# Patient Record
Sex: FEMALE | Race: WHITE | Employment: FULL TIME | URBAN - METROPOLITAN AREA
[De-identification: names, ages, dates, MRNs, and addresses within clinical notes are randomized per-mention and may not be internally consistent; named-entity substitution may affect disease eponyms.]

---

## 2017-03-19 ENCOUNTER — APPOINTMENT (OUTPATIENT)
Dept: CT IMAGING | Age: 52
End: 2017-03-19
Attending: NURSE PRACTITIONER
Payer: COMMERCIAL

## 2017-03-19 ENCOUNTER — HOSPITAL ENCOUNTER (EMERGENCY)
Age: 52
Discharge: HOME | End: 2017-03-19
Attending: EMERGENCY MEDICINE
Payer: COMMERCIAL

## 2017-03-19 ENCOUNTER — APPOINTMENT (OUTPATIENT)
Dept: GENERAL RADIOLOGY | Age: 52
End: 2017-03-19
Attending: PHYSICIAN ASSISTANT
Payer: COMMERCIAL

## 2017-03-19 VITALS
BODY MASS INDEX: 29.73 KG/M2 | RESPIRATION RATE: 18 BRPM | WEIGHT: 185 LBS | HEIGHT: 66 IN | SYSTOLIC BLOOD PRESSURE: 140 MMHG | DIASTOLIC BLOOD PRESSURE: 89 MMHG | TEMPERATURE: 99 F | HEART RATE: 83 BPM | OXYGEN SATURATION: 97 %

## 2017-03-19 DIAGNOSIS — E87.6 HYPOKALEMIA: ICD-10-CM

## 2017-03-19 DIAGNOSIS — B34.9 VIRAL SYNDROME: Primary | ICD-10-CM

## 2017-03-19 LAB
ANION GAP BLD CALC-SCNC: 7 MMOL/L (ref 3–18)
BASOPHILS # BLD AUTO: 0 K/UL (ref 0–0.1)
BASOPHILS # BLD: 0 % (ref 0–2)
BUN SERPL-MCNC: 7 MG/DL (ref 7–18)
BUN/CREAT SERPL: 8 (ref 12–20)
CALCIUM SERPL-MCNC: 8.3 MG/DL (ref 8.5–10.1)
CHLORIDE SERPL-SCNC: 100 MMOL/L (ref 100–108)
CK MB CFR SERPL CALC: 0.8 % (ref 0–4)
CK MB SERPL-MCNC: 1 NG/ML (ref 5–25)
CK SERPL-CCNC: 129 U/L (ref 26–192)
CO2 SERPL-SCNC: 32 MMOL/L (ref 21–32)
CREAT SERPL-MCNC: 0.84 MG/DL (ref 0.6–1.3)
D DIMER PPP FEU-MCNC: 0.6 UG/ML(FEU)
DIFFERENTIAL METHOD BLD: ABNORMAL
EOSINOPHIL # BLD: 0 K/UL (ref 0–0.4)
EOSINOPHIL NFR BLD: 0 % (ref 0–5)
ERYTHROCYTE [DISTWIDTH] IN BLOOD BY AUTOMATED COUNT: 14.8 % (ref 11.6–14.5)
FLUAV AG NPH QL IA: NEGATIVE
FLUBV AG NOSE QL IA: NEGATIVE
GLUCOSE SERPL-MCNC: 97 MG/DL (ref 74–99)
HCT VFR BLD AUTO: 37.2 % (ref 35–45)
HGB BLD-MCNC: 11.9 G/DL (ref 12–16)
LYMPHOCYTES # BLD AUTO: 46 % (ref 21–52)
LYMPHOCYTES # BLD: 1.5 K/UL (ref 0.9–3.6)
MCH RBC QN AUTO: 28 PG (ref 24–34)
MCHC RBC AUTO-ENTMCNC: 32 G/DL (ref 31–37)
MCV RBC AUTO: 87.5 FL (ref 74–97)
MONOCYTES # BLD: 0.5 K/UL (ref 0.05–1.2)
MONOCYTES NFR BLD AUTO: 15 % (ref 3–10)
NEUTS SEG # BLD: 1.3 K/UL (ref 1.8–8)
NEUTS SEG NFR BLD AUTO: 39 % (ref 40–73)
PLATELET # BLD AUTO: 223 K/UL (ref 135–420)
PMV BLD AUTO: 11.1 FL (ref 9.2–11.8)
POTASSIUM SERPL-SCNC: 3.1 MMOL/L (ref 3.5–5.5)
RBC # BLD AUTO: 4.25 M/UL (ref 4.2–5.3)
SODIUM SERPL-SCNC: 139 MMOL/L (ref 136–145)
TROPONIN I BLD-MCNC: <0.04 NG/ML (ref 0–0.08)
TROPONIN I SERPL-MCNC: <0.02 NG/ML (ref 0–0.04)
WBC # BLD AUTO: 3.3 K/UL (ref 4.6–13.2)

## 2017-03-19 PROCEDURE — 99284 EMERGENCY DEPT VISIT MOD MDM: CPT

## 2017-03-19 PROCEDURE — 87804 INFLUENZA ASSAY W/OPTIC: CPT | Performed by: NURSE PRACTITIONER

## 2017-03-19 PROCEDURE — 87081 CULTURE SCREEN ONLY: CPT | Performed by: NURSE PRACTITIONER

## 2017-03-19 PROCEDURE — 85379 FIBRIN DEGRADATION QUANT: CPT | Performed by: PHYSICIAN ASSISTANT

## 2017-03-19 PROCEDURE — 74011250637 HC RX REV CODE- 250/637: Performed by: NURSE PRACTITIONER

## 2017-03-19 PROCEDURE — 74011636320 HC RX REV CODE- 636/320: Performed by: EMERGENCY MEDICINE

## 2017-03-19 PROCEDURE — 71275 CT ANGIOGRAPHY CHEST: CPT

## 2017-03-19 PROCEDURE — 85025 COMPLETE CBC W/AUTO DIFF WBC: CPT | Performed by: PHYSICIAN ASSISTANT

## 2017-03-19 PROCEDURE — 80048 BASIC METABOLIC PNL TOTAL CA: CPT | Performed by: PHYSICIAN ASSISTANT

## 2017-03-19 PROCEDURE — 93005 ELECTROCARDIOGRAM TRACING: CPT

## 2017-03-19 PROCEDURE — 82550 ASSAY OF CK (CPK): CPT | Performed by: PHYSICIAN ASSISTANT

## 2017-03-19 PROCEDURE — 71020 XR CHEST PA LAT: CPT

## 2017-03-19 PROCEDURE — 84484 ASSAY OF TROPONIN QUANT: CPT | Performed by: PHYSICIAN ASSISTANT

## 2017-03-19 RX ORDER — BENZONATATE 100 MG/1
100 CAPSULE ORAL
Qty: 30 CAP | Refills: 0 | Status: SHIPPED | OUTPATIENT
Start: 2017-03-19 | End: 2017-03-26

## 2017-03-19 RX ORDER — POTASSIUM CHLORIDE 20 MEQ/1
40 TABLET, EXTENDED RELEASE ORAL
Status: COMPLETED | OUTPATIENT
Start: 2017-03-19 | End: 2017-03-19

## 2017-03-19 RX ORDER — IBUPROFEN 600 MG/1
600 TABLET ORAL
Qty: 15 TAB | Refills: 0 | Status: SHIPPED | OUTPATIENT
Start: 2017-03-19 | End: 2017-07-25

## 2017-03-19 RX ORDER — HYDROCODONE BITARTRATE AND ACETAMINOPHEN 7.5; 325 MG/1; MG/1
1 TABLET ORAL
Status: COMPLETED | OUTPATIENT
Start: 2017-03-19 | End: 2017-03-19

## 2017-03-19 RX ORDER — LANOLIN ALCOHOL/MO/W.PET/CERES
400 CREAM (GRAM) TOPICAL
Status: COMPLETED | OUTPATIENT
Start: 2017-03-19 | End: 2017-03-19

## 2017-03-19 RX ADMIN — Medication 400 MG: at 22:59

## 2017-03-19 RX ADMIN — POTASSIUM CHLORIDE 40 MEQ: 1500 TABLET, EXTENDED RELEASE ORAL at 22:59

## 2017-03-19 RX ADMIN — IOPAMIDOL 50 ML: 755 INJECTION, SOLUTION INTRAVENOUS at 21:00

## 2017-03-19 RX ADMIN — HYDROCODONE BITARTRATE AND ACETAMINOPHEN 1 TABLET: 7.5; 325 TABLET ORAL at 23:33

## 2017-03-19 NOTE — ED TRIAGE NOTES
Pt, c/o cough, nasal& chest congestion, fever , headache , body aches  Started Thursday was seen at [facility] ,  Started on Augmenttin,    Not getting any bettter, pain in chest  With coughing & breathing

## 2017-03-19 NOTE — ED NOTES
I performed a brief evaluation, including history and physical, of the patient here in triage and I have determined that pt will need further treatment and evaluation from the main side ER physician. I have placed initial orders to help in expediting patients care.      March 19, 2017 at 6:47 PM - Floresita Lechuga PA-C        Visit Vitals    /88 (BP 1 Location: Left arm, BP Patient Position: Sitting)    Pulse 87    Temp 99.8 °F (37.7 °C)    Resp 18    Ht 5' 6\" (1.676 m)    Wt 83.9 kg (185 lb)    SpO2 96%    BMI 29.86 kg/m2

## 2017-03-19 NOTE — LETTER
NOTIFICATION RETURN TO WORK / SCHOOL 
 
3/19/2017 11:09 PM 
 
Ms. Bae [address] [place] [id number] To Whom It May Concern: 
 
Steven Fair is currently under the care of [facility] EMERGENCY DEPT. She will return to work/school on: 3/22/17 If there are questions or concerns please have the patient contact our office. Sincerely, Nevaeh Richardson NP

## 2017-03-20 LAB
ATRIAL RATE: 81 BPM
CALCULATED P AXIS, ECG09: 47 DEGREES
CALCULATED R AXIS, ECG10: 41 DEGREES
CALCULATED T AXIS, ECG11: 59 DEGREES
DIAGNOSIS, 93000: NORMAL
P-R INTERVAL, ECG05: 148 MS
Q-T INTERVAL, ECG07: 394 MS
QRS DURATION, ECG06: 74 MS
QTC CALCULATION (BEZET), ECG08: 457 MS
VENTRICULAR RATE, ECG03: 81 BPM

## 2017-03-20 NOTE — DISCHARGE INSTRUCTIONS
Viral Infections: Care Instructions  Your Care Instructions  You don't feel well, but it's not clear what's causing it. You may have a viral infection. Viruses cause many illnesses, such as the common cold, influenza, fever, rashes, and the diarrhea, nausea, and vomiting that are often called \"stomach flu. \" You may wonder if antibiotic medicines could make you feel better. But antibiotics only treat infections caused by bacteria. They don't work on viruses. The good news is that viral infections usually aren't serious. Most will go away in a few days without medical treatment. In the meantime, there are a few things you can do to make yourself more comfortable. Follow-up care is a key part of your treatment and safety. Be sure to make and go to all appointments, and call your doctor if you are having problems. It's also a good idea to know your test results and keep a list of the medicines you take. How can you care for yourself at home? · Get plenty of rest if you feel tired. · Take an over-the-counter pain medicine if needed, such as acetaminophen (Tylenol), ibuprofen (Advil, Motrin), or naproxen (Aleve). Read and follow all instructions on the label. · Be careful when taking over-the-counter cold or flu medicines and Tylenol at the same time. Many of these medicines have acetaminophen, which is Tylenol. Read the labels to make sure that you are not taking more than the recommended dose. Too much acetaminophen (Tylenol) can be harmful. · Drink plenty of fluids, enough so that your urine is light yellow or clear like water. If you have kidney, heart, or liver disease and have to limit fluids, talk with your doctor before you increase the amount of fluids you drink. · Stay home from work, school, and other public places while you have a fever. When should you call for help? Call 911 anytime you think you may need emergency care. For example, call if:  · You have severe trouble breathing.   · You passed out (lost consciousness). Call your doctor now or seek immediate medical care if:  · You seem to be getting much sicker. · You have a new or higher fever. · You have blood in your stools. · You have new belly pain, or your pain gets worse. · You have a new rash. Watch closely for changes in your health, and be sure to contact your doctor if:  · You start to get better and then get worse. · You do not get better as expected. Where can you learn more? Go to http://arden-emiliano.info/. Enter R317 in the search box to learn more about \"Viral Infections: Care Instructions. \"  Current as of: May 24, 2016  Content Version: 11.1  © 5702-7963 [employer]. Care instructions adapted under license by [employer] (which disclaims liability or warranty for this information). If you have questions about a medical condition or this instruction, always ask your healthcare professional. [place] [address] any warranty or liability for your use of this information. Hypokalemia: Care Instructions  Your Care Instructions  Hypokalemia (say \"fe-ae-ash-DENEEN-claritza-uh\") is a low level of potassium. The heart, muscles, kidneys, and nervous system all need potassium to work well. This problem has many different causes. Kidney problems, diet, and medicines like diuretics and laxatives can cause it. So can vomiting or diarrhea. In some cases, cancer is the cause. Your doctor may do tests to find the cause of your low potassium levels. You may need medicines to bring your potassium levels back to normal. You may also need regular blood tests to check your potassium. If you have very low potassium, you may need intravenous (IV) medicines. You also may need tests to check the electrical activity of your heart. Heart problems caused by low potassium levels can be very serious. Follow-up care is a key part of your treatment and safety.  Be sure to make and go to all appointments, and call your doctor if you are having problems. It's also a good idea to know your test results and keep a list of the medicines you take. How can you care for yourself at home? · If your doctor recommends it, eat foods that have a lot of potassium. These include fresh fruits, juices, and vegetables. They also include nuts, beans, and milk. · Be safe with medicines. If your doctor prescribes medicines or potassium supplements, take them exactly as directed. Call your doctor if you have any problems with your medicines. · Get your potassium levels tested as often as your doctor tells you. When should you call for help? Call 911 anytime you think you may need emergency care. For example, call if:  · You feel like your heart is missing beats. Heart problems caused by low potassium can cause death. · You passed out (lost consciousness). · You have a seizure. Call your doctor now or seek immediate medical care if:  · You feel weak or unusually tired. · You have severe arm or leg cramps. · You have tingling or numbness. · You feel sick to your stomach, or you vomit. · You have belly cramps. · You feel bloated or constipated. · You have to urinate a lot. · You feel very thirsty most of the time. · You are dizzy or lightheaded, or you feel like you may faint. · You feel depressed, or you lose touch with reality. Watch closely for changes in your health, and be sure to contact your doctor if:  · You do not get better as expected. Where can you learn more? Go to http://arden-emiliano.info/. Enter G358 in the search box to learn more about \"Hypokalemia: Care Instructions. \"  Current as of: July 28, 2016  Content Version: 11.1  © 0921-6335 [employer]. Care instructions adapted under license by [employer] (which disclaims liability or warranty for this information).  If you have questions about a medical condition or this instruction, always ask your healthcare professional. [address] any warranty or liability for your use of this information. MyChart Activation    Thank you for requesting access to [facility]. Please follow the instructions below to securely access and download your online medical record. [facility] allows you to send messages to your doctor, view your test results, renew your prescriptions, schedule appointments, and more. How Do I Sign Up? 1. In your internet browser, go to [email or web address]  2. Click on the First Time User? Click Here link in the Sign In box. You will be redirect to the New Member Sign Up page. 3. Enter your [facility] Access Code exactly as it appears below. You will not need to use this code after youve completed the sign-up process. If you do not sign up before the expiration date, you must request a new code. [facility] Access Code: [email or web address]  Expires: 2017  8:58 PM (This is the date your [facility]rio access code will )    4. Enter the last four digits of your Social Security Number (xxxx) and Date of Birth (mm/dd/yyyy) as indicated and click Submit. You will be taken to the next sign-up page. 5. Create a Korrio ID. This will be your [facility]rio login ID and cannot be changed, so think of one that is secure and easy to remember. 6. Create a [facility]rio password. You can change your password at any time. 7. Enter your Password Reset Question and Answer. This can be used at a later time if you forget your password. 8. Enter your e-mail address. You will receive e-mail notification when new information is available in [address]. 9. Click Sign Up. You can now view and download portions of your medical record. 10. Click the Download Summary menu link to download a portable copy of your medical information. Additional Information    If you have questions, please visit the Frequently Asked Questions section of the [facility] website at [email or web address] Remember, MyChart is NOT to be used for urgent needs. For medical emergencies, dial 911.

## 2017-03-21 LAB
B-HEM STREP THROAT QL CULT: NEGATIVE
BACTERIA SPEC CULT: NORMAL
SERVICE CMNT-IMP: NORMAL

## 2017-04-17 RX ORDER — NIFEDIPINE 60 MG/1
TABLET, EXTENDED RELEASE ORAL
Qty: 30 TAB | Refills: 2 | Status: SHIPPED | OUTPATIENT
Start: 2017-04-17 | End: 2017-07-21 | Stop reason: SDUPTHER

## 2017-05-03 DIAGNOSIS — F41.9 ANXIETY: ICD-10-CM

## 2017-05-04 RX ORDER — SERTRALINE HYDROCHLORIDE 25 MG/1
TABLET, FILM COATED ORAL
Qty: 30 TAB | Refills: 3 | Status: SHIPPED | OUTPATIENT
Start: 2017-05-04 | End: 2017-07-21 | Stop reason: SDUPTHER

## 2017-05-12 DIAGNOSIS — Z87.39 H/O ARTHRITIS: ICD-10-CM

## 2017-05-13 RX ORDER — INDOMETHACIN 50 MG/1
CAPSULE ORAL
Qty: 30 CAP | Refills: 1
Start: 2017-05-13

## 2017-07-21 DIAGNOSIS — F41.9 ANXIETY: ICD-10-CM

## 2017-07-21 RX ORDER — SERTRALINE HYDROCHLORIDE 25 MG/1
25 TABLET, FILM COATED ORAL DAILY
Qty: 90 TAB | Refills: 0 | Status: SHIPPED | OUTPATIENT
Start: 2017-07-21 | End: 2017-07-25 | Stop reason: SDUPTHER

## 2017-07-21 RX ORDER — NIFEDIPINE 60 MG/1
60 TABLET, EXTENDED RELEASE ORAL DAILY
Qty: 90 TAB | Refills: 0 | Status: SHIPPED | OUTPATIENT
Start: 2017-07-21 | End: 2017-07-25 | Stop reason: SDUPTHER

## 2017-07-21 NOTE — TELEPHONE ENCOUNTER
This patient contacted office for the following prescriptions to be filled:    Medication requested :   Requested Prescriptions     Pending Prescriptions Disp Refills    sertraline (ZOLOFT) 25 mg tablet 30 Tab 3    NIFEdipine ER (PROCARDIA XL) 60 mg ER tablet 30 Tab 2     PCP: Jude Holm or Print: [facility] stephanie or Local pharmacy [address]     Scheduled appointment if not seen by current providers in office: LOV 11/30/2016 f/u 7/25/2017

## 2017-07-25 ENCOUNTER — OFFICE VISIT (OUTPATIENT)
Dept: FAMILY MEDICINE CLINIC | Age: 52
End: 2017-07-25

## 2017-07-25 VITALS
BODY MASS INDEX: 29.8 KG/M2 | HEART RATE: 81 BPM | HEIGHT: 66 IN | SYSTOLIC BLOOD PRESSURE: 148 MMHG | OXYGEN SATURATION: 97 % | WEIGHT: 185.4 LBS | DIASTOLIC BLOOD PRESSURE: 108 MMHG | TEMPERATURE: 98.3 F | RESPIRATION RATE: 16 BRPM

## 2017-07-25 DIAGNOSIS — K21.9 GASTROESOPHAGEAL REFLUX DISEASE WITHOUT ESOPHAGITIS: ICD-10-CM

## 2017-07-25 DIAGNOSIS — Z12.11 SCREENING FOR COLON CANCER: ICD-10-CM

## 2017-07-25 DIAGNOSIS — K58.9 IRRITABLE BOWEL SYNDROME, UNSPECIFIED TYPE: ICD-10-CM

## 2017-07-25 DIAGNOSIS — G89.29 OTHER CHRONIC PAIN: ICD-10-CM

## 2017-07-25 DIAGNOSIS — E78.5 DYSLIPIDEMIA: ICD-10-CM

## 2017-07-25 DIAGNOSIS — F41.9 ANXIETY: ICD-10-CM

## 2017-07-25 DIAGNOSIS — I10 ESSENTIAL HYPERTENSION: Primary | ICD-10-CM

## 2017-07-25 DIAGNOSIS — R73.01 IMPAIRED FASTING BLOOD SUGAR: ICD-10-CM

## 2017-07-25 RX ORDER — INDOMETHACIN 50 MG/1
50 CAPSULE ORAL
Qty: 60 CAP | Refills: 0 | Status: SHIPPED | OUTPATIENT
Start: 2017-07-25 | End: 2018-02-12 | Stop reason: SDUPTHER

## 2017-07-25 RX ORDER — DICYCLOMINE HYDROCHLORIDE 20 MG/1
20 TABLET ORAL EVERY 6 HOURS
Qty: 30 TAB | Refills: 0 | Status: SHIPPED | OUTPATIENT
Start: 2017-07-25 | End: 2018-02-12 | Stop reason: SDUPTHER

## 2017-07-25 RX ORDER — PHENOL/SODIUM PHENOLATE
20 AEROSOL, SPRAY (ML) MUCOUS MEMBRANE DAILY
Qty: 90 TAB | Refills: 1 | Status: SHIPPED | OUTPATIENT
Start: 2017-07-25 | End: 2018-02-12 | Stop reason: SDUPTHER

## 2017-07-25 RX ORDER — ALPRAZOLAM 0.25 MG/1
0.25 TABLET ORAL
Qty: 10 TAB | Refills: 0 | Status: CANCELLED | OUTPATIENT
Start: 2017-07-25

## 2017-07-25 RX ORDER — NIFEDIPINE 60 MG/1
60 TABLET, EXTENDED RELEASE ORAL DAILY
Qty: 90 TAB | Refills: 0 | Status: SHIPPED | OUTPATIENT
Start: 2017-07-25 | End: 2018-01-18 | Stop reason: SDUPTHER

## 2017-07-25 RX ORDER — INDOMETHACIN 50 MG/1
50 CAPSULE ORAL AS NEEDED
Refills: 0 | COMMUNITY
Start: 2017-05-26 | End: 2017-07-25 | Stop reason: SDUPTHER

## 2017-07-25 RX ORDER — SERTRALINE HYDROCHLORIDE 25 MG/1
25 TABLET, FILM COATED ORAL DAILY
Qty: 90 TAB | Refills: 0 | Status: SHIPPED | OUTPATIENT
Start: 2017-07-25 | End: 2018-01-29 | Stop reason: SDUPTHER

## 2017-07-25 RX ORDER — LISINOPRIL 20 MG/1
20 TABLET ORAL DAILY
Qty: 30 TAB | Refills: 1 | Status: SHIPPED | OUTPATIENT
Start: 2017-07-25 | End: 2018-04-13 | Stop reason: SDUPTHER

## 2017-07-25 NOTE — PATIENT INSTRUCTIONS
DASH Diet: Care Instructions  Your Care Instructions  The DASH diet is an eating plan that can help lower your blood pressure. DASH stands for Dietary Approaches to Stop Hypertension. Hypertension is high blood pressure. The DASH diet focuses on eating foods that are high in calcium, potassium, and magnesium. These nutrients can lower blood pressure. The foods that are highest in these nutrients are fruits, vegetables, low-fat dairy products, nuts, seeds, and legumes. But taking calcium, potassium, and magnesium supplements instead of eating foods that are high in those nutrients does not have the same effect. The DASH diet also includes whole grains, fish, and poultry. The DASH diet is one of several lifestyle changes your doctor may recommend to lower your high blood pressure. Your doctor may also want you to decrease the amount of sodium in your diet. Lowering sodium while following the DASH diet can lower blood pressure even further than just the DASH diet alone. Follow-up care is a key part of your treatment and safety. Be sure to make and go to all appointments, and call your doctor if you are having problems. It's also a good idea to know your test results and keep a list of the medicines you take. How can you care for yourself at home? Following the DASH diet  · Eat 4 to 5 servings of fruit each day. A serving is 1 medium-sized piece of fruit, ½ cup chopped or canned fruit, 1/4 cup dried fruit, or 4 ounces (½ cup) of fruit juice. Choose fruit more often than fruit juice. · Eat 4 to 5 servings of vegetables each day. A serving is 1 cup of lettuce or raw leafy vegetables, ½ cup of chopped or cooked vegetables, or 4 ounces (½ cup) of vegetable juice. Choose vegetables more often than vegetable juice. · Get 2 to 3 servings of low-fat and fat-free dairy each day. A serving is 8 ounces of milk, 1 cup of yogurt, or 1 ½ ounces of cheese. · Eat 6 to 8 servings of grains each day.  A serving is 1 slice of bread, 1 ounce of dry cereal, or ½ cup of cooked rice, pasta, or cooked cereal. Try to choose whole-grain products as much as possible. · Limit lean meat, poultry, and fish to 2 servings each day. A serving is 3 ounces, about the size of a deck of cards. · Eat 4 to 5 servings of nuts, seeds, and legumes (cooked dried beans, lentils, and split peas) each week. A serving is 1/3 cup of nuts, 2 tablespoons of seeds, or ½ cup of cooked beans or peas. · Limit fats and oils to 2 to 3 servings each day. A serving is 1 teaspoon of vegetable oil or 2 tablespoons of salad dressing. · Limit sweets and added sugars to 5 servings or less a week. A serving is 1 tablespoon jelly or jam, ½ cup sorbet, or 1 cup of lemonade. · Eat less than 2,300 milligrams (mg) of sodium a day. If you limit your sodium to 1,500 mg a day, you can lower your blood pressure even more. Tips for success  · Start small. Do not try to make dramatic changes to your diet all at once. You might feel that you are missing out on your favorite foods and then be more likely to not follow the plan. Make small changes, and stick with them. Once those changes become habit, add a few more changes. · Try some of the following:  ¨ Make it a goal to eat a fruit or vegetable at every meal and at snacks. This will make it easy to get the recommended amount of fruits and vegetables each day. ¨ Try yogurt topped with fruit and nuts for a snack or healthy dessert. ¨ Add lettuce, tomato, cucumber, and onion to sandwiches. ¨ Combine a ready-made pizza crust with low-fat mozzarella cheese and lots of vegetable toppings. Try using tomatoes, squash, spinach, broccoli, carrots, cauliflower, and onions. ¨ Have a variety of cut-up vegetables with a low-fat dip as an appetizer instead of chips and dip. ¨ Sprinkle sunflower seeds or chopped almonds over salads. Or try adding chopped walnuts or almonds to cooked vegetables. ¨ Try some vegetarian meals using beans and peas. Add garbanzo or kidney beans to salads. Make burritos and tacos with mashed thorne beans or black beans. Where can you learn more? Go to http://arden-emiliano.info/. Enter P658 in the search box to learn more about \"DASH Diet: Care Instructions. \"  Current as of: April 3, 2017  Content Version: 11.3  © 3694-9883 [employer]. Care instructions adapted under license by [employer] (which disclaims liability or warranty for this information). If you have questions about a medical condition or this instruction, always ask your healthcare professional. [place] [address] any warranty or liability for your use of this information. Anxiety Disorder: Care Instructions  Your Care Instructions  Anxiety is a normal reaction to stress. Difficult situations can cause you to have symptoms such as sweaty palms and a nervous feeling. In an anxiety disorder, the symptoms are far more severe. Constant worry, muscle tension, trouble sleeping, nausea and diarrhea, and other symptoms can make normal daily activities difficult or impossible. These symptoms may occur for no reason, and they can affect your work, school, or social life. Medicines, counseling, and self-care can all help. Follow-up care is a key part of your treatment and safety. Be sure to make and go to all appointments, and call your doctor if you are having problems. It's also a good idea to know your test results and keep a list of the medicines you take. How can you care for yourself at home? · Take medicines exactly as directed. Call your doctor if you think you are having a problem with your medicine. · Go to your counseling sessions and follow-up appointments. · Recognize and accept your anxiety. Then, when you are in a situation that makes you anxious, say to yourself, \"This is not an emergency. I feel uncomfortable, but I am not in danger. I can keep going even if I feel anxious. \"  · Be kind to your body:  ¨ Relieve tension with exercise or a massage. ¨ Get enough rest.  ¨ Avoid alcohol, caffeine, nicotine, and illegal drugs. They can increase your anxiety level and cause sleep problems. ¨ Learn and do relaxation techniques. See below for more about these techniques. · Engage your mind. Get out and do something you enjoy. Go to a funny movie, or take a walk or hike. Plan your day. Having too much or too little to do can make you anxious. · Keep a record of your symptoms. Discuss your fears with a good friend or family member, or join a support group for people with similar problems. Talking to others sometimes relieves stress. · Get involved in social groups, or volunteer to help others. Being alone sometimes makes things seem worse than they are. · Get at least 30 minutes of exercise on most days of the week to relieve stress. Walking is a good choice. You also may want to do other activities, such as running, swimming, cycling, or playing tennis or team sports. Relaxation techniques  Do relaxation exercises 10 to 20 minutes a day. You can play soothing, relaxing music while you do them, if you wish. · Tell others in your house that you are going to do your relaxation exercises. Ask them not to disturb you. · Find a comfortable place, away from all distractions and noise. · Lie down on your back, or sit with your back straight. · Focus on your breathing. Make it slow and steady. · Breathe in through your nose. Breathe out through either your nose or mouth. · Breathe deeply, filling up the area between your navel and your rib cage. Breathe so that your belly goes up and down. · Do not hold your breath. · Breathe like this for 5 to 10 minutes. Notice the feeling of calmness throughout your whole body. As you continue to breathe slowly and deeply, relax by doing the following for another 5 to 10 minutes:  · Tighten and relax each muscle group in your body.  You can begin at your toes and work your way up to your head. · Imagine your muscle groups relaxing and becoming heavy. · Empty your mind of all thoughts. · Let yourself relax more and more deeply. · Become aware of the state of calmness that surrounds you. · When your relaxation time is over, you can bring yourself back to alertness by moving your fingers and toes and then your hands and feet and then stretching and moving your entire body. Sometimes people fall asleep during relaxation, but they usually wake up shortly afterward. · Always give yourself time to return to full alertness before you drive a car or do anything that might cause an accident if you are not fully alert. Never play a relaxation tape while you drive a car. When should you call for help? Call 911 anytime you think you may need emergency care. For example, call if:  · You feel you cannot stop from hurting yourself or someone else. Keep the numbers for these national suicide hotlines: 0-673-843-TALK (3-084-243-137.603.1665) and 4-436-TZYJQBG (9-334.320.9498). If you or someone you know talks about suicide or feeling hopeless, get help right away. Watch closely for changes in your health, and be sure to contact your doctor if:  · You have anxiety or fear that affects your life. · You have symptoms of anxiety that are new or different from those you had before. Where can you learn more? Go to http://arden-emiliano.info/. Enter P754 in the search box to learn more about \"Anxiety Disorder: Care Instructions. \"  Current as of: July 26, 2016  Content Version: 11.3  © 0417-5736 [employer]. Care instructions adapted under license by [employer] (which disclaims liability or warranty for this information). If you have questions about a medical condition or this instruction, always ask your healthcare professional. [address] any warranty or liability for your use of this information.        Gastroesophageal Reflux Disease (GERD): Care Instructions  Your Care Instructions    Gastroesophageal reflux disease (GERD) is the backward flow of stomach acid into the esophagus. The esophagus is the tube that leads from your throat to your stomach. A one-way valve prevents the stomach acid from moving up into this tube. When you have GERD, this valve does not close tightly enough. If you have mild GERD symptoms including heartburn, you may be able to control the problem with antacids or over-the-counter medicine. Changing your diet, losing weight, and making other lifestyle changes can also help reduce symptoms. Follow-up care is a key part of your treatment and safety. Be sure to make and go to all appointments, and call your doctor if you are having problems. Its also a good idea to know your test results and keep a list of the medicines you take. How can you care for yourself at home? · Take your medicines exactly as prescribed. Call your doctor if you think you are having a problem with your medicine. · Your doctor may recommend over-the-counter medicine. For mild or occasional indigestion, antacids, such as Tums, Gaviscon, Mylanta, or Maalox, may help. Your doctor also may recommend over-the-counter acid reducers, such as Pepcid AC, Tagamet HB, Zantac 75, or Prilosec. Read and follow all instructions on the label. If you use these medicines often, talk with your doctor. · Change your eating habits. ¨ Its best to eat several small meals instead of two or three large meals. ¨ After you eat, wait 2 to 3 hours before you lie down. ¨ Chocolate, mint, and alcohol can make GERD worse. ¨ Spicy foods, foods that have a lot of acid (like tomatoes and oranges), and coffee can make GERD symptoms worse in some people. If your symptoms are worse after you eat a certain food, you may want to stop eating that food to see if your symptoms get better. · Do not smoke or chew tobacco. Smoking can make GERD worse.  If you need help quitting, talk to your doctor about stop-smoking programs and medicines. These can increase your chances of quitting for good. · If you have GERD symptoms at night, raise the head of your bed 6 to 8 inches by putting the frame on blocks or placing a foam wedge under the head of your mattress. (Adding extra pillows does not work.)  · Do not wear tight clothing around your middle. · Lose weight if you need to. Losing just 5 to 10 pounds can help. When should you call for help? Call your doctor now or seek immediate medical care if:  · You have new or different belly pain. · Your stools are black and tarlike or have streaks of blood. Watch closely for changes in your health, and be sure to contact your doctor if:  · Your symptoms have not improved after 2 days. · Food seems to catch in your throat or chest.  Where can you learn more? Go to http://arden-meiliano.info/. Enter P241 in the search box to learn more about \"Gastroesophageal Reflux Disease (GERD): Care Instructions. \"  Current as of: August 9, 2016  Content Version: 11.3  © 5154-1324 [employer]. Care instructions adapted under license by [employer] (which disclaims liability or warranty for this information). If you have questions about a medical condition or this instruction, always ask your healthcare professional. [address] any warranty or liability for your use of this information. Diet for Irritable Bowel Syndrome: Care Instructions  Your Care Instructions  Irritable bowel syndrome, or IBS, is a problem with the intestines. IBS can cause belly pain, bloating, gas, constipation, and diarrhea. Most people can control their symptoms by changing their diet and easing stress. No specific foods cause everyone with IBS to have symptoms. Doctors don't offer a specific diet to manage symptoms. But many people find that they feel better when they stop eating certain foods.  A high-fiber diet may help if you have constipation. Follow-up care is a key part of your treatment and safety. Be sure to make and go to all appointments, and call your doctor if you are having problems. It's also a good idea to know your test results and keep a list of the medicines you take. How can you care for yourself at home? To reduce constipation  · Include fruits, vegetables, beans, and whole grains in your diet each day. These foods are high in fiber. Slowly increase the amount of fiber you eat. This helps you avoid a lot of gas. · Drink plenty of fluids, enough so that your urine is light yellow or clear like water. If you have kidney, heart, or liver disease and have to limit fluids, talk with your doctor before you increase the amount of fluids you drink. · Get some exercise every day. Build up slowly to 30 to 60 minutes a day on 5 or more days of the week. · Take a fiber supplement, such as Citrucel or Metamucil, every day if needed. Read and follow all instructions on the label. · Schedule time each day for a bowel movement. Having a daily routine may help. Take your time and do not strain when having a bowel movement. · Check with your doctor before you increase the amount of fiber in your diet. For some people who have IBS, eating more fiber may make some symptoms worse. This includes bloating. To reduce diarrhea  You may try giving up foods or drinks one at a time to see whether symptoms improve.  Limit or avoid the following:  · Alcohol  · Caffeine, which is found in coffee, tea, cola drinks, and chocolate  · Nicotine, from smoking or chewing tobacco  · Gas-producing foods, such as beans, broccoli, cabbage, and apples  · Dairy products that contain lactose (milk sugar), such as ice cream, milk, cheese, and sour cream  · Foods and drinks high in sugar, especially fruit juice, soda, candy, and other packaged sweets (such as cookies)  · Foods high in fat, including retana, sausage, butter, oils, and anything deep-fried  · Sorbitol and xylitol, artificial sweeteners found in some sugarless candies and chewing gum  Keep track of foods  · Some people with IBS use a daily food diary to keep track of what they eat and whether they have any symptoms after eating certain foods. The diary also can be a good way to record what is going on in your life. · Stress plays a role in IBS. So if you are aware that certain stresses bring on symptoms, you can try to reduce those stresses. Keep mealtimes pleasant  · Try to maintain a pleasant environment when you eat. This may reduce stress that can make symptoms likely to occur. · Give yourself plenty of time to eat, rather than eating on the go. Chew your food slowly. Try not to swallow air, which can cause bloating. Where can you learn more? Go to http://arden-emiliano.info/. Enter D883 in the search box to learn more about \"Diet for Irritable Bowel Syndrome: Care Instructions. \"  Current as of: July 26, 2016  Content Version: 11.3  © 5158-5115 [employer]. Care instructions adapted under license by [employer] (which disclaims liability or warranty for this information). If you have questions about a medical condition or this instruction, always ask your healthcare professional. [address] any warranty or liability for your use of this information.

## 2017-07-25 NOTE — PROGRESS NOTES
1. Have you been to the ER, urgent care clinic since your last visit? Hospitalized since your last visit? [facility] 3/19/17    2. Have you seen or consulted any other health care providers outside of the [address] [place] [address] since your last visit? Include any pap smears or colon screening.  Dr. Srini Manley Dentist two months ago    Last pap 7/15/14-  /Dr. Ale Garnica - 10 years ago; normal as per patient

## 2017-07-25 NOTE — PROGRESS NOTES
HISTORY OF PRESENT ILLNESS  Zafar Rodas is a 50 y.o. female. HPI; Here for routine follow up. H/o hypertension. Elevated today. Asymptomatic. compliance with taking medication. No side effects. Repeat blood pressure was also elevated. Will add medication. Denies any headache, dizziness, no chest pain or trouble breathing, no arm or leg weakness. No nausea or vomiting, no weight or appetite changes. No urine or bowel complains, no palpitation, no diaphoresis. Has h/o anxiety. On medication. Does not want any counseling or specialist appt. Need to take xanax on and off for panic attacks but lately stable. Ran out of all tabs and now wants refill. Also on SSRI. No side effects. Shows compliance with medication. No specific concern. Review HM. Due for pap and mammogram. Said she will make an appt with specialist.   Ordered colonoscopy referral.   Review medication list, vitals, problem list,allergies. Review prior labs. Lab Results   Component Value Date/Time    WBC 3.3 03/19/2017 08:10 PM    HGB 11.9 03/19/2017 08:10 PM    HCT 37.2 03/19/2017 08:10 PM    PLATELET 950 37/44/3880 08:10 PM    MCV 87.5 03/19/2017 08:10 PM     Lab Results   Component Value Date/Time    Sodium 139 03/19/2017 08:10 PM    Potassium 3.1 03/19/2017 08:10 PM    Chloride 100 03/19/2017 08:10 PM    CO2 32 03/19/2017 08:10 PM    Anion gap 7 03/19/2017 08:10 PM    Glucose 97 03/19/2017 08:10 PM    BUN 7 03/19/2017 08:10 PM    Creatinine 0.84 03/19/2017 08:10 PM    BUN/Creatinine ratio 8 03/19/2017 08:10 PM    GFR est AA >60 03/19/2017 08:10 PM    GFR est non-AA >60 03/19/2017 08:10 PM    Calcium 8.3 03/19/2017 08:10 PM    Bilirubin, total 0.2 06/27/2015 12:00 AM    AST (SGOT) 12 06/27/2015 12:00 AM    Alk.  phosphatase 76 06/27/2015 12:00 AM    Protein, total 7.3 06/27/2015 12:00 AM    Albumin 4.4 06/27/2015 12:00 AM    Globulin 3.3 02/21/2015 01:05 AM    A-G Ratio 1.5 06/27/2015 12:00 AM    ALT (SGPT) 9 06/27/2015 12:00 AM Lab Results   Component Value Date/Time    TSH 1.07 06/13/2016 11:32 AM     Lab Results   Component Value Date/Time    Cholesterol, total 251 06/13/2016 11:32 AM    HDL Cholesterol 51 06/13/2016 11:32 AM    LDL, calculated 150.2 06/13/2016 11:32 AM    VLDL, calculated 49.8 06/13/2016 11:32 AM    Triglyceride 249 06/13/2016 11:32 AM    CHOL/HDL Ratio 4.9 06/13/2016 11:32 AM     Lab Results   Component Value Date/Time    Hemoglobin A1c 6.2 06/13/2016 11:32 AM     Prediabetes. Not much complaint with diet modification or exercise. Busy with work hours. H/o IBS. More stomach cramps with certain food. Currently no diarrhea or constipation. Taking bentyl as needed. Has prior h/o back surgery and has chronic back pain. Lately need to take medication. Indocin helps. Wants refill on medication. Discussed side effects of medication and advised to take it with food. Will observe. No lower ext tingling ,numbness or weakness. ROS: see HPI     Physical Exam   Constitutional: She is oriented to person, place, and time. No distress. Neck: No thyromegaly present. Cardiovascular: Normal rate, regular rhythm and normal heart sounds. Pulmonary/Chest:   CTA   Abdominal: Soft. Bowel sounds are normal. There is no tenderness. Musculoskeletal: She exhibits no edema. Neurological: She is oriented to person, place, and time. Psychiatric: Her behavior is normal. Thought content normal.       ASSESSMENT and PLAN    ICD-10-CM ICD-9-CM    1. Essential hypertension: elevated. Adding lisinopril to current medication. Low salt diet and f/u next visit. I10 401.9 NIFEdipine ER (PROCARDIA XL) 60 mg ER tablet      METABOLIC PANEL, COMPREHENSIVE      lisinopril (PRINIVIL, ZESTRIL) 20 mg tablet   2. Anxiety; stable. Xanax refill next visit as  not able to checked before pt left. F41.9 300.00 sertraline (ZOLOFT) 25 mg tablet      TSH 3RD GENERATION   3. Gastroesophageal reflux disease without esophagitis: stable.  Given medication refill. K21.9 530.81 dicyclomine (BENTYL) 20 mg tablet      Omeprazole delayed release (PRILOSEC D/R) 20 mg tablet   4. Impaired fasting blood sugar: checking labs. Encourage for diet modification as she is not following at this time. R73.01 790.21 HEMOGLOBIN A1C WITH EAG   5. Dyslipidemia: diet modification. Recheck labs. E78.5 272.4 LIPID PANEL   6. Other chronic pain/ lower back on and off : discussed indocin side effects. See HPI. Take as needed only. Heating pad. Prior h/o back surgery. G89.29 338.29 indomethacin (INDOCIN) 50 mg capsule   7. Irritable bowel syndrome, unspecified type: probiotic and bentyl as needed. K58.9 564.1 TSH 3RD GENERATION      REFERRAL TO GASTROENTEROLOGY   8. Screening for colon cancer Z12.11 V76.51 REFERRAL TO GASTROENTEROLOGY   Pt understood and agrees with above plan. Review HM   Follow-up Disposition:  Return in about 1 month (around 8/25/2017).

## 2017-07-25 NOTE — MR AVS SNAPSHOT
Visit Information Date & Time Provider Department Dept. Phone Encounter #  
 7/25/2017  2:30 PM Benito Alaniz, [address] [phone] [id number] Follow-up Instructions Return in about 1 month (around 8/25/2017). Upcoming Health Maintenance Date Due COLONOSCOPY 4/11/1985 BREAST CANCER SCRN MAMMOGRAM 4/11/2017 PAP AKA CERVICAL CYTOLOGY 7/15/2017 DTaP/Tdap/Td series (2 - Td) 6/15/2026 Allergies as of 7/25/2017  Review Complete On: 7/25/2017 By: Benito Alaniz MD  
  
 Severity Noted Reaction Type Reactions Codeine  02/18/2015    Other (comments) Doxycycline  02/18/2015    Other (comments) Morphine  09/12/2014    Other (comments) Chest burning Current Immunizations  Never Reviewed No immunizations on file. Not reviewed this visit You Were Diagnosed With   
  
 Codes Comments Essential hypertension    -  Primary ICD-10-CM: I10 
ICD-9-CM: 401.9 Anxiety     ICD-10-CM: F41.9 ICD-9-CM: 300.00 Gastroesophageal reflux disease without esophagitis     ICD-10-CM: K21.9 ICD-9-CM: 530.81 Impaired fasting blood sugar     ICD-10-CM: R73.01 
ICD-9-CM: 790.21 Dyslipidemia     ICD-10-CM: E78.5 ICD-9-CM: 272.4 Other chronic pain     ICD-10-CM: G89.29 ICD-9-CM: 338.29 Irritable bowel syndrome, unspecified type     ICD-10-CM: K58.9 ICD-9-CM: 290.1 Screening for colon cancer     ICD-10-CM: Z12.11 ICD-9-CM: V76.51 Vitals BP Pulse Temp Resp Height(growth percentile) Weight(growth percentile) (!) 148/108 (BP 1 Location: Left arm, BP Patient Position: Sitting) 81 98.3 °F (36.8 °C) (Oral) 16 5' 6\" (1.676 m) 185 lb 6.4 oz (84.1 kg) LMP SpO2 BMI OB Status Smoking Status 01/28/2015 97% 29.92 kg/m2 Hysterectomy Never Smoker Vitals History BMI and BSA Data Body Mass Index Body Surface Area  
 29.92 kg/m 2 1.98 m 2 Preferred Pharmacy Pharmacy Name Phone 800 [facility], [address] [place] [address] [phone] Your Updated Medication List  
  
   
This list is accurate as of: 7/25/17  3:26 PM.  Always use your most recent med list.  
  
  
  
  
 ALPRAZolam 0.25 mg tablet Commonly known as:  Laura Demian Take 1 Tab by mouth nightly as needed for Anxiety. Max Daily Amount: 0.25 mg.  
  
 dicyclomine 20 mg tablet Commonly known as:  BENTYL Take 1 Tab by mouth every six (6) hours. indomethacin 50 mg capsule Commonly known as:  INDOCIN Take 1 Cap by mouth two (2) times daily as needed. lisinopril 20 mg tablet Commonly known as:  Tildon Darnell Take 1 Tab by mouth daily. NIFEdipine ER 60 mg ER tablet Commonly known as:  PROCARDIA XL Take 1 Tab by mouth daily. Omeprazole delayed release 20 mg tablet Commonly known as:  PRILOSEC D/R Take 1 Tab by mouth daily. sertraline 25 mg tablet Commonly known as:  ZOLOFT Take 1 Tab by mouth daily. ZyrTEC 10 mg Cap Generic drug:  Cetirizine Take 10 mg by mouth daily. Prescriptions Sent to Pharmacy Refills  
 sertraline (ZOLOFT) 25 mg tablet 0 Sig: Take 1 Tab by mouth daily. Class: Normal  
 Pharmacy: [address] Ph #: [phone] Route: Oral  
 NIFEdipine ER (PROCARDIA XL) 60 mg ER tablet 0 Sig: Take 1 Tab by mouth daily. Class: Normal  
 Pharmacy: [address] Ph #: [phone] Route: Oral  
 dicyclomine (BENTYL) 20 mg tablet 0 Sig: Take 1 Tab by mouth every six (6) hours. Class: Normal  
 Pharmacy: [address] Ph #: [phone] Route: Oral  
 Omeprazole delayed release (PRILOSEC D/R) 20 mg tablet 1 Sig: Take 1 Tab by mouth daily.   
 Class: Normal  
 Pharmacy: [address] [place] [address], [address] [place] [address] ROAD Ph #: [phone] Route: Oral  
 indomethacin (INDOCIN) 50 mg capsule 0 Sig: Take 1 Cap by mouth two (2) times daily as needed. Class: Normal  
 Pharmacy: [facility] [address] [place] [address] Ph #: [phone] Route: Oral  
 lisinopril (PRINIVIL, ZESTRIL) 20 mg tablet 1 Sig: Take 1 Tab by mouth daily. Class: Normal  
 Pharmacy: [facility] [address] [place] [address] Ph #: [phone] Route: Oral  
  
We Performed the Following REFERRAL TO GASTROENTEROLOGY [TLG81 Custom] Comments:  
 Please evaluate patient for screening colonoscopy Follow-up Instructions Return in about 1 month (around 8/25/2017). To-Do List   
 07/25/2017 Lab:  HEMOGLOBIN A1C WITH EAG   
  
 07/25/2017 Lab:  LIPID PANEL   
  
 07/25/2017 Lab:  METABOLIC PANEL, COMPREHENSIVE   
  
 07/25/2017 Lab:  TSH 3RD GENERATION Referral Information Referral ID Referred By Referred To  
  
 [id number] , [address] [place] [address] [address] [place], [address] Phone: [phone] Fax: [phone] Visits Status Start Date End Date 1 New Request 7/25/17 7/25/18 If your referral has a status of pending review or denied, additional information will be sent to support the outcome of this decision. Patient Instructions DASH Diet: Care Instructions Your Care Instructions The DASH diet is an eating plan that can help lower your blood pressure. DASH stands for Dietary Approaches to Stop Hypertension. Hypertension is high blood pressure. The DASH diet focuses on eating foods that are high in calcium, potassium, and magnesium. These nutrients can lower blood pressure. The foods that are highest in these nutrients are fruits, vegetables, low-fat dairy products, nuts, seeds, and legumes.  But taking calcium, potassium, and magnesium supplements instead of eating foods that are high in those nutrients does not have the same effect. The DASH diet also includes whole grains, fish, and poultry. The DASH diet is one of several lifestyle changes your doctor may recommend to lower your high blood pressure. Your doctor may also want you to decrease the amount of sodium in your diet. Lowering sodium while following the DASH diet can lower blood pressure even further than just the DASH diet alone. Follow-up care is a key part of your treatment and safety. Be sure to make and go to all appointments, and call your doctor if you are having problems. It's also a good idea to know your test results and keep a list of the medicines you take. How can you care for yourself at home? Following the DASH diet · Eat 4 to 5 servings of fruit each day. A serving is 1 medium-sized piece of fruit, ½ cup chopped or canned fruit, 1/4 cup dried fruit, or 4 ounces (½ cup) of fruit juice. Choose fruit more often than fruit juice. · Eat 4 to 5 servings of vegetables each day. A serving is 1 cup of lettuce or raw leafy vegetables, ½ cup of chopped or cooked vegetables, or 4 ounces (½ cup) of vegetable juice. Choose vegetables more often than vegetable juice. · Get 2 to 3 servings of low-fat and fat-free dairy each day. A serving is 8 ounces of milk, 1 cup of yogurt, or 1 ½ ounces of cheese. · Eat 6 to 8 servings of grains each day. A serving is 1 slice of bread, 1 ounce of dry cereal, or ½ cup of cooked rice, pasta, or cooked cereal. Try to choose whole-grain products as much as possible. · Limit lean meat, poultry, and fish to 2 servings each day. A serving is 3 ounces, about the size of a deck of cards. · Eat 4 to 5 servings of nuts, seeds, and legumes (cooked dried beans, lentils, and split peas) each week. A serving is 1/3 cup of nuts, 2 tablespoons of seeds, or ½ cup of cooked beans or peas. · Limit fats and oils to 2 to 3 servings each day. A serving is 1 teaspoon of vegetable oil or 2 tablespoons of salad dressing. · Limit sweets and added sugars to 5 servings or less a week. A serving is 1 tablespoon jelly or jam, ½ cup sorbet, or 1 cup of lemonade. · Eat less than 2,300 milligrams (mg) of sodium a day. If you limit your sodium to 1,500 mg a day, you can lower your blood pressure even more. Tips for success · Start small. Do not try to make dramatic changes to your diet all at once. You might feel that you are missing out on your favorite foods and then be more likely to not follow the plan. Make small changes, and stick with them. Once those changes become habit, add a few more changes. · Try some of the following: ¨ Make it a goal to eat a fruit or vegetable at every meal and at snacks. This will make it easy to get the recommended amount of fruits and vegetables each day. ¨ Try yogurt topped with fruit and nuts for a snack or healthy dessert. ¨ Add lettuce, tomato, cucumber, and onion to sandwiches. ¨ Combine a ready-made pizza crust with low-fat mozzarella cheese and lots of vegetable toppings. Try using tomatoes, squash, spinach, broccoli, carrots, cauliflower, and onions. ¨ Have a variety of cut-up vegetables with a low-fat dip as an appetizer instead of chips and dip. ¨ Sprinkle sunflower seeds or chopped almonds over salads. Or try adding chopped walnuts or almonds to cooked vegetables. ¨ Try some vegetarian meals using beans and peas. Add garbanzo or kidney beans to salads. Make burritos and tacos with mashed thorne beans or black beans. Where can you learn more? Go to http://arden-emiliano.info/. Enter H247 in the search box to learn more about \"DASH Diet: Care Instructions. \" Current as of: April 3, 2017 Content Version: 11.3 © 4524-0459 [employer], [employer].  Care instructions adapted under license by [address] (which disclaims liability or warranty for this information). If you have questions about a medical condition or this instruction, always ask your healthcare professional. [address] any warranty or liability for your use of this information. Anxiety Disorder: Care Instructions Your Care Instructions Anxiety is a normal reaction to stress. Difficult situations can cause you to have symptoms such as sweaty palms and a nervous feeling. In an anxiety disorder, the symptoms are far more severe. Constant worry, muscle tension, trouble sleeping, nausea and diarrhea, and other symptoms can make normal daily activities difficult or impossible. These symptoms may occur for no reason, and they can affect your work, school, or social life. Medicines, counseling, and self-care can all help. Follow-up care is a key part of your treatment and safety. Be sure to make and go to all appointments, and call your doctor if you are having problems. It's also a good idea to know your test results and keep a list of the medicines you take. How can you care for yourself at home? · Take medicines exactly as directed. Call your doctor if you think you are having a problem with your medicine. · Go to your counseling sessions and follow-up appointments. · Recognize and accept your anxiety. Then, when you are in a situation that makes you anxious, say to yourself, \"This is not an emergency. I feel uncomfortable, but I am not in danger. I can keep going even if I feel anxious. \" · Be kind to your body: ¨ Relieve tension with exercise or a massage. ¨ Get enough rest. 
¨ Avoid alcohol, caffeine, nicotine, and illegal drugs. They can increase your anxiety level and cause sleep problems. ¨ Learn and do relaxation techniques. See below for more about these techniques. · Engage your mind. Get out and do something you enjoy.  Go to a funny movie, or take a walk or hike. Plan your day. Having too much or too little to do can make you anxious. · Keep a record of your symptoms. Discuss your fears with a good friend or family member, or join a support group for people with similar problems. Talking to others sometimes relieves stress. · Get involved in social groups, or volunteer to help others. Being alone sometimes makes things seem worse than they are. · Get at least 30 minutes of exercise on most days of the week to relieve stress. Walking is a good choice. You also may want to do other activities, such as running, swimming, cycling, or playing tennis or team sports. Relaxation techniques Do relaxation exercises 10 to 20 minutes a day. You can play soothing, relaxing music while you do them, if you wish. · Tell others in your house that you are going to do your relaxation exercises. Ask them not to disturb you. · Find a comfortable place, away from all distractions and noise. · Lie down on your back, or sit with your back straight. · Focus on your breathing. Make it slow and steady. · Breathe in through your nose. Breathe out through either your nose or mouth. · Breathe deeply, filling up the area between your navel and your rib cage. Breathe so that your belly goes up and down. · Do not hold your breath. · Breathe like this for 5 to 10 minutes. Notice the feeling of calmness throughout your whole body. As you continue to breathe slowly and deeply, relax by doing the following for another 5 to 10 minutes: · Tighten and relax each muscle group in your body. You can begin at your toes and work your way up to your head. · Imagine your muscle groups relaxing and becoming heavy. · Empty your mind of all thoughts. · Let yourself relax more and more deeply. · Become aware of the state of calmness that surrounds you.  
· When your relaxation time is over, you can bring yourself back to alertness by moving your fingers and toes and then your hands and feet and then stretching and moving your entire body. Sometimes people fall asleep during relaxation, but they usually wake up shortly afterward. · Always give yourself time to return to full alertness before you drive a car or do anything that might cause an accident if you are not fully alert. Never play a relaxation tape while you drive a car. When should you call for help? Call 911 anytime you think you may need emergency care. For example, call if: 
· You feel you cannot stop from hurting yourself or someone else. Keep the numbers for these national suicide hotlines: 9-535-460-TALK (9-601-264-138-840-0645) and 8-184-UZSVEMQ (9-595.301.7153). If you or someone you know talks about suicide or feeling hopeless, get help right away. Watch closely for changes in your health, and be sure to contact your doctor if: 
· You have anxiety or fear that affects your life. · You have symptoms of anxiety that are new or different from those you had before. Where can you learn more? Go to http://ardenChromatikemiliano.info/. Enter P754 in the search box to learn more about \"Anxiety Disorder: Care Instructions. \" Current as of: July 26, 2016 Content Version: 11.3 © 3931-7850 Transerv. Care instructions adapted under license by [employer] (which disclaims liability or warranty for this information). If you have questions about a medical condition or this instruction, always ask your healthcare professional. [place] [address] any warranty or liability for your use of this information. Gastroesophageal Reflux Disease (GERD): Care Instructions Your Care Instructions Gastroesophageal reflux disease (GERD) is the backward flow of stomach acid into the esophagus. The esophagus is the tube that leads from your throat to your stomach.  A one-way valve prevents the stomach acid from moving up into this tube. When you have GERD, this valve does not close tightly enough. If you have mild GERD symptoms including heartburn, you may be able to control the problem with antacids or over-the-counter medicine. Changing your diet, losing weight, and making other lifestyle changes can also help reduce symptoms. Follow-up care is a key part of your treatment and safety. Be sure to make and go to all appointments, and call your doctor if you are having problems. Its also a good idea to know your test results and keep a list of the medicines you take. How can you care for yourself at home? · Take your medicines exactly as prescribed. Call your doctor if you think you are having a problem with your medicine. · Your doctor may recommend over-the-counter medicine. For mild or occasional indigestion, antacids, such as Tums, Gaviscon, Mylanta, or Maalox, may help. Your doctor also may recommend over-the-counter acid reducers, such as Pepcid AC, Tagamet HB, Zantac 75, or Prilosec. Read and follow all instructions on the label. If you use these medicines often, talk with your doctor. · Change your eating habits. ¨ Its best to eat several small meals instead of two or three large meals. ¨ After you eat, wait 2 to 3 hours before you lie down. ¨ Chocolate, mint, and alcohol can make GERD worse. ¨ Spicy foods, foods that have a lot of acid (like tomatoes and oranges), and coffee can make GERD symptoms worse in some people. If your symptoms are worse after you eat a certain food, you may want to stop eating that food to see if your symptoms get better. · Do not smoke or chew tobacco. Smoking can make GERD worse. If you need help quitting, talk to your doctor about stop-smoking programs and medicines. These can increase your chances of quitting for good.  
· If you have GERD symptoms at night, raise the head of your bed 6 to 8 inches by putting the frame on blocks or placing a foam wedge under the head of your mattress. (Adding extra pillows does not work.) · Do not wear tight clothing around your middle. · Lose weight if you need to. Losing just 5 to 10 pounds can help. When should you call for help? Call your doctor now or seek immediate medical care if: 
· You have new or different belly pain. · Your stools are black and tarlike or have streaks of blood. Watch closely for changes in your health, and be sure to contact your doctor if: 
· Your symptoms have not improved after 2 days. · Food seems to catch in your throat or chest. 
Where can you learn more? Go to http://arden-emiliano.info/. Enter F735 in the search box to learn more about \"Gastroesophageal Reflux Disease (GERD): Care Instructions. \" Current as of: August 9, 2016 Content Version: 11.3 © 9941-9734 Mainstream Renewable Power. Care instructions adapted under license by [employer] (which disclaims liability or warranty for this information). If you have questions about a medical condition or this instruction, always ask your healthcare professional. [place] [address] any warranty or liability for your use of this information. Diet for Irritable Bowel Syndrome: Care Instructions Your Care Instructions Irritable bowel syndrome, or IBS, is a problem with the intestines. IBS can cause belly pain, bloating, gas, constipation, and diarrhea. Most people can control their symptoms by changing their diet and easing stress. No specific foods cause everyone with IBS to have symptoms. Doctors don't offer a specific diet to manage symptoms. But many people find that they feel better when they stop eating certain foods. A high-fiber diet may help if you have constipation. Follow-up care is a key part of your treatment and safety. Be sure to make and go to all appointments, and call your doctor if you are having problems.  It's also a good idea to know your test results and keep a list of the medicines you take. How can you care for yourself at home? To reduce constipation · Include fruits, vegetables, beans, and whole grains in your diet each day. These foods are high in fiber. Slowly increase the amount of fiber you eat. This helps you avoid a lot of gas. · Drink plenty of fluids, enough so that your urine is light yellow or clear like water. If you have kidney, heart, or liver disease and have to limit fluids, talk with your doctor before you increase the amount of fluids you drink. · Get some exercise every day. Build up slowly to 30 to 60 minutes a day on 5 or more days of the week. · Take a fiber supplement, such as Citrucel or Metamucil, every day if needed. Read and follow all instructions on the label. · Schedule time each day for a bowel movement. Having a daily routine may help. Take your time and do not strain when having a bowel movement. · Check with your doctor before you increase the amount of fiber in your diet. For some people who have IBS, eating more fiber may make some symptoms worse. This includes bloating. To reduce diarrhea You may try giving up foods or drinks one at a time to see whether symptoms improve. Limit or avoid the following: · Alcohol · Caffeine, which is found in coffee, tea, cola drinks, and chocolate · Nicotine, from smoking or chewing tobacco 
· Gas-producing foods, such as beans, broccoli, cabbage, and apples · Dairy products that contain lactose (milk sugar), such as ice cream, milk, cheese, and sour cream 
· Foods and drinks high in sugar, especially fruit juice, soda, candy, and other packaged sweets (such as cookies) · Foods high in fat, including retana, sausage, butter, oils, and anything deep-fried · Sorbitol and xylitol, artificial sweeteners found in some sugarless candies and chewing gum Keep track of foods · Some people with IBS use a daily food diary to keep track of what they eat and whether they have any symptoms after eating certain foods. The diary also can be a good way to record what is going on in your life. · Stress plays a role in IBS. So if you are aware that certain stresses bring on symptoms, you can try to reduce those stresses. Keep mealtimes pleasant · Try to maintain a pleasant environment when you eat. This may reduce stress that can make symptoms likely to occur. · Give yourself plenty of time to eat, rather than eating on the go. Chew your food slowly. Try not to swallow air, which can cause bloating. Where can you learn more? Go to http://arden-emiliano.info/. Enter H205 in the search box to learn more about \"Diet for Irritable Bowel Syndrome: Care Instructions. \" Current as of: July 26, 2016 Content Version: 11.3 © 8352-5883 Bootstrap Software, [employer]. Care instructions adapted under license by [employer] (which disclaims liability or warranty for this information). If you have questions about a medical condition or this instruction, always ask your healthcare professional. [place] [address] any warranty or liability for your use of this information. Introducing [facility]! [employer] introduces E-Line Media patient portal. Now you can access parts of your medical record, email your doctor's office, and request medication refills online. 1. In your internet browser, go to [email or web address] 2. Click on the First Time User? Click Here link in the Sign In box. You will see the New Member Sign Up page. 3. Enter your E-Line Media Access Code exactly as it appears below. You will not need to use this code after youve completed the sign-up process. If you do not sign up before the expiration date, you must request a new code. · E-Line [employer] Access Code: [email or web address] Expires: 10/23/2017  3:06 PM 
 
4.  Enter the last four digits of your Social Security Number (xxxx) and Date of Birth (mm/dd/yyyy) as indicated and click Submit. You will be taken to the next sign-up page. 5. Create a GeoVS ID. This will be your GeoVS login ID and cannot be changed, so think of one that is secure and easy to remember. 6. Create a GeoVS password. You can change your password at any time. 7. Enter your Password Reset Question and Answer. This can be used at a later time if you forget your password. 8. Enter your e-mail address. You will receive e-mail notification when new information is available in [address] 9. Click Sign Up. You can now view and download portions of your medical record. 10. Click the Download Summary menu link to download a portable copy of your medical information. If you have questions, please visit the Frequently Asked Questions section of the GeoVS website. Remember, GeoVS is NOT to be used for urgent needs. For medical emergencies, dial 911. Now available from your iPhone and Android! Please provide this summary of care documentation to your next provider. Your primary care clinician is listed as Mello Degroot. If you have any questions after today's visit, please call [phone].

## 2017-12-05 ENCOUNTER — HOSPITAL ENCOUNTER (EMERGENCY)
Age: 52
Discharge: HOME | End: 2017-12-05
Attending: EMERGENCY MEDICINE
Payer: COMMERCIAL

## 2017-12-05 VITALS
OXYGEN SATURATION: 96 % | WEIGHT: 190 LBS | HEIGHT: 65 IN | BODY MASS INDEX: 31.65 KG/M2 | TEMPERATURE: 98.7 F | HEART RATE: 87 BPM | DIASTOLIC BLOOD PRESSURE: 93 MMHG | RESPIRATION RATE: 16 BRPM | SYSTOLIC BLOOD PRESSURE: 148 MMHG

## 2017-12-05 DIAGNOSIS — M79.604 MUSCULOSKELETAL LEG PAIN, RIGHT: Primary | ICD-10-CM

## 2017-12-05 LAB — D DIMER PPP FEU-MCNC: 0.3 UG/ML(FEU)

## 2017-12-05 PROCEDURE — 74011250637 HC RX REV CODE- 250/637: Performed by: EMERGENCY MEDICINE

## 2017-12-05 PROCEDURE — 99283 EMERGENCY DEPT VISIT LOW MDM: CPT

## 2017-12-05 PROCEDURE — 85379 FIBRIN DEGRADATION QUANT: CPT | Performed by: EMERGENCY MEDICINE

## 2017-12-05 RX ORDER — TRAMADOL HYDROCHLORIDE 50 MG/1
100 TABLET ORAL
Status: COMPLETED | OUTPATIENT
Start: 2017-12-05 | End: 2017-12-05

## 2017-12-05 RX ORDER — METHOCARBAMOL 750 MG/1
750 TABLET, FILM COATED ORAL
Qty: 12 TAB | Refills: 0 | Status: SHIPPED | OUTPATIENT
Start: 2017-12-05 | End: 2018-02-12

## 2017-12-05 RX ORDER — TRAMADOL HYDROCHLORIDE 50 MG/1
50 TABLET ORAL
Qty: 12 TAB | Refills: 0 | Status: SHIPPED | OUTPATIENT
Start: 2017-12-05 | End: 2018-02-12

## 2017-12-05 RX ADMIN — TRAMADOL HYDROCHLORIDE 100 MG: 50 TABLET, FILM COATED ORAL at 20:39

## 2017-12-06 NOTE — ED PROVIDER NOTES
EMERGENCY DEPARTMENT HISTORY AND PHYSICAL EXAM    7:46 PM      Date: 12/5/2017  Patient Name: Madina Deluca    History of Presenting Illness     No chief complaint on file. History Provided By: Patient    Chief Complaint: Leg pain  Duration:  Days  Timing:  Worsening  Location: Right  Quality: Pulling  Severity: 7 out of 10  Modifying Factors: Pain is exacerbated with movement. Associated Symptoms: none      Additional History (Context): Madina Deluca is a 50 y.o. female with hypertension and GERD who presents with worsening pulling right leg pain with a pain severity of a 7/10 onset 3 days ago. Denies recent travel, no hormonal therapy, CP, dizziness, numbness or SOB. States the pain is exacerbated with movement. She is not certain of any family hx of blood clots. Pt has no shx of tobacco or alcohol use. Pt states motrin is not helping. No further complaints at this time. PCP: Yohan Arroyo MD        Past History     Past Medical History:  Past Medical History:   Diagnosis Date    GERD (gastroesophageal reflux disease)     Glaucoma     Hypertension     Nausea & vomiting     nausea       Past Surgical History:  Past Surgical History:   Procedure Laterality Date    HX CHOLECYSTECTOMY      HX DILATION AND CURETTAGE  8/14    hysterectomy    HX HEENT      glaucoma    HX ORTHOPAEDIC      back       Family History:  No family history on file. Social History:  Social History   Substance Use Topics    Smoking status: Never Smoker    Smokeless tobacco: Never Used    Alcohol use No       Allergies: Allergies   Allergen Reactions    Codeine Other (comments)    Doxycycline Other (comments)    Morphine Other (comments)     Chest burning         Review of Systems       Review of Systems   Constitutional: Negative for fever. Respiratory: Negative for shortness of breath. Cardiovascular: Negative for chest pain. Gastrointestinal: Negative for nausea.    Musculoskeletal: Positive for arthralgias and myalgias. Negative for neck pain. Positive for leg pain   Skin: Negative for wound. Neurological: Negative for dizziness. All other systems reviewed and are negative. Physical Exam     Visit Vitals    BP (!) 148/93 (BP 1 Location: Left arm, BP Patient Position: At rest;Sitting)    Pulse 87    Temp 98.7 °F (37.1 °C)    Resp 16    Ht 5' 5\" (1.651 m)    Wt 86.2 kg (190 lb)    LMP 01/28/2015    SpO2 96%    BMI 31.62 kg/m2         Physical Exam   Constitutional: She is oriented to person, place, and time. She appears well-developed and well-nourished. No distress. HENT:   Head: Normocephalic and atraumatic. Mouth/Throat: Oropharynx is clear and moist.   Eyes: Conjunctivae are normal. Pupils are equal, round, and reactive to light. No scleral icterus. Neck: Normal range of motion. Neck supple. Cardiovascular: Intact distal pulses. Capillary refill < 3 seconds   Pulmonary/Chest: Effort normal and breath sounds normal. No respiratory distress. She has no wheezes. Musculoskeletal:   Popliteal TTP. No swelling. No calf tenderness. Does have medial distal thigh tenderness on right. No knee tenderness. Neurological: She is alert and oriented to person, place, and time. No cranial nerve deficit. She exhibits normal muscle tone. Coordination normal.   Sensation intact  Strength 5/5  Good dorsiflexion and plantarflexion right foot. Skin: Skin is warm and dry. She is not diaphoretic. Psychiatric: Thought content normal.   Nursing note and vitals reviewed. Diagnostic Study Results     Labs -  Recent Results (from the past 12 hour(s))   D DIMER    Collection Time: 12/05/17  7:48 PM   Result Value Ref Range    D DIMER 0.30 <0.46 ug/ml(FEU)       Radiologic Studies -   No orders to display         Medical Decision Making   I am the first provider for this patient.     I reviewed the vital signs, available nursing notes, past medical history, past surgical history, family history and social history. Vital Signs-Reviewed the patient's vital signs. Provider Notes (Medical Decision Making):  MDM  Number of Diagnoses or Management Options  Musculoskeletal leg pain, right:   Diagnosis management comments: DDX: Dvt, Bakers cyst, Muscle strain Muscle cramp, sciatica  She has no knee tenderness. No trauma    Pt with hx of chronic back pain  Will get d dimer. Gave pain medication. Pt uncertain of fhx of dvt and has no other risk factors for thrombosis. If d-dimer neg discharge and follow up with pcp. If postitive, give her a dose of lovanox and have her return for dvt study. Pt states she is not allergic to tramadol, has taken it in the past.        Amount and/or Complexity of Data Reviewed  Clinical lab tests: ordered and reviewed  Tests in the medicine section of CPT®: ordered and reviewed    Patient Progress  Patient progress: stable    I have reassessed the patient. I have discussed the workup, results and plan with the patient and patient is in agreement. Patient has no new complaint. Patient will be prescribed robaxin, tramadol. Patient was discharge in stable condition. Patient was given outpatient follow up. Patient is to return to emergency department if any new or worsening condition. Medications   traMADol (ULTRAM) tablet 100 mg (100 mg Oral Given 12/5/17 2039)       Diagnosis     Clinical Impression:   1. Musculoskeletal leg pain, right        Disposition: discharged    Follow-up Information     Follow up With Details Comments Contact Info    Claire Guerra MD Go in 2 days For Follow up [facility] [place].      [facility] EMERGENCY DEPT Go to As needed, If symptoms worsen [address] [place] [id number]  [phone]           Discharge Medication List as of 12/5/2017  8:38 PM      START taking these medications    Details   methocarbamol (ROBAXIN-750) 750 mg tablet Take 1 Tab by mouth three (3) times daily as needed. , Print, Disp-12 Tab, R-0      traMADol (ULTRAM) 50 mg tablet Take 1 Tab by mouth every six (6) hours as needed for Pain. Max Daily Amount: 200 mg. Indications: Pain, Print, Disp-12 Tab, R-0         CONTINUE these medications which have NOT CHANGED    Details   sertraline (ZOLOFT) 25 mg tablet Take 1 Tab by mouth daily. , Normal, Disp-90 Tab, R-0      NIFEdipine ER (PROCARDIA XL) 60 mg ER tablet Take 1 Tab by mouth daily. , Normal, Disp-90 Tab, R-0      dicyclomine (BENTYL) 20 mg tablet Take 1 Tab by mouth every six (6) hours. , Normal, Disp-30 Tab, R-0      Omeprazole delayed release (PRILOSEC D/R) 20 mg tablet Take 1 Tab by mouth daily. , Normal, Disp-90 Tab, R-1      indomethacin (INDOCIN) 50 mg capsule Take 1 Cap by mouth two (2) times daily as needed., Normal, Disp-60 Cap, R-0      lisinopril (PRINIVIL, ZESTRIL) 20 mg tablet Take 1 Tab by mouth daily. , Normal, Disp-30 Tab, R-1      ALPRAZolam (XANAX) 0.25 mg tablet Take 1 Tab by mouth nightly as needed for Anxiety. Max Daily Amount: 0.25 mg., Print, Disp-10 Tab, R-0      Cetirizine (ZYRTEC) 10 mg cap Take 10 mg by mouth daily. , Historical Med           _______________________________    Attestations:  [employer] [address]  [address] acting as a scribe for and in the presence of Lillian Mejia DO      December 05, 2017 at 7:46 PM       Provider Attestation:      I personally performed the services described in the documentation, reviewed the documentation, as recorded by the scribe in my presence, and it accurately and completely records my words and actions.  December 05, 2017 at 7:46 PM - Lillian Mejia DO    _______________________________

## 2018-01-29 DIAGNOSIS — F41.9 ANXIETY: ICD-10-CM

## 2018-01-29 RX ORDER — SERTRALINE HYDROCHLORIDE 25 MG/1
TABLET, FILM COATED ORAL
Qty: 90 TAB | Refills: 0 | Status: SHIPPED | OUTPATIENT
Start: 2018-01-29 | End: 2018-01-29 | Stop reason: SDUPTHER

## 2018-01-29 RX ORDER — SERTRALINE HYDROCHLORIDE 25 MG/1
25 TABLET, FILM COATED ORAL DAILY
Qty: 90 TAB | Refills: 0 | Status: SHIPPED | OUTPATIENT
Start: 2018-01-29 | End: 2018-02-12 | Stop reason: SDUPTHER

## 2018-01-29 NOTE — TELEPHONE ENCOUNTER
This pharmacy faxed over request for the following prescriptions to be filled:    Medication requested :   Requested Prescriptions     Pending Prescriptions Disp Refills    sertraline (ZOLOFT) 25 mg tablet [Pharmacy Med Name: SERTRALINE HCL 25 MG TABLET] 90 Tab 0     Sig: take 1 tablet by mouth once daily     PCP: Jude Holm or Print:  [employer] order or [facility]     Scheduled appointment if not seen by current providers in office: LOV 7/25/2017 f/u 2/5/2018

## 2018-02-09 ENCOUNTER — HOSPITAL ENCOUNTER (OUTPATIENT)
Dept: LAB | Age: 53
Discharge: HOME | End: 2018-02-09
Payer: COMMERCIAL

## 2018-02-09 DIAGNOSIS — I10 ESSENTIAL HYPERTENSION: ICD-10-CM

## 2018-02-09 DIAGNOSIS — E78.5 DYSLIPIDEMIA: ICD-10-CM

## 2018-02-09 DIAGNOSIS — K58.9 IRRITABLE BOWEL SYNDROME, UNSPECIFIED TYPE: ICD-10-CM

## 2018-02-09 DIAGNOSIS — R73.01 IMPAIRED FASTING BLOOD SUGAR: ICD-10-CM

## 2018-02-09 DIAGNOSIS — F41.9 ANXIETY: ICD-10-CM

## 2018-02-09 LAB
ALBUMIN SERPL-MCNC: 4 G/DL (ref 3.4–5)
ALBUMIN/GLOB SERPL: 0.9 {RATIO} (ref 0.8–1.7)
ALP SERPL-CCNC: 123 U/L (ref 45–117)
ALT SERPL-CCNC: 18 U/L (ref 13–56)
ANION GAP SERPL CALC-SCNC: 5 MMOL/L (ref 3–18)
AST SERPL-CCNC: 11 U/L (ref 15–37)
BILIRUB SERPL-MCNC: 0.2 MG/DL (ref 0.2–1)
BUN SERPL-MCNC: 11 MG/DL (ref 7–18)
BUN/CREAT SERPL: 16 (ref 12–20)
CALCIUM SERPL-MCNC: 9.5 MG/DL (ref 8.5–10.1)
CHLORIDE SERPL-SCNC: 104 MMOL/L (ref 100–108)
CHOLEST SERPL-MCNC: 297 MG/DL
CO2 SERPL-SCNC: 33 MMOL/L (ref 21–32)
CREAT SERPL-MCNC: 0.69 MG/DL (ref 0.6–1.3)
EST. AVERAGE GLUCOSE BLD GHB EST-MCNC: 128 MG/DL
GLOBULIN SER CALC-MCNC: 4.4 G/DL (ref 2–4)
GLUCOSE SERPL-MCNC: 115 MG/DL (ref 74–99)
HBA1C MFR BLD: 6.1 % (ref 4.2–5.6)
HDLC SERPL-MCNC: 53 MG/DL (ref 40–60)
HDLC SERPL: 5.6 {RATIO} (ref 0–5)
LDLC SERPL CALC-MCNC: 190 MG/DL (ref 0–100)
LIPID PROFILE,FLP: ABNORMAL
POTASSIUM SERPL-SCNC: 4.8 MMOL/L (ref 3.5–5.5)
PROT SERPL-MCNC: 8.4 G/DL (ref 6.4–8.2)
SODIUM SERPL-SCNC: 142 MMOL/L (ref 136–145)
TRIGL SERPL-MCNC: 270 MG/DL (ref ?–150)
TSH SERPL DL<=0.05 MIU/L-ACNC: 1.42 UIU/ML (ref 0.36–3.74)
VLDLC SERPL CALC-MCNC: 54 MG/DL

## 2018-02-09 PROCEDURE — 83036 HEMOGLOBIN GLYCOSYLATED A1C: CPT | Performed by: FAMILY MEDICINE

## 2018-02-09 PROCEDURE — 84443 ASSAY THYROID STIM HORMONE: CPT | Performed by: FAMILY MEDICINE

## 2018-02-09 PROCEDURE — 80053 COMPREHEN METABOLIC PANEL: CPT | Performed by: FAMILY MEDICINE

## 2018-02-09 PROCEDURE — 36415 COLL VENOUS BLD VENIPUNCTURE: CPT | Performed by: FAMILY MEDICINE

## 2018-02-09 PROCEDURE — 80061 LIPID PANEL: CPT | Performed by: FAMILY MEDICINE

## 2018-02-09 NOTE — PROGRESS NOTES
Let pt know that has elevated lipid panel. Elevated protein. . Need to come in to discuss it further. Thanks.

## 2018-02-12 ENCOUNTER — OFFICE VISIT (OUTPATIENT)
Dept: FAMILY MEDICINE CLINIC | Age: 53
End: 2018-02-12

## 2018-02-12 VITALS
TEMPERATURE: 97.9 F | OXYGEN SATURATION: 98 % | BODY MASS INDEX: 31.99 KG/M2 | HEART RATE: 77 BPM | WEIGHT: 192 LBS | SYSTOLIC BLOOD PRESSURE: 152 MMHG | HEIGHT: 65 IN | RESPIRATION RATE: 16 BRPM | DIASTOLIC BLOOD PRESSURE: 100 MMHG

## 2018-02-12 DIAGNOSIS — R73.01 IMPAIRED FASTING BLOOD SUGAR: ICD-10-CM

## 2018-02-12 DIAGNOSIS — F41.9 ANXIETY: Primary | ICD-10-CM

## 2018-02-12 DIAGNOSIS — I10 ESSENTIAL HYPERTENSION: ICD-10-CM

## 2018-02-12 DIAGNOSIS — Z12.11 SCREENING FOR COLON CANCER: ICD-10-CM

## 2018-02-12 DIAGNOSIS — K21.9 GASTROESOPHAGEAL REFLUX DISEASE WITHOUT ESOPHAGITIS: ICD-10-CM

## 2018-02-12 DIAGNOSIS — G89.29 OTHER CHRONIC PAIN: ICD-10-CM

## 2018-02-12 DIAGNOSIS — M51.37 DEGENERATIVE DISC DISEASE AT L5-S1 LEVEL: ICD-10-CM

## 2018-02-12 DIAGNOSIS — E78.49 OTHER HYPERLIPIDEMIA: ICD-10-CM

## 2018-02-12 RX ORDER — DICYCLOMINE HYDROCHLORIDE 20 MG/1
20 TABLET ORAL EVERY 6 HOURS
Qty: 30 TAB | Refills: 0 | Status: SHIPPED | OUTPATIENT
Start: 2018-02-12 | End: 2018-04-13 | Stop reason: SDUPTHER

## 2018-02-12 RX ORDER — NIFEDIPINE 60 MG/1
60 TABLET, EXTENDED RELEASE ORAL DAILY
Qty: 90 TAB | Refills: 0 | Status: SHIPPED | OUTPATIENT
Start: 2018-02-12 | End: 2018-05-14 | Stop reason: SDUPTHER

## 2018-02-12 RX ORDER — SERTRALINE HYDROCHLORIDE 25 MG/1
25 TABLET, FILM COATED ORAL DAILY
Qty: 90 TAB | Refills: 0 | Status: SHIPPED | OUTPATIENT
Start: 2018-02-12 | End: 2018-10-15 | Stop reason: SDUPTHER

## 2018-02-12 RX ORDER — INDOMETHACIN 50 MG/1
50 CAPSULE ORAL
Qty: 60 CAP | Refills: 0 | Status: SHIPPED | OUTPATIENT
Start: 2018-02-12 | End: 2018-10-15 | Stop reason: SDUPTHER

## 2018-02-12 RX ORDER — PHENOL/SODIUM PHENOLATE
20 AEROSOL, SPRAY (ML) MUCOUS MEMBRANE DAILY
Qty: 90 TAB | Refills: 1 | Status: SHIPPED | OUTPATIENT
Start: 2018-02-12 | End: 2018-08-05 | Stop reason: SDUPTHER

## 2018-02-12 RX ORDER — ALPRAZOLAM 0.25 MG/1
0.25 TABLET ORAL
Qty: 10 TAB | Refills: 0 | Status: CANCELLED | OUTPATIENT
Start: 2018-02-12

## 2018-02-12 RX ORDER — ATORVASTATIN CALCIUM 10 MG/1
10 TABLET, FILM COATED ORAL DAILY
Qty: 30 TAB | Refills: 1 | Status: SHIPPED | OUTPATIENT
Start: 2018-02-12 | End: 2018-11-29 | Stop reason: SDUPTHER

## 2018-02-12 RX ORDER — ALPRAZOLAM 0.25 MG/1
0.25 TABLET ORAL
Qty: 10 TAB | Refills: 0 | Status: SHIPPED | OUTPATIENT
Start: 2018-02-12 | End: 2018-06-11 | Stop reason: SDUPTHER

## 2018-02-12 NOTE — PROGRESS NOTES
HISTORY OF PRESENT ILLNESS  Mitch Burris is a 50 y.o. female. HPI: Here for routine follow up and lab discussion. Has h/o hypertension. Today elevated blood pressure. Has not started taking lisinopril. Discussed importance of compliance with medication. She is taking procardia. No side effects of medication. Denies any headache, dizziness, no chest pain or trouble breathing, no arm or leg weakness. No nausea or vomiting, no weight or appetite changes. No urine or bowel complains, no palpitation, no diaphoresis. Repeat blood pressure even elevated than before. Visit Vitals    BP (!) 152/100 (BP 1 Location: Left arm, BP Patient Position: Sitting)    Pulse 77    Temp 97.9 °F (36.6 °C) (Oral)    Resp 16    Ht 5' 5\" (1.651 m)    Wt 192 lb (87.1 kg)    SpO2 98%    BMI 31.95 kg/m2     Also h/o prediabetes. Recent HBA1C stable compare to prior labs. Not on any medication. Also discussed high BMI. discussed high BMI. Discussed diet modification, calorie count and exercise. Discussed importance of weight loss. agree to do exercise and life style modification. Diet and exercise hand out given in AVS.    Also elevated lipid panel. Not on any medication. Elevated LDL. Discussed importance of starting statin. Agree to start it and discussed medication side effects. Review labs.    Lab Results   Component Value Date/Time    TSH 1.42 02/09/2018 10:33 AM     Lab Results   Component Value Date/Time    Hemoglobin A1c 6.1 (H) 02/09/2018 10:33 AM     Lab Results   Component Value Date/Time    WBC 3.3 (L) 03/19/2017 08:10 PM    HGB 11.9 (L) 03/19/2017 08:10 PM    HCT 37.2 03/19/2017 08:10 PM    PLATELET 038 40/55/3390 08:10 PM    MCV 87.5 03/19/2017 08:10 PM     Lab Results   Component Value Date/Time    Sodium 142 02/09/2018 10:33 AM    Potassium 4.8 02/09/2018 10:33 AM    Chloride 104 02/09/2018 10:33 AM    CO2 33 (H) 02/09/2018 10:33 AM    Anion gap 5 02/09/2018 10:33 AM    Glucose 115 (H) 02/09/2018 10:33 AM BUN 11 02/09/2018 10:33 AM    Creatinine 0.69 02/09/2018 10:33 AM    BUN/Creatinine ratio 16 02/09/2018 10:33 AM    GFR est AA >60 02/09/2018 10:33 AM    GFR est non-AA >60 02/09/2018 10:33 AM    Calcium 9.5 02/09/2018 10:33 AM    Bilirubin, total 0.2 02/09/2018 10:33 AM    AST (SGOT) 11 (L) 02/09/2018 10:33 AM    Alk. phosphatase 123 (H) 02/09/2018 10:33 AM    Protein, total 8.4 (H) 02/09/2018 10:33 AM    Albumin 4.0 02/09/2018 10:33 AM    Globulin 4.4 (H) 02/09/2018 10:33 AM    A-G Ratio 0.9 02/09/2018 10:33 AM    ALT (SGPT) 18 02/09/2018 10:33 AM     Lab Results   Component Value Date/Time    Cholesterol, total 297 (H) 02/09/2018 10:33 AM    HDL Cholesterol 53 02/09/2018 10:33 AM    LDL, calculated 190 (H) 02/09/2018 10:33 AM    VLDL, calculated 54 02/09/2018 10:33 AM    Triglyceride 270 (H) 02/09/2018 10:33 AM    CHOL/HDL Ratio 5.6 (H) 02/09/2018 10:33 AM      also h/o anxiety. Said she has been like that through out her life. On SSRi. Tolerating it well. Sleep is fair. No side effects. Using xanax as needed. Last prescription of 10 tabs are in 2016. Still has couple of tabs left and they are old so wanted to get a refill of prescription. Checked . No red flag. Has on and off lower back pain and radiation of pain in rt lower ext. Noted on prior lumbar spine x-ray degenerative changes. On symptomatic treatment. Discussed option of spine specialist for probably trigger point injection and she will think about it. No loss of urine or bowel control. No trouble ambulation. ROS: see HPI     Physical Exam   Constitutional: She is oriented to person, place, and time. No distress. Neck: No thyromegaly present. Cardiovascular: Normal rate, regular rhythm and normal heart sounds. Pulmonary/Chest:   CTA   Abdominal: Soft. Bowel sounds are normal. There is no tenderness. Musculoskeletal: She exhibits no edema. Back: RoM wnl. Lumbar spine    Lymphadenopathy:     She has no cervical adenopathy. Neurological: She is oriented to person, place, and time. Psychiatric: Her behavior is normal.       ASSESSMENT and PLAN    ICD-10-CM ICD-9-CM    1. Anxiety: on SSRI. Xanax as needed,. See HPI. For now stable. F41.9 300.00 sertraline (ZOLOFT) 25 mg tablet      ALPRAZolam (XANAX) 0.25 mg tablet   2. Essential hypertension: elevated. Repeat also elevated. Advised to start lisinopril. F/u next visit. Low salt diet. I10 401.9 NIFEdipine ER (PROCARDIA XL) 60 mg ER tablet   3. Gastroesophageal reflux disease without esophagitis; stable on symptomatic treatment. She has not done colonoscopy yet. She agree to get FIT test.  K21.9 530.81 dicyclomine (BENTYL) 20 mg tablet      Omeprazole delayed release (PRILOSEC D/R) 20 mg tablet   4. Other chronic pain/ lower back on and off : for now symptomatic treatment. Heating pad. Home exercise and NSAId.  G89.29 338.29 indomethacin (INDOCIN) 50 mg capsule   5. Degenerative disc disease at L5-S1 level M51.36 722.52    6. Other hyperlipidemia: starting lipitor. Discussed medication side effects. Also dicussed diet modification, exercise and importance of weight loss. E78.4 272.4 atorvastatin (LIPITOR) 10 mg tablet      LIPID PANEL      HEPATIC FUNCTION PANEL   7. Impaired fasting blood sugar: for now will do life style modification, weight loss. Will observe and f/u next visit. R73.01 790.21    8. BMI 31.0-31.9,adult: discussed high BMI. Discussed diet modification, calorie count and exercise. Discussed importance of weight loss. agree to do exercise and life style modification. Diet and exercise hand out given in AVS.    Z68.31 V85.31    9. Screening for colon cancer Z12.11 V76.51 OCCULT BLOOD, IMMUNOASSAY (FIT)   Pt understood and agree with the plan   Review HM     She will get her pap smear and mammogram by her ob/gyn   Follow-up Disposition:  Return in about 1 month (around 3/12/2018).

## 2018-02-12 NOTE — PATIENT INSTRUCTIONS
Learning About Low-Carbohydrate Diets for Weight Loss  What is a low-carbohydrate diet? Low-carb diets avoid foods that are high in carbohydrate. These high-carb foods include pasta, bread, rice, cereal, fruits, and starchy vegetables. Instead, these diets usually have you eat foods that are high in fat and protein. Many people lose weight quickly on a low-carb diet. But the early weight loss is water. People on this diet often gain the weight back after they start eating carbs again. Not all diet plans are safe or work well. A lot of the evidence shows that low-carb diets aren't healthy. That's because these diets often don't include healthy foods like fruits and vegetables. Losing weight safely means balancing protein, fat, and carbs with every meal and snack. And low-carb diets don't always provide the vitamins, minerals, and fiber you need. If you have a serious medical condition, talk to your doctor before you try any diet. These conditions include kidney disease, heart disease, type 2 diabetes, high cholesterol, and high blood pressure. If you are pregnant, it may not be safe for your baby if you are on a low-carb diet. How can you lose weight safely? You might have heard that a diet plan helped another person lose weight. But that doesn't mean that it will work for you. It is very hard to stay on a diet that includes lots of big changes in your eating habits. If you want to get to a healthy weight and stay there, making healthy lifestyle changes will often work better than dieting. These steps can help. · Make a plan for change. Work with your doctor to create a plan that is right for you. · See a dietitian. He or she can show you how to make healthy changes in your eating habits. · Manage stress. If you have a lot of stress in your life, it can be hard to focus on making healthy changes to your daily habits. · Track your food and activity.  You are likely to do better at losing weight if you keep track of what you eat and what you do. Follow-up care is a key part of your treatment and safety. Be sure to make and go to all appointments, and call your doctor if you are having problems. It's also a good idea to know your test results and keep a list of the medicines you take. Where can you learn more? Go to http://arden-emiliano.info/. Enter A121 in the search box to learn more about \"Learning About Low-Carbohydrate Diets for Weight Loss. \"  Current as of: May 12, 2017  Content Version: 11.4  © 9641-5826 [employer]. Care instructions adapted under license by [employer] (which disclaims liability or warranty for this information). If you have questions about a medical condition or this instruction, always ask your healthcare professional. [address] any warranty or liability for your use of this information. Prediabetes: Care Instructions  Your Care Instructions    Prediabetes is a warning sign that you are at risk for getting type 2 diabetes. It means that your blood sugar is higher than it should be. The food you eat turns into sugar, which your body uses for energy. Normally, an organ called the pancreas makes insulin, which allows the sugar in your blood to get into your body's cells. But when your body can't use insulin the right way, the sugar doesn't move into cells. It stays in your blood instead. This is called insulin resistance. The buildup of sugar in the blood causes prediabetes. The good news is that lifestyle changes may help you get your blood sugar back to normal and help you avoid or delay diabetes. Follow-up care is a key part of your treatment and safety. Be sure to make and go to all appointments, and call your doctor if you are having problems. It's also a good idea to know your test results and keep a list of the medicines you take. How can you care for yourself at home? · Watch your weight.  A healthy weight helps your body use insulin properly. · Limit the amount of calories, sweets, and unhealthy fat you eat. Ask your doctor if you should see a dietitian. A registered dietitian can help you create meal plans that fit your lifestyle. · Get at least 30 minutes of exercise on most days of the week. Exercise helps control your blood sugar. It also helps you maintain a healthy weight. Walking is a good choice. You also may want to do other activities, such as running, swimming, cycling, or playing tennis or team sports. · Do not smoke. Smoking can make prediabetes worse. If you need help quitting, talk to your doctor about stop-smoking programs and medicines. These can increase your chances of quitting for good. · If your doctor prescribed medicines, take them exactly as prescribed. Call your doctor if you think you are having a problem with your medicine. You will get more details on the specific medicines your doctor prescribes. When should you call for help? Watch closely for changes in your health, and be sure to contact your doctor if:  ? · You have any symptoms of diabetes. These may include:  ¨ Being thirsty more often. ¨ Urinating more. ¨ Being hungrier. ¨ Losing weight. ¨ Being very tired. ¨ Having blurry vision. ? · You have a wound that will not heal.   ? · You have an infection that will not go away. ? · You have problems with your blood pressure. ? · You want more information about diabetes and how you can keep from getting it. Where can you learn more? Go to http://arden-emiliano.info/. Enter I222 in the search box to learn more about \"Prediabetes: Care Instructions. \"  Current as of: March 13, 2017  Content Version: 11.4  © 9244-6942 [employer]. Care instructions adapted under license by [employer] (which disclaims liability or warranty for this information).  If you have questions about a medical condition or this instruction, always ask your healthcare professional. [address] any warranty or liability for your use of this information. Low Back Arthritis: Exercises  Your Care Instructions  Here are some examples of typical rehabilitation exercises for your condition. Start each exercise slowly. Ease off the exercise if you start to have pain. Your doctor or physical therapist will tell you when you can start these exercises and which ones will work best for you. When you are not being active, find a comfortable position for rest. Some people are comfortable on the floor or a medium-firm bed with a small pillow under their head and another under their knees. Some people prefer to lie on their side with a pillow between their knees. Don't stay in one position for too long. Take short walks (10 to 20 minutes) every 2 to 3 hours. Avoid slopes, hills, and stairs until you feel better. Walk only distances you can manage without pain, especially leg pain. How to do the exercises  Pelvic tilt    1. Lie on your back with your knees bent. 2. \"Brace\" your stomach-tighten your muscles by pulling in and imagining your belly button moving toward your spine. 3. Press your lower back into the floor. You should feel your hips and pelvis rock back. 4. Hold for 6 seconds while breathing smoothly. 5. Relax and allow your pelvis and hips to rock forward. 6. Repeat 8 to 12 times. Back stretches    1. Get down on your hands and knees on the floor. 2. Relax your head and allow it to droop. Round your back up toward the ceiling until you feel a nice stretch in your upper, middle, and lower back. Hold this stretch for as long as it feels comfortable, or about 15 to 30 seconds. 3. Return to the starting position with a flat back while you are on your hands and knees. 4. Let your back sway by pressing your stomach toward the floor. Lift your buttocks toward the ceiling. 5. Hold this position for 15 to 30 seconds.   6. Repeat 2 to 4 times.  Follow-up care is a key part of your treatment and safety. Be sure to make and go to all appointments, and call your doctor if you are having problems. It's also a good idea to know your test results and keep a list of the medicines you take. Where can you learn more? Go to http://arden-emiliano.info/. Enter G791 in the search box to learn more about \"Low Back Arthritis: Exercises. \"  Current as of: March 21, 2017  Content Version: 11.4  © 2875-7095 [employer]. Care instructions adapted under license by [employer] (which disclaims liability or warranty for this information). If you have questions about a medical condition or this instruction, always ask your healthcare professional. [address] any warranty or liability for your use of this information.

## 2018-02-12 NOTE — MR AVS SNAPSHOT
[facility] [address] 
[phone] Patient: Paul Finn MRN: [id number] :1965 Visit Information Date & Time Provider Department Dept. Phone Encounter #  
 2018  2:15 PM Archana Chilel, [facility] [phone] [id number] Follow-up Instructions Return in about 1 month (around 3/12/2018). Upcoming Health Maintenance Date Due COLONOSCOPY 1985 BREAST CANCER SCRN MAMMOGRAM 2017 PAP AKA CERVICAL CYTOLOGY 7/15/2017 DTaP/Tdap/Td series (2 - Td) 6/15/2026 Allergies as of 2018  Review Complete On: 2018 By: Archana Chilel MD  
  
 Severity Noted Reaction Type Reactions Codeine  2015    Other (comments) Doxycycline  2015    Other (comments) Morphine  2014    Other (comments) Chest burning Current Immunizations  Never Reviewed No immunizations on file. Not reviewed this visit You Were Diagnosed With   
  
 Codes Comments Anxiety    -  Primary ICD-10-CM: F41.9 ICD-9-CM: 300.00 Essential hypertension     ICD-10-CM: I10 
ICD-9-CM: 401.9 Gastroesophageal reflux disease without esophagitis     ICD-10-CM: K21.9 ICD-9-CM: 530.81 Other chronic pain     ICD-10-CM: G89.29 ICD-9-CM: 338.29 Degenerative disc disease at L5-S1 level     ICD-10-CM: M51.36 
ICD-9-CM: 722.52 Other hyperlipidemia     ICD-10-CM: E78.4 ICD-9-CM: 272.4 Impaired fasting blood sugar     ICD-10-CM: R73.01 
ICD-9-CM: 790.21 BMI 31.0-31.9,adult     ICD-10-CM: Z68.31 
ICD-9-CM: V85.31 Screening for colon cancer     ICD-10-CM: Z12.11 ICD-9-CM: V76.51 Vitals BP Pulse Temp Resp Height(growth percentile) Weight(growth percentile) (!) 152/100 (BP 1 Location: Left arm, BP Patient Position: Sitting) 77 97.9 °F (36.6 °C) (Oral) 16 5' 5\" (1.651 m) 192 lb (87.1 kg) LMP SpO2 BMI OB Status Smoking Status 01/28/2015 98% 31.95 kg/m2 Hysterectomy Never Smoker Vitals History BMI and BSA Data Body Mass Index Body Surface Area 31.95 kg/m 2 2 m 2 Preferred Pharmacy Pharmacy Name Phone [facility], [address] [place] [address] [phone] Your Updated Medication List  
  
   
This list is accurate as of: 2/12/18  3:08 PM.  Always use your most recent med list.  
  
  
  
  
 ALPRAZolam 0.25 mg tablet Commonly known as:  [place] [employer] [place] Take 1 Tab by mouth nightly as needed for Anxiety. Max Daily Amount: 0.25 mg.  
  
 atorvastatin 10 mg tablet Commonly known as:  LIPITOR Take 1 Tab by mouth daily. dicyclomine 20 mg tablet Commonly known as:  BENTYL Take 1 Tab by mouth every six (6) hours. indomethacin 50 mg capsule Commonly known as:  INDOCIN Take 1 Cap by mouth two (2) times daily as needed. lisinopril 20 mg tablet Commonly known as:  [place] Take 1 Tab by mouth daily. NIFEdipine ER 60 mg ER tablet Commonly known as:  PROCARDIA XL Take 1 Tab by mouth daily. Omeprazole delayed release 20 mg tablet Commonly known as:  PRILOSEC D/R Take 1 Tab by mouth daily. sertraline 25 mg tablet Commonly known as:  ZOLOFT Take 1 Tab by mouth daily. ZyrTEC 10 mg Cap Generic drug:  Cetirizine Take 10 mg by mouth daily. Prescriptions Printed Refills ALPRAZolam (XANAX) 0.25 mg tablet 0 Sig: Take 1 Tab by mouth nightly as needed for Anxiety. Max Daily Amount: 0.25 mg.  
 Class: Print Route: Oral  
  
Prescriptions Sent to Pharmacy Refills  
 sertraline (ZOLOFT) 25 mg tablet 0 Sig: Take 1 Tab by mouth daily. Class: Normal  
 Pharmacy: [facility], [address] [place] [address] Ph #: [phone]  Route: Oral  
 NIFEdipine ER (PROCARDIA XL) 60 mg ER tablet 0  
 Sig: Take 1 Tab by mouth daily. Class: Normal  
 Pharmacy: [facility], [facility] Ph #: [phone] Route: Oral  
 dicyclomine (BENTYL) 20 mg tablet 0 Sig: Take 1 Tab by mouth every six (6) hours. Class: Normal  
 Pharmacy: [facility] [address] [place] [address] Ph #: [phone] Route: Oral  
 Omeprazole delayed release (PRILOSEC D/R) 20 mg tablet 1 Sig: Take 1 Tab by mouth daily. Class: Normal  
 Pharmacy: [facility], [address] [place] [place] [address] Ph #: [phone] Route: Oral  
 indomethacin (INDOCIN) 50 mg capsule 0 Sig: Take 1 Cap by mouth two (2) times daily as needed. Class: Normal  
 Pharmacy: [facility], [address] [place] [address] Ph #: [phone] Route: Oral  
 atorvastatin (LIPITOR) 10 mg tablet 1 Sig: Take 1 Tab by mouth daily. Class: Normal  
 Pharmacy: [facility] [address] [place] [address] Ph #: [phone] Route: Oral  
  
Follow-up Instructions Return in about 1 month (around 3/12/2018). To-Do List   
 03/14/2018 Lab:  OCCULT BLOOD, IMMUNOASSAY (FIT)   
  
 04/12/2018 Lab:  HEPATIC FUNCTION PANEL   
  
 04/12/2018 Lab:  LIPID PANEL Patient Instructions Learning About Low-Carbohydrate Diets for Weight Loss What is a low-carbohydrate diet? Low-carb diets avoid foods that are high in carbohydrate. These high-carb foods include pasta, bread, rice, cereal, fruits, and starchy vegetables. Instead, these diets usually have you eat foods that are high in fat and protein. Many people lose weight quickly on a low-carb diet. But the early weight loss is water. People on this diet often gain the weight back after they start eating carbs again. Not all diet plans are safe or work well.  A lot of the evidence shows that low-carb diets aren't healthy. That's because these diets often don't include healthy foods like fruits and vegetables. Losing weight safely means balancing protein, fat, and carbs with every meal and snack. And low-carb diets don't always provide the vitamins, minerals, and fiber you need. If you have a serious medical condition, talk to your doctor before you try any diet. These conditions include kidney disease, heart disease, type 2 diabetes, high cholesterol, and high blood pressure. If you are pregnant, it may not be safe for your baby if you are on a low-carb diet. How can you lose weight safely? You might have heard that a diet plan helped another person lose weight. But that doesn't mean that it will work for you. It is very hard to stay on a diet that includes lots of big changes in your eating habits. If you want to get to a healthy weight and stay there, making healthy lifestyle changes will often work better than dieting. These steps can help. · Make a plan for change. Work with your doctor to create a plan that is right for you. · See a dietitian. He or she can show you how to make healthy changes in your eating habits. · Manage stress. If you have a lot of stress in your life, it can be hard to focus on making healthy changes to your daily habits. · Track your food and activity. You are likely to do better at losing weight if you keep track of what you eat and what you do. Follow-up care is a key part of your treatment and safety. Be sure to make and go to all appointments, and call your doctor if you are having problems. It's also a good idea to know your test results and keep a list of the medicines you take. Where can you learn more? Go to http://arden-emiliano.info/. Enter A121 in the search box to learn more about \"Learning About Low-Carbohydrate Diets for Weight Loss. \" Current as of: May 12, 2017 Content Version: 11.4 © 2465-5621 Healthwise, "[employer]. Care instructions adapted under license by [employer] (which disclaims liability or warranty for this information). If you have questions about a medical condition or this instruction, always ask your healthcare professional. [address] any warranty or liability for your use of this information. Prediabetes: Care Instructions Your Care Instructions Prediabetes is a warning sign that you are at risk for getting type 2 diabetes. It means that your blood sugar is higher than it should be. The food you eat turns into sugar, which your body uses for energy. Normally, an organ called the pancreas makes insulin, which allows the sugar in your blood to get into your body's cells. But when your body can't use insulin the right way, the sugar doesn't move into cells. It stays in your blood instead. This is called insulin resistance. The buildup of sugar in the blood causes prediabetes. The good news is that lifestyle changes may help you get your blood sugar back to normal and help you avoid or delay diabetes. Follow-up care is a key part of your treatment and safety. Be sure to make and go to all appointments, and call your doctor if you are having problems. It's also a good idea to know your test results and keep a list of the medicines you take. How can you care for yourself at home? · Watch your weight. A healthy weight helps your body use insulin properly. · Limit the amount of calories, sweets, and unhealthy fat you eat. Ask your doctor if you should see a dietitian. A registered dietitian can help you create meal plans that fit your lifestyle. · Get at least 30 minutes of exercise on most days of the week. Exercise helps control your blood sugar. It also helps you maintain a healthy weight. Walking is a good choice. You also may want to do other activities, such as running, swimming, cycling, or playing tennis or team sports. · Do not smoke. Smoking can make prediabetes worse. If you need help quitting, talk to your doctor about stop-smoking programs and medicines. These can increase your chances of quitting for good. · If your doctor prescribed medicines, take them exactly as prescribed. Call your doctor if you think you are having a problem with your medicine. You will get more details on the specific medicines your doctor prescribes. When should you call for help? Watch closely for changes in your health, and be sure to contact your doctor if: 
? · You have any symptoms of diabetes. These may include: ¨ Being thirsty more often. ¨ Urinating more. ¨ Being hungrier. ¨ Losing weight. ¨ Being very tired. ¨ Having blurry vision. ? · You have a wound that will not heal.  
? · You have an infection that will not go away. ? · You have problems with your blood pressure. ? · You want more information about diabetes and how you can keep from getting it. Where can you learn more? Go to http://arden-emiliano.info/. Enter I222 in the search box to learn more about \"Prediabetes: Care Instructions. \" Current as of: March 13, 2017 Content Version: 11.4 © 5836-8372 [employer]. Care instructions adapted under license by [employer] (which disclaims liability or warranty for this information). If you have questions about a medical condition or this instruction, always ask your healthcare professional. [address] any warranty or liability for your use of this information. Low Back Arthritis: Exercises Your Care Instructions Here are some examples of typical rehabilitation exercises for your condition. Start each exercise slowly. Ease off the exercise if you start to have pain. Your doctor or physical therapist will tell you when you can start these exercises and which ones will work best for you. When you are not being active, find a comfortable position for rest. Some people are comfortable on the floor or a medium-firm bed with a small pillow under their head and another under their knees. Some people prefer to lie on their side with a pillow between their knees. Don't stay in one position for too long. Take short walks (10 to 20 minutes) every 2 to 3 hours. Avoid slopes, hills, and stairs until you feel better. Walk only distances you can manage without pain, especially leg pain. How to do the exercises Pelvic tilt 1. Lie on your back with your knees bent. 2. \"Brace\" your stomach-tighten your muscles by pulling in and imagining your belly button moving toward your spine. 3. Press your lower back into the floor. You should feel your hips and pelvis rock back. 4. Hold for 6 seconds while breathing smoothly. 5. Relax and allow your pelvis and hips to rock forward. 6. Repeat 8 to 12 times. Back stretches 1. Get down on your hands and knees on the floor. 2. Relax your head and allow it to droop. Round your back up toward the ceiling until you feel a nice stretch in your upper, middle, and lower back. Hold this stretch for as long as it feels comfortable, or about 15 to 30 seconds. 3. Return to the starting position with a flat back while you are on your hands and knees. 4. Let your back sway by pressing your stomach toward the floor. Lift your buttocks toward the ceiling. 5. Hold this position for 15 to 30 seconds. 6. Repeat 2 to 4 times. Follow-up care is a key part of your treatment and safety. Be sure to make and go to all appointments, and call your doctor if you are having problems. It's also a good idea to know your test results and keep a list of the medicines you take. Where can you learn more? Go to http://arden-emiliano.info/. Enter H816 in the search box to learn more about \"Low Back Arthritis: Exercises. \" Current as of: March 21, 2017 Content Version: 11.4 © 0146-7977 Healthwise, Incorporated. Care instructions adapted under license by [employer] (which disclaims liability or warranty for this information). If you have questions about a medical condition or this instruction, always ask your healthcare professional. Denys Prim any warranty or liability for your use of this information. Introducing [facility]! [employer] introduces Wis.dm patient portal. Now you can access parts of your medical record, email your doctor's office, and request medication refills online. 1. In your internet browser, go to [email or web address] 2. Click on the First Time User? Click Here link in the Sign In box. You will see the New Member Sign Up page. 3. Enter your Wis.dm Access Code exactly as it appears below. You will not need to use this code after youve completed the sign-up process. If you do not sign up before the expiration date, you must request a new code. · Wis.dm Access Code: [email or web address] Expires: 3/5/2018  8:23 PM 
 
4. Enter the last four digits of your Social Security Number (xxxx) and Date of Birth (mm/dd/yyyy) as indicated and click Submit. You will be taken to the next sign-up page. 5. Create a Wis.dm ID. This will be your Wis.dm login ID and cannot be changed, so think of one that is secure and easy to remember. 6. Create a Wis.dm password. You can change your password at any time. 7. Enter your Password Reset Question and Answer. This can be used at a later time if you forget your password. 8. Enter your e-mail address. You will receive e-mail notification when new information is available in [address] 9. Click Sign Up. You can now view and download portions of your medical record. 10. Click the Download Summary menu link to download a portable copy of your medical information. If you have questions, please visit the Frequently Asked Questions section of the [employer] website. Remember, [employer] is NOT to be used for urgent needs. For medical emergencies, dial 911. Now available from your iPhone and Android! Please provide this summary of care documentation to your next provider. Your primary care clinician is listed as Félix Hernandez. If you have any questions after today's visit, please call [phone].

## 2018-02-12 NOTE — PROGRESS NOTES
1. Have you been to the ER, urgent care clinic since your last visit? Hospitalized since your last visit? [facility] 12/05/17    2. Have you seen or consulted any other health care providers outside of the [address] [place] [address] since your last visit? Include any pap smears or colon screening. No    Last pap 7/15/14 - Dr. Ana Casillas   Patient does not recall last mammogram; she will discuss with her OB/GYN Dr. Ana Casillas at next appointment.

## 2018-05-14 DIAGNOSIS — I10 ESSENTIAL HYPERTENSION: ICD-10-CM

## 2018-05-14 RX ORDER — NIFEDIPINE 60 MG/1
TABLET, EXTENDED RELEASE ORAL
Qty: 90 TAB | Refills: 0 | Status: SHIPPED | OUTPATIENT
Start: 2018-05-14 | End: 2018-08-15 | Stop reason: SDUPTHER

## 2018-06-06 DIAGNOSIS — F41.9 ANXIETY: ICD-10-CM

## 2018-06-06 NOTE — TELEPHONE ENCOUNTER
Pt called and stated she think she misplaced her script. The one that was kuldeepn to her in Feb. Pt is requesting     This patient contacted office for the following prescriptions to be filled:    Medication requested :   Requested Prescriptions     Pending Prescriptions Disp Refills    ALPRAZolam (XANAX) 0.25 mg tablet 10 Tab 0     Sig: Take 1 Tab by mouth nightly as needed for Anxiety. Max Daily Amount: 0.25 mg.      PCP: [id number] or Print: print  Mail order or Local pharmacy print    Scheduled appointment if not seen by current providers in office: 2/12/18 [facility] 7/3/18

## 2018-06-08 RX ORDER — ALPRAZOLAM 0.25 MG/1
0.25 TABLET ORAL
Qty: 10 TAB | Refills: 0 | OUTPATIENT
Start: 2018-06-08

## 2018-06-08 NOTE — TELEPHONE ENCOUNTER
Let pt know that checked . Last prescription was given in feb and I do not see them filled up. ? Please obtain more info.

## 2018-06-11 DIAGNOSIS — F41.9 ANXIETY: ICD-10-CM

## 2018-06-11 RX ORDER — ALPRAZOLAM 0.25 MG/1
0.25 TABLET ORAL
Qty: 10 TAB | Refills: 0 | Status: SHIPPED | OUTPATIENT
Start: 2018-06-11 | End: 2018-11-29 | Stop reason: ALTCHOICE

## 2018-07-14 DIAGNOSIS — I10 ESSENTIAL HYPERTENSION: ICD-10-CM

## 2018-07-17 RX ORDER — LISINOPRIL 20 MG/1
TABLET ORAL
Qty: 90 TAB | Refills: 0 | Status: SHIPPED | OUTPATIENT
Start: 2018-07-17 | End: 2018-10-15 | Stop reason: SDUPTHER

## 2018-08-05 DIAGNOSIS — K21.9 GASTROESOPHAGEAL REFLUX DISEASE WITHOUT ESOPHAGITIS: ICD-10-CM

## 2018-08-06 RX ORDER — OMEPRAZOLE 20 MG/1
CAPSULE, DELAYED RELEASE ORAL
Qty: 90 CAP | Refills: 1 | Status: SHIPPED | OUTPATIENT
Start: 2018-08-06 | End: 2018-10-29 | Stop reason: SDUPTHER

## 2018-08-15 DIAGNOSIS — I10 ESSENTIAL HYPERTENSION: ICD-10-CM

## 2018-08-17 RX ORDER — NIFEDIPINE 60 MG/1
TABLET, EXTENDED RELEASE ORAL
Qty: 90 TAB | Refills: 0 | Status: SHIPPED | OUTPATIENT
Start: 2018-08-17 | End: 2018-10-29 | Stop reason: SDUPTHER

## 2018-09-08 DIAGNOSIS — K21.9 GASTROESOPHAGEAL REFLUX DISEASE WITHOUT ESOPHAGITIS: ICD-10-CM

## 2018-09-08 NOTE — LETTER
9/10/2018 10:01 AM 
 
Ms. [address] [place] [address] Dear Ms. Alverda GibbonsHeddy Rubinstein missed you! Please call our office at [phone] and schedule a follow up appointment for your continued care and future prescription refills. Sincerely, Susan Geiger MD

## 2018-09-10 RX ORDER — DICYCLOMINE HYDROCHLORIDE 20 MG/1
TABLET ORAL
Qty: 30 TAB | Refills: 0 | Status: SHIPPED | OUTPATIENT
Start: 2018-09-10 | End: 2018-11-29 | Stop reason: SDUPTHER

## 2018-09-10 NOTE — TELEPHONE ENCOUNTER
Tried to reach patient but no answer and voicemail was full-unable to leave a message.  Sending get in touch letter

## 2018-10-15 DIAGNOSIS — I10 ESSENTIAL HYPERTENSION: ICD-10-CM

## 2018-10-15 DIAGNOSIS — G89.29 OTHER CHRONIC PAIN: ICD-10-CM

## 2018-10-15 DIAGNOSIS — F41.9 ANXIETY: ICD-10-CM

## 2018-10-15 RX ORDER — INDOMETHACIN 50 MG/1
CAPSULE ORAL
Qty: 60 CAP | Refills: 0 | Status: SHIPPED | OUTPATIENT
Start: 2018-10-15 | End: 2018-11-29 | Stop reason: ALTCHOICE

## 2018-10-15 RX ORDER — LISINOPRIL 20 MG/1
TABLET ORAL
Qty: 90 TAB | Refills: 0 | OUTPATIENT
Start: 2018-10-15

## 2018-10-15 RX ORDER — SERTRALINE HYDROCHLORIDE 25 MG/1
TABLET, FILM COATED ORAL
Qty: 30 TAB | Refills: 0 | Status: SHIPPED | OUTPATIENT
Start: 2018-10-15 | End: 2018-10-29 | Stop reason: SDUPTHER

## 2018-10-15 NOTE — TELEPHONE ENCOUNTER
Need to make a follow up appt. For now giving 30 days supply. Pt was told multiple times in the past to make a follow up appt.

## 2018-10-16 DIAGNOSIS — I10 ESSENTIAL HYPERTENSION: ICD-10-CM

## 2018-10-16 RX ORDER — LISINOPRIL 20 MG/1
TABLET ORAL
Qty: 15 TAB | Refills: 0 | Status: SHIPPED | OUTPATIENT
Start: 2018-10-16 | End: 2018-10-16 | Stop reason: SDUPTHER

## 2018-10-16 RX ORDER — LISINOPRIL 20 MG/1
20 TABLET ORAL DAILY
Qty: 15 TAB | Refills: 0 | Status: SHIPPED | OUTPATIENT
Start: 2018-10-16 | End: 2018-10-27 | Stop reason: SDUPTHER

## 2018-10-16 NOTE — TELEPHONE ENCOUNTER
Called patient and left message to please call office to schedule follow-up as only 30 days supply was sent and an appt is required for further refills.

## 2018-10-17 NOTE — TELEPHONE ENCOUNTER
Me          1:29 PM   Note      Called patient and left message to please call office to schedule follow-up as only 15 days supply was sent and an appt is required for further refills. Documentation from Matthew Chase 10/16/18.

## 2018-11-29 ENCOUNTER — OFFICE VISIT (OUTPATIENT)
Dept: FAMILY MEDICINE CLINIC | Age: 53
End: 2018-11-29

## 2018-11-29 VITALS
SYSTOLIC BLOOD PRESSURE: 126 MMHG | OXYGEN SATURATION: 97 % | HEART RATE: 94 BPM | HEIGHT: 65 IN | WEIGHT: 195 LBS | RESPIRATION RATE: 16 BRPM | DIASTOLIC BLOOD PRESSURE: 86 MMHG | TEMPERATURE: 98.3 F | BODY MASS INDEX: 32.49 KG/M2

## 2018-11-29 DIAGNOSIS — F41.9 ANXIETY: Primary | ICD-10-CM

## 2018-11-29 DIAGNOSIS — E78.49 OTHER HYPERLIPIDEMIA: ICD-10-CM

## 2018-11-29 DIAGNOSIS — M79.641 PAIN OF RIGHT HAND: ICD-10-CM

## 2018-11-29 DIAGNOSIS — K21.9 GASTROESOPHAGEAL REFLUX DISEASE WITHOUT ESOPHAGITIS: ICD-10-CM

## 2018-11-29 DIAGNOSIS — R73.01 IMPAIRED FASTING BLOOD SUGAR: ICD-10-CM

## 2018-11-29 DIAGNOSIS — I10 ESSENTIAL HYPERTENSION: ICD-10-CM

## 2018-11-29 DIAGNOSIS — Z12.11 SCREENING FOR COLON CANCER: ICD-10-CM

## 2018-11-29 DIAGNOSIS — L30.9 ECZEMA, UNSPECIFIED TYPE: ICD-10-CM

## 2018-11-29 DIAGNOSIS — G89.29 OTHER CHRONIC PAIN: ICD-10-CM

## 2018-11-29 RX ORDER — LISINOPRIL 20 MG/1
20 TABLET ORAL DAILY
Qty: 90 TAB | Refills: 0 | Status: SHIPPED | OUTPATIENT
Start: 2018-11-29 | End: 2019-02-09 | Stop reason: SDUPTHER

## 2018-11-29 RX ORDER — NAPROXEN 500 MG/1
500 TABLET ORAL 2 TIMES DAILY WITH MEALS
Qty: 60 TAB | Refills: 1 | Status: SHIPPED | OUTPATIENT
Start: 2018-11-29 | End: 2019-04-09 | Stop reason: ALTCHOICE

## 2018-11-29 RX ORDER — NIFEDIPINE 60 MG/1
60 TABLET, EXTENDED RELEASE ORAL DAILY
Qty: 30 TAB | Refills: 0 | Status: SHIPPED | OUTPATIENT
Start: 2018-11-29 | End: 2019-01-14 | Stop reason: SDUPTHER

## 2018-11-29 RX ORDER — ATORVASTATIN CALCIUM 10 MG/1
10 TABLET, FILM COATED ORAL DAILY
Qty: 30 TAB | Refills: 1 | Status: SHIPPED | OUTPATIENT
Start: 2018-11-29 | End: 2019-05-20 | Stop reason: SDDI

## 2018-11-29 RX ORDER — SERTRALINE HYDROCHLORIDE 25 MG/1
25 TABLET, FILM COATED ORAL DAILY
Qty: 30 TAB | Refills: 0 | Status: SHIPPED | OUTPATIENT
Start: 2018-11-29 | End: 2019-02-09 | Stop reason: SDUPTHER

## 2018-11-29 RX ORDER — DICYCLOMINE HYDROCHLORIDE 20 MG/1
20 TABLET ORAL EVERY 6 HOURS
Qty: 90 TAB | Refills: 0 | Status: SHIPPED | OUTPATIENT
Start: 2018-11-29 | End: 2019-05-20 | Stop reason: SDUPTHER

## 2018-11-29 RX ORDER — ALPRAZOLAM 0.25 MG/1
0.25 TABLET ORAL
Qty: 10 TAB | Refills: 0 | Status: CANCELLED | OUTPATIENT
Start: 2018-11-29

## 2018-11-29 RX ORDER — INDOMETHACIN 50 MG/1
CAPSULE ORAL
Qty: 60 CAP | Refills: 0 | Status: CANCELLED | OUTPATIENT
Start: 2018-11-29

## 2018-11-29 RX ORDER — OMEPRAZOLE 20 MG/1
20 CAPSULE, DELAYED RELEASE ORAL DAILY
Qty: 30 CAP | Refills: 0 | Status: SHIPPED | OUTPATIENT
Start: 2018-11-29 | End: 2019-01-05 | Stop reason: SDUPTHER

## 2018-11-29 NOTE — PATIENT INSTRUCTIONS
Please contact office if you do not hear back with your result for labs in 1-2 wks, or had any radiological study appointment or result in 1-2 wks, any specialist referral appointments. Carpal Tunnel Syndrome: Exercises Your Care Instructions Here are some examples of typical rehabilitation exercises for your condition. Start each exercise slowly. Ease off the exercise if you start to have pain. Your doctor or your physical or occupational therapist will tell you when you can start these exercises and which ones will work best for you. Warm-up stretches When you no longer have pain or numbness, you can do exercises to help prevent carpal tunnel syndrome from coming back. Do not do any stretch or movement that is uncomfortable or painful. 1. Rotate your wrist up, down, and from side to side. Repeat 4 times. 2. Stretch your fingers far apart. Relax them, and then stretch them again. Repeat 4 times. 3. Stretch your thumb by pulling it back gently, holding it, and then releasing it. Repeat 4 times. How to do the exercises Prayer stretch 1. Start with your palms together in front of your chest just below your chin. 2. Slowly lower your hands toward your waistline, keeping your hands close to your stomach and your palms together until you feel a mild to moderate stretch under your forearms. 3. Hold for at least 15 to 30 seconds. Repeat 2 to 4 times. Wrist flexor stretch 1. Extend your arm in front of you with your palm up. 2. Bend your wrist, pointing your hand toward the floor. 3. With your other hand, gently bend your wrist farther until you feel a mild to moderate stretch in your forearm. 4. Hold for at least 15 to 30 seconds. Repeat 2 to 4 times. Wrist extensor stretch 1. Repeat steps 1 through 4 of the stretch above, but begin with your extended hand palm down. Follow-up care is a key part of your treatment and safety.  Be sure to make and go to all appointments, and call your doctor if you are having problems. It's also a good idea to know your test results and keep a list of the medicines you take. Where can you learn more? Go to http://arden-emiliano.info/. Enter J699 in the search box to learn more about \"Carpal Tunnel Syndrome: Exercises. \" Current as of: November 29, 2017 Content Version: 11.8 © 7435-4725 [employer]. Care instructions adapted under license by [employer] (which disclaims liability or warranty for this information). If you have questions about a medical condition or this instruction, always ask your healthcare professional. [place] [address] any warranty or liability for your use of this information. Carpal Tunnel Syndrome: Care Instructions Your Care Instructions Carpal tunnel syndrome is a nerve problem. It can cause tingling, numbness, weakness, or pain in the fingers, thumb, and hand. The median nerve and several tough tissues called tendons run through a space in the wrist called the carpal tunnel. The repeated hand motions used in work and some hobbies and sports can put pressure on the nerve. Pregnancy and several conditions, including diabetes, arthritis, and an underactive thyroid, also can cause carpal tunnel syndrome. You may be able to limit an activity or do it differently to reduce your symptoms. You also can take other steps to feel better. If your symptoms are mild, 1 to 2 weeks of home treatment are likely to ease your pain. Surgery is needed only if other treatments do not work. Follow-up care is a key part of your treatment and safety. Be sure to make and go to all appointments, and call your doctor if you are having problems. It's also a good idea to know your test results and keep a list of the medicines you take. How can you care for yourself at home? · If possible, stop or reduce the activity that causes your symptoms.  If you cannot stop the activity, take frequent breaks to rest and stretch or change hand positions to do a task. Try switching hands, such as when using a computer mouse. · Try to avoid bending or twisting your wrists. · Ask your doctor if you can take an over-the-counter pain medicine, such as acetaminophen (Tylenol), ibuprofen (Advil, Motrin), or naproxen (Aleve). Be safe with medicines. Read and follow all instructions on the label. · If your doctor prescribes corticosteroid medicine to help reduce pain and swelling, take it exactly as prescribed. Call your doctor if you think you are having a problem with your medicine. · Put ice or a cold pack on your wrist for 10 to 20 minutes at a time to ease pain. Put a thin cloth between the ice and your skin. · If your doctor or your physical or occupational therapist tells you to wear a wrist splint, wear it as directed to keep your wrist in a neutral position. This also eases pressure on your median nerve. · Ask your doctor whether you should have physical or occupational therapy to learn how to do tasks differently. · Try a yoga class to stretch your muscles and build strength in your hands and wrists. Yoga has been shown to ease carpal tunnel symptoms. To prevent carpal tunnel · When working at a computer, keep your hands and wrists in line with your forearms. Hold your elbows close to your sides. Take a break every 10 to 15 minutes. · Try these exercises: 
? Warm up: Rotate your wrist up, down, and from side to side. Repeat this 4 times. Stretch your fingers far apart, relax them, then stretch them again. Repeat 4 times. Stretch your thumb by pulling it back gently, holding it, and then releasing it. Repeat 4 times. ? Prayer stretch: Start with your palms together in front of your chest just below your chin.  Slowly lower your hands toward your waistline while keeping your hands close to your stomach and your palms together until you feel a mild to moderate stretch under your forearms. Hold for 10 to 20 seconds. Repeat 4 times. ? Wrist flexor stretch: Hold your arm in front of you with your palm up. Bend your wrist, pointing your hand toward the floor. With your other hand, gently bend your wrist further until you feel a mild to moderate stretch in your forearm. Hold for 10 to 20 seconds. Repeat 4 times. ? Wrist extensor stretch: Repeat the steps for the wrist flexor stretch, but begin with your extended hand palm down. · Squeeze a rubber exercise ball several times a day to keep your hands and fingers strong. · Avoid holding objects (such as a book) in one position for a long time. When possible, use your whole hand to grasp an object. Using just the thumb and index finger can put stress on the wrist. 
· Do not smoke. It can make this condition worse by reducing blood flow to the median nerve. If you need help quitting, talk to your doctor about stop-smoking programs and medicines. These can increase your chances of quitting for good. When should you call for help? Watch closely for changes in your health, and be sure to contact your doctor if: 
  · Your pain or other problems do not get better with home care.  
  · You want more information about physical or occupational therapy.  
  · You have side effects of your corticosteroid medicine, such as: 
? Weight gain. ? Mood changes. ? Trouble sleeping. ? Bruising easily.  
  · You have any other problems with your medicine. Where can you learn more? Go to http://arden-emiliano.info/. Enter R432 in the search box to learn more about \"Carpal Tunnel Syndrome: Care Instructions. \" Current as of: November 29, 2017 Content Version: 11.8 © 8788-8518 [employer]. Care instructions adapted under license by [employer] (which disclaims liability or warranty for this information).  If you have questions about a medical condition or this instruction, always ask your healthcare professional. [address] any warranty or liability for your use of this information. Prediabetes: Care Instructions Your Care Instructions Prediabetes is a warning sign that you are at risk for getting type 2 diabetes. It means that your blood sugar is higher than it should be. The food you eat turns into sugar, which your body uses for energy. Normally, an organ called the pancreas makes insulin, which allows the sugar in your blood to get into your body's cells. But when your body can't use insulin the right way, the sugar doesn't move into cells. It stays in your blood instead. This is called insulin resistance. The buildup of sugar in the blood causes prediabetes. The good news is that lifestyle changes may help you get your blood sugar back to normal and help you avoid or delay diabetes. Follow-up care is a key part of your treatment and safety. Be sure to make and go to all appointments, and call your doctor if you are having problems. It's also a good idea to know your test results and keep a list of the medicines you take. How can you care for yourself at home? · Watch your weight. A healthy weight helps your body use insulin properly. · Limit the amount of calories, sweets, and unhealthy fat you eat. Ask your doctor if you should see a dietitian. A registered dietitian can help you create meal plans that fit your lifestyle. · Get at least 30 minutes of exercise on most days of the week. Exercise helps control your blood sugar. It also helps you maintain a healthy weight. Walking is a good choice. You also may want to do other activities, such as running, swimming, cycling, or playing tennis or team sports. · Do not smoke. Smoking can make prediabetes worse. If you need help quitting, talk to your doctor about stop-smoking programs and medicines. These can increase your chances of quitting for good. · If your doctor prescribed medicines, take them exactly as prescribed. Call your doctor if you think you are having a problem with your medicine. You will get more details on the specific medicines your doctor prescribes. When should you call for help? Watch closely for changes in your health, and be sure to contact your doctor if: 
  · You have any symptoms of diabetes. These may include: 
? Being thirsty more often. ? Urinating more. ? Being hungrier. ? Losing weight. ? Being very tired. ? Having blurry vision.  
  · You have a wound that will not heal.  
  · You have an infection that will not go away.  
  · You have problems with your blood pressure.  
  · You want more information about diabetes and how you can keep from getting it. Where can you learn more? Go to http://arden-emiliano.info/. Enter I222 in the search box to learn more about \"Prediabetes: Care Instructions. \" Current as of: December 7, 2017 Content Version: 11.8 © 8593-0490 [employer]. Care instructions adapted under license by [employer] (which disclaims liability or warranty for this information). If you have questions about a medical condition or this instruction, always ask your healthcare professional. [address] any warranty or liability for your use of this information. Low Sodium Diet (2,000 Milligram): Care Instructions Your Care Instructions Too much sodium causes your body to hold on to extra water. This can raise your blood pressure and force your heart and kidneys to work harder. In very serious cases, this could cause you to be put in the hospital. It might even be life-threatening. By limiting sodium, you will feel better and lower your risk of serious problems. The most common source of sodium is salt. People get most of the salt in their diet from canned, prepared, and packaged foods.  Fast food and restaurant meals also are very high in sodium. Your doctor will probably limit your sodium to less than 2,000 milligrams (mg) a day. This limit counts all the sodium in prepared and packaged foods and any salt you add to your food. Follow-up care is a key part of your treatment and safety. Be sure to make and go to all appointments, and call your doctor if you are having problems. It's also a good idea to know your test results and keep a list of the medicines you take. How can you care for yourself at home? Read food labels · Read labels on cans and food packages. The labels tell you how much sodium is in each serving. Make sure that you look at the serving size. If you eat more than the serving size, you have eaten more sodium. · Food labels also tell you the Percent Daily Value for sodium. Choose products with low Percent Daily Values for sodium. · Be aware that sodium can come in forms other than salt, including monosodium glutamate (MSG), sodium citrate, and sodium bicarbonate (baking soda). MSG is often added to Asian food. When you eat out, you can sometimes ask for food without MSG or added salt. Buy low-sodium foods · Buy foods that are labeled \"unsalted\" (no salt added), \"sodium-free\" (less than 5 mg of sodium per serving), or \"low-sodium\" (less than 140 mg of sodium per serving). Foods labeled \"reduced-sodium\" and \"light sodium\" may still have too much sodium. Be sure to read the label to see how much sodium you are getting. · Buy fresh vegetables, or frozen vegetables without added sauces. Buy low-sodium versions of canned vegetables, soups, and other canned goods. Prepare low-sodium meals · Cut back on the amount of salt you use in cooking. This will help you adjust to the taste. Do not add salt after cooking. One teaspoon of salt has about 2,300 mg of sodium. · Take the salt shaker off the table.  
· Flavor your food with garlic, lemon juice, onion, vinegar, herbs, and spices. Do not use soy sauce, lite soy sauce, steak sauce, onion salt, garlic salt, celery salt, mustard, or ketchup on your food. · Use low-sodium salad dressings, sauces, and ketchup. Or make your own salad dressings and sauces without adding salt. · Use less salt (or none) when recipes call for it. You can often use half the salt a recipe calls for without losing flavor. Other foods such as rice, pasta, and grains do not need added salt. · Rinse canned vegetables, and cook them in fresh water. This removes somebut not allof the salt. · Avoid water that is naturally high in sodium or that has been treated with water softeners, which add sodium. Call your local water company to find out the sodium content of your water supply. If you buy bottled water, read the label and choose a sodium-free brand. Avoid high-sodium foods · Avoid eating: 
? Smoked, cured, salted, and canned meat, fish, and poultry. ? Ham, retana, hot dogs, and luncheon meats. ? Regular, hard, and processed cheese and regular peanut butter. ? Crackers with salted tops, and other salted snack foods such as pretzels, chips, and salted popcorn. ? Frozen prepared meals, unless labeled low-sodium. ? Canned and dried soups, broths, and bouillon, unless labeled sodium-free or low-sodium. ? Canned vegetables, unless labeled sodium-free or low-sodium. ? [place] fries, pizza, tacos, and other fast foods. ? Pickles, olives, ketchup, and other condiments, especially soy sauce, unless labeled sodium-free or low-sodium. Where can you learn more? Go to http://arden-emiliano.info/. Enter T023 in the search box to learn more about \"Low Sodium Diet (2,000 Milligram): Care Instructions. \" Current as of: March 29, 2018 Content Version: 11.8 © 4779-4614 [employer].  Care instructions adapted under license by "[employer]" (which disclaims liability or warranty for this information). If you have questions about a medical condition or this instruction, always ask your healthcare professional. [address] any warranty or liability for your use of this information. Learning About Low-Fat Eating What is low-fat eating? Most food has some fat in it. Your body needs some fat to be healthy. But some kinds of fats are healthier than others. In a low-fat eating plan, you try to choose healthier fats and eat fewer unhealthy fats. Healthy fats include olive and canola oil. Try to avoid eating too much saturated fat (such as in cheese and meats) and trans fat (a type of fat found in many packaged snack foods and other baked goods). You do not need to cut all fat from your diet. But you can make healthier choices about the types and amount of fat you eat. Even though it is a good idea to choose healthier fats, it is still important to be careful of how much fat you eat, because all fats are high in calories. What are the different types of fats? Unhealthy fats · Saturated fat. Saturated fats are mostly in animal foods, such as meat and dairy foods. Tropical oils, such as coconut oil, palm oil, and cocoa butter, are also saturated fats. · Trans fat. Trans fats include shortening, partially hydrogenated vegetable oils, and hydrogenated vegetable oils. Trans fats are made when a liquid fat is turned into a solid fat (for example, when corn oil is made into stick margarine). They are in many processed foods, such as cookies, crackers, and snack foods. Healthy fats · Monounsaturated fat. Monounsaturated fats are liquid at room temperature but get solid when refrigerated. Eating foods that are high in this fat may help lower your \"bad\" (LDL) cholesterol, keep your \"good\" (HDL) cholesterol level up, and lower your chances of getting coronary artery disease.  This fat is found in canola oil, olive oil, peanut oil, olives, avocados, nuts, and nut butters. · Polyunsaturated fat. Polyunsaturated fats are liquid at room temperature. They are in safflower, sunflower, and corn oils. They are also the main fat in seafood. Omega-3 fatty acids are types of polyunsaturated fat. Eating fish may lower your chances of getting coronary artery disease. Fatty fish such as salmon and mackerel contain these healthy fatty acids. So do ground flaxseeds and flaxseed oil, soybeans, walnuts, and seeds. Why cut down on unhealthy fats? Eating foods that contain saturated fats can raise the LDL (\"bad\") cholesterol in your blood. Having a high level of LDL cholesterol increases your chance of hardening of the arteries (atherosclerosis), which can lead to heart disease, heart attack, and stroke. Trans fat raises the level of \"bad\" LDL cholesterol in your blood and may lower the \"good\" HDL cholesterol in your blood. Trans fat can raise your risk of heart disease, heart attack, and stroke. In general: · No more than 10% of your daily calories should come from saturated fat. This is about 20 grams in a 2,000-calorie diet. · No more than 10% of your daily calories should come from polyunsaturated fat. This is about 20 grams in a 2,000-calorie diet. · Monounsaturated fats can be up to 15% of your daily calories. This is about 25 to 30 grams in a 2,000-calorie diet. If you're not sure how much fat you should be eating or how many calories you need each day to stay at a healthy weight, talk to a registered dietitian. He or she can help you create a plan that's right for you. What can you do to cut down on fat? Foods like cheese, butter, sausage, and desserts can have a lot of unhealthy fats. Try these tips for healthier meals at home and when you eat out. At home · Fill up on fruits, vegetables, and whole grains.  
· Think of meat as a side dish instead of as the main part of your meal. 
 · When you do eat meat, make it extra-lean ground beef (97% lean), ground turkey breast (without skin added), meats with fat trimmed off before cooking, or skinless chicken. · Try main dishes that use whole wheat pasta, brown rice, dried beans, or vegetables. · Use cooking methods that use little or no fat, such as broiling, steaming, or grilling. Use cooking spray instead of oil. If you use oil, use a monounsaturated oil, such as canola or olive oil. · Read food labels on canned, bottled, or packaged foods. Choose those with little saturated fat and no trans fat. When eating out at a restaurant · Order foods that are broiled or poached instead of fried or breaded. · Cut back on the amount of butter or margarine that you use on bread. Use small amounts of olive oil instead. · Order sauces, gravies, and salad dressings on the side, and use only a little. · When you order pasta, choose tomato sauce instead of cream sauce. · Ask for salsa with your baked potato instead of sour cream, butter, cheese, or retana. Where can you learn more? Go to http://arden-emiliano.info/. Enter A947 in the search box to learn more about \"Learning About Low-Fat Eating. \" Current as of: March 29, 2018 Content Version: 11.8 © 8018-0663 [employer]. Care instructions adapted under license by [employer] (which disclaims liability or warranty for this information). If you have questions about a medical condition or this instruction, always ask your healthcare professional. [place] [address] any warranty or liability for your use of this information. Anxiety Disorder: Care Instructions Your Care Instructions Anxiety is a normal reaction to stress. Difficult situations can cause you to have symptoms such as sweaty palms and a nervous feeling. In an anxiety disorder, the symptoms are far more severe.  Constant worry, muscle tension, trouble sleeping, nausea and diarrhea, and other symptoms can make normal daily activities difficult or impossible. These symptoms may occur for no reason, and they can affect your work, school, or social life. Medicines, counseling, and self-care can all help. Follow-up care is a key part of your treatment and safety. Be sure to make and go to all appointments, and call your doctor if you are having problems. It's also a good idea to know your test results and keep a list of the medicines you take. How can you care for yourself at home? · Take medicines exactly as directed. Call your doctor if you think you are having a problem with your medicine. · Go to your counseling sessions and follow-up appointments. · Recognize and accept your anxiety. Then, when you are in a situation that makes you anxious, say to yourself, \"This is not an emergency. I feel uncomfortable, but I am not in danger. I can keep going even if I feel anxious. \" · Be kind to your body: 
? Relieve tension with exercise or a massage. ? Get enough rest. 
? Avoid alcohol, caffeine, nicotine, and illegal drugs. They can increase your anxiety level and cause sleep problems. ? Learn and do relaxation techniques. See below for more about these techniques. · Engage your mind. Get out and do something you enjoy. Go to a funny movie, or take a walk or hike. Plan your day. Having too much or too little to do can make you anxious. · Keep a record of your symptoms. Discuss your fears with a good friend or family member, or join a support group for people with similar problems. Talking to others sometimes relieves stress. · Get involved in social groups, or volunteer to help others. Being alone sometimes makes things seem worse than they are. · Get at least 30 minutes of exercise on most days of the week to relieve stress. Walking is a good choice.  You also may want to do other activities, such as running, swimming, cycling, or playing tennis or team sports. Relaxation techniques Do relaxation exercises 10 to 20 minutes a day. You can play soothing, relaxing music while you do them, if you wish. · Tell others in your house that you are going to do your relaxation exercises. Ask them not to disturb you. · Find a comfortable place, away from all distractions and noise. · Lie down on your back, or sit with your back straight. · Focus on your breathing. Make it slow and steady. · Breathe in through your nose. Breathe out through either your nose or mouth. · Breathe deeply, filling up the area between your navel and your rib cage. Breathe so that your belly goes up and down. · Do not hold your breath. · Breathe like this for 5 to 10 minutes. Notice the feeling of calmness throughout your whole body. As you continue to breathe slowly and deeply, relax by doing the following for another 5 to 10 minutes: · Tighten and relax each muscle group in your body. You can begin at your toes and work your way up to your head. · Imagine your muscle groups relaxing and becoming heavy. · Empty your mind of all thoughts. · Let yourself relax more and more deeply. · Become aware of the state of calmness that surrounds you. · When your relaxation time is over, you can bring yourself back to alertness by moving your fingers and toes and then your hands and feet and then stretching and moving your entire body. Sometimes people fall asleep during relaxation, but they usually wake up shortly afterward. · Always give yourself time to return to full alertness before you drive a car or do anything that might cause an accident if you are not fully alert. Never play a relaxation tape while you drive a car. When should you call for help? Call 911 anytime you think you may need emergency care. For example, call if: 
  · You feel you cannot stop from hurting yourself or someone else.  Keep the numbers for these national suicide hotlines: 5-356-637-TALK (8-997.751.8803) and 4-863-BZNAYIK (4-652.346.2349). If you or someone you know talks about suicide or feeling hopeless, get help right away. 
 Watch closely for changes in your health, and be sure to contact your doctor if: 
  · You have anxiety or fear that affects your life.  
  · You have symptoms of anxiety that are new or different from those you had before. Where can you learn more? Go to http://arden-emiliano.info/. Enter P754 in the search box to learn more about \"Anxiety Disorder: Care Instructions. \" Current as of: December 7, 2017 Content Version: 11.8 © 7081-7573 Healthwise, Incorporated. Care instructions adapted under license by [employer] (which disclaims liability or warranty for this information). If you have questions about a medical condition or this instruction, always ask your healthcare professional. [place] [address] any warranty or liability for your use of this information.

## 2018-11-29 NOTE — PROGRESS NOTES
1. Have you been to the ER, urgent care clinic since your last visit? Hospitalized since your last visit? No 
2. Have you seen or consulted any other health care providers outside of the [address] [place] [address] since your last visit? Include any pap smears or colon screening. No 
 
Last pap smear 7/15/14 - Dr. Mychal White Last mammogram - patient does not recall

## 2018-11-29 NOTE — PROGRESS NOTES
HISTORY OF PRESENT ILLNESS Chadwick Alvarenga is a 48 y.o. female. HPI: here for routine follow up. Has been non complaint with follow ups. Discussed importance of being  complaint with medication, instructions and follow ups. Has h/o anxiety. Jody Tracy it is present since long time, most of her life. On medication. Has on and off worsening of symptoms depends on social trigger. No panic attacks. Sleep has been some disturbed. No appetite or weight changes. No thoughts of hurting herself or others. Offered her going up on the dose of zoloft but refused it at this time. Taking xanax as needed. Also decliend seeing counselor in the past but agreed this time. Done referral.  
Visit Vitals /86 (BP 1 Location: Left arm, BP Patient Position: Sitting) Pulse 94 Temp 98.3 °F (36.8 °C) (Oral) Resp 16 Ht 5' 5\" (1.651 m) Wt 195 lb (88.5 kg) SpO2 97% BMI 32.45 kg/m² Review medication list, vitals, problem list,allergies. Also h/o hypertension. Stable during visit. Taking medication with compliance . No side effects. H/o GERD. Stable on current medication. Denies any abdominal pain. No nausea or vomiting. No appetite changes. C/o rt hand pain more during night time. Her work involves lots of typing. Said numbness associated with it and occasional tingling. No radiation of pain. No fall or injury. No swelling or redness. Going on since sometime and not taken any medication. Review prior labs. Prediabetes. She has not done any life style modification. discussed high BMI. Discussed diet modification, calorie count and exercise. Discussed importance of weight loss. agree to do exercise and life style modification. Diet and exercise hand out given in AV[facility]. H/o chronic back pain. 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 On and off flair. Taking indomethacin and now switching to naproxen. No loss of urine or bowel complains. No lower ext weakness, tingling or numbness Visit Vitals /86 (BP 1 Location: Left arm, BP Patient Position: Sitting) Pulse 94 Temp 98.3 °F (36.8 °C) (Oral) Resp 16 Ht 5' 5\" (1.651 m) Wt 195 lb (88.5 kg) SpO2 97% BMI 32.45 kg/m² Review medication list, vitals, problem list,allergies. Denies any headache, dizziness, no chest pain or trouble breathing, no arm or leg weakness. No nausea or vomiting, no weight or appetite changes. No urine or bowel complains, no palpitation, no diaphoresis. Also h/o GERD. Stable. Has on and off loose bowel movement. Stable lately. Discussed colonoscopy as screening. Will send her to GI>  
H/o hyperlipidemia. She has not started any life style modification. Discussed importance of it. She will work on it. Review prior labs. Lab Results Component Value Date/Time WBC 3.3 (L) 03/19/2017 08:10 PM  
 HGB 11.9 (L) 03/19/2017 08:10 PM  
 HCT 37.2 03/19/2017 08:10 PM  
 PLATELET 275 77/64/0727 08:10 PM  
 MCV 87.5 03/19/2017 08:10 PM  
 
Lab Results Component Value Date/Time Sodium 142 02/09/2018 10:33 AM  
 Potassium 4.8 02/09/2018 10:33 AM  
 Chloride 104 02/09/2018 10:33 AM  
 CO2 33 (H) 02/09/2018 10:33 AM  
 Anion gap 5 02/09/2018 10:33 AM  
 Glucose 115 (H) 02/09/2018 10:33 AM  
 BUN 11 02/09/2018 10:33 AM  
 Creatinine 0.69 02/09/2018 10:33 AM  
 BUN/Creatinine ratio 16 02/09/2018 10:33 AM  
 GFR est AA >60 02/09/2018 10:33 AM  
 GFR est non-AA >60 02/09/2018 10:33 AM  
 Calcium 9.5 02/09/2018 10:33 AM  
 Bilirubin, total 0.2 02/09/2018 10:33 AM  
 AST (SGOT) 11 (L) 02/09/2018 10:33 AM  
 Alk. phosphatase 123 (H) 02/09/2018 10:33 AM  
 Protein, total 8.4 (H) 02/09/2018 10:33 AM  
 Albumin 4.0 02/09/2018 10:33 AM  
 Globulin 4.4 (H) 02/09/2018 10:33 AM  
 A-G Ratio 0.9 02/09/2018 10:33 AM  
 ALT (SGPT) 18 02/09/2018 10:33 AM  
 
Lab Results Component Value Date/Time  Cholesterol, total 297 (H) 02/09/2018 10:33 AM  
 HDL Cholesterol 53 02/09/2018 10:33 AM  
 LDL, calculated 190 (H) 02/09/2018 10:33 AM  
 VLDL, calculated 54 02/09/2018 10:33 AM  
 Triglyceride 270 (H) 02/09/2018 10:33 AM  
 CHOL/HDL Ratio 5.6 (H) 02/09/2018 10:33 AM  
 
Lab Results Component Value Date/Time TSH 1.42 02/09/2018 10:33 AM  
 
Lab Results Component Value Date/Time Hemoglobin A1c 6.1 (H) 02/09/2018 10:33 AM  
 
 
ROS: see HPI Physical Exam  
Constitutional: She is oriented to person, place, and time. No distress. Neck: No thyromegaly present. Cardiovascular: Normal heart sounds. Pulmonary/Chest: No respiratory distress. She has no wheezes. Abdominal: Soft. There is no tenderness. Musculoskeletal: She exhibits no edema. No point tenderness over lumbar spine. Lymphadenopathy:  
  She has no cervical adenopathy. Neurological: She is oriented to person, place, and time. Psychiatric: Her behavior is normal.  
 
 
ASSESSMENT and PLAN 
  ICD-10-CM ICD-9-CM 1. Anxiety: on and off. Through out her life. Refused to go up on the dose of zoloft. Agree to see counselor. Will f/u next visit. Taken her off of xanax as she is taking as needed. F41.9 300.00 sertraline (ZOLOFT) 25 mg tablet REFERRAL TO PSYCHOLOGY METABOLIC PANEL, COMPREHENSIVE  
   TSH 3RD GENERATION 2. Other hyperlipidemia: diet modification discussed. Repeat labs. E78.49 272.4 atorvastatin (LIPITOR) 10 mg tablet LIPID PANEL  
   METABOLIC PANEL, COMPREHENSIVE 3. Gastroesophageal reflux disease without esophagitis: stable on current medication. Has sensitive stomach. For now encouraged her to complete her colon cancer screening. She agree to do a referral.  K21.9 530.81 dicyclomine (BENTYL) 20 mg tablet  
   omeprazole (PRILOSEC) 20 mg capsule 4. Other chronic pain/ lower back on and off : as needed naproxen, heating pad. Advised to take medication with food. Home exercise. G89.29 338.29 naproxen (NAPROSYN) 500 mg tablet 5. Essential hypertension: stable at this time. Low salt diet. Exercise as tolerated. Will continue current plan. I10 401.9 lisinopril (PRINIVIL, ZESTRIL) 20 mg tablet NIFEdipine ER (PROCARDIA XL) 60 mg ER tablet METABOLIC PANEL, COMPREHENSIVE 6. Impaired fasting blood sugar: discussed importance of diet modification and exercise. R73.01 790.21 HEMOGLOBIN A1C WITH EAG 7. Eczema, unspecified type: more over hand, fingers. Advised to put vaseline, otc hydrocortisone as needed. Avoid water exposure while washing dishes or cleaning . Advised to wear gloves . L30.9 692.9 8. Pain of right hand: possible carpal tunnel syndrome. Sending for EMG. Mean time home exercise, ice and wrist support . NSAID with food as needed. M79.641 729.5 EMG ONE EXTREMITY UPPER RT  
9. Screening for colon cancer Z12.11 V76.51 REFERRAL TO GASTROENTEROLOGY Pt understood and agree with the plan Review hM Follow-up Disposition: 
Return in about 2 months (around 1/29/2019).

## 2019-01-05 DIAGNOSIS — K21.9 GASTROESOPHAGEAL REFLUX DISEASE WITHOUT ESOPHAGITIS: ICD-10-CM

## 2019-01-07 RX ORDER — OMEPRAZOLE 20 MG/1
CAPSULE, DELAYED RELEASE ORAL
Qty: 30 CAP | Refills: 0 | Status: SHIPPED | OUTPATIENT
Start: 2019-01-07 | End: 2019-02-09 | Stop reason: SDUPTHER

## 2019-01-14 DIAGNOSIS — I10 ESSENTIAL HYPERTENSION: ICD-10-CM

## 2019-01-14 RX ORDER — NIFEDIPINE 60 MG/1
TABLET, EXTENDED RELEASE ORAL
Qty: 30 TAB | Refills: 0 | Status: SHIPPED | OUTPATIENT
Start: 2019-01-14 | End: 2019-02-09 | Stop reason: SDUPTHER

## 2019-02-09 DIAGNOSIS — K21.9 GASTROESOPHAGEAL REFLUX DISEASE WITHOUT ESOPHAGITIS: ICD-10-CM

## 2019-02-09 DIAGNOSIS — I10 ESSENTIAL HYPERTENSION: ICD-10-CM

## 2019-02-09 DIAGNOSIS — F41.9 ANXIETY: ICD-10-CM

## 2019-02-09 NOTE — LETTER
2/27/2019 2:31 PM 
 
Ms. Beth Soliz [facility] [id number] Dear Ms. Enoch Nelsy Moran missed you! Upon review of your medical record it does appear that you are overdue for a follow-up. This is to ensure continuing prescriptions. Please call our office at [phone] and schedule a follow up appointment for your continued care. Sincerely, Gabriela Macias MD

## 2019-02-11 RX ORDER — SERTRALINE HYDROCHLORIDE 25 MG/1
TABLET, FILM COATED ORAL
Qty: 90 TAB | Refills: 0 | Status: SHIPPED | OUTPATIENT
Start: 2019-02-11 | End: 2019-05-13 | Stop reason: SDUPTHER

## 2019-02-11 RX ORDER — LISINOPRIL 20 MG/1
TABLET ORAL
Qty: 90 TAB | Refills: 0 | Status: SHIPPED | OUTPATIENT
Start: 2019-02-11 | End: 2019-05-13 | Stop reason: SDUPTHER

## 2019-02-11 RX ORDER — NIFEDIPINE 60 MG/1
TABLET, EXTENDED RELEASE ORAL
Qty: 90 TAB | Refills: 0 | Status: SHIPPED | OUTPATIENT
Start: 2019-02-11 | End: 2019-05-13 | Stop reason: SDUPTHER

## 2019-02-11 RX ORDER — OMEPRAZOLE 20 MG/1
CAPSULE, DELAYED RELEASE ORAL
Qty: 30 CAP | Refills: 0 | Status: SHIPPED | OUTPATIENT
Start: 2019-02-11 | End: 2019-03-15 | Stop reason: SDUPTHER

## 2019-03-15 DIAGNOSIS — K21.9 GASTROESOPHAGEAL REFLUX DISEASE WITHOUT ESOPHAGITIS: ICD-10-CM

## 2019-03-18 RX ORDER — OMEPRAZOLE 20 MG/1
CAPSULE, DELAYED RELEASE ORAL
Qty: 30 CAP | Refills: 0 | Status: SHIPPED | OUTPATIENT
Start: 2019-03-18 | End: 2019-04-17 | Stop reason: SDUPTHER

## 2019-04-08 DIAGNOSIS — G89.29 OTHER CHRONIC PAIN: ICD-10-CM

## 2019-04-09 RX ORDER — INDOMETHACIN 50 MG/1
CAPSULE ORAL
Qty: 30 CAP | Refills: 0 | Status: SHIPPED | OUTPATIENT
Start: 2019-04-09 | End: 2019-05-13 | Stop reason: SDUPTHER

## 2019-04-09 NOTE — TELEPHONE ENCOUNTER
Pt called and would like to f/u on the medication request. Please call pt at your earliest convenience.

## 2019-04-09 NOTE — TELEPHONE ENCOUNTER
Giving 30 tabs . This she suppose to take only as needed. Also need to schedule a follow up. Please let pt know .

## 2019-04-17 DIAGNOSIS — K21.9 GASTROESOPHAGEAL REFLUX DISEASE WITHOUT ESOPHAGITIS: ICD-10-CM

## 2019-04-18 RX ORDER — OMEPRAZOLE 20 MG/1
CAPSULE, DELAYED RELEASE ORAL
Qty: 30 CAP | Refills: 0 | Status: SHIPPED | OUTPATIENT
Start: 2019-04-18 | End: 2019-05-13 | Stop reason: SDUPTHER

## 2019-05-13 DIAGNOSIS — F41.9 ANXIETY: ICD-10-CM

## 2019-05-13 DIAGNOSIS — K21.9 GASTROESOPHAGEAL REFLUX DISEASE WITHOUT ESOPHAGITIS: ICD-10-CM

## 2019-05-13 DIAGNOSIS — I10 ESSENTIAL HYPERTENSION: ICD-10-CM

## 2019-05-13 DIAGNOSIS — G89.29 OTHER CHRONIC PAIN: ICD-10-CM

## 2019-05-13 NOTE — TELEPHONE ENCOUNTER
This patient contacted office for the following prescriptions to be filled:    Medication requested :   Requested Prescriptions     Pending Prescriptions Disp Refills    sertraline (ZOLOFT) 25 mg tablet 90 Tab 0    lisinopril (PRINIVIL, ZESTRIL) 20 mg tablet 90 Tab 0    omeprazole (PRILOSEC) 20 mg capsule 30 Cap 0    NIFEdipine ER (PROCARDIA XL) 60 mg ER tablet 90 Tab 0     PCP: Jude Holm or Print: [facility]ie or Local pharmacy [facility]     Scheduled appointment if not seen by current providers in office:  LOV 11/25/2018 f/u 5/20/2019

## 2019-05-14 RX ORDER — SERTRALINE HYDROCHLORIDE 25 MG/1
25 TABLET, FILM COATED ORAL DAILY
Qty: 90 TAB | Refills: 0 | Status: SHIPPED | OUTPATIENT
Start: 2019-05-14 | End: 2019-05-14 | Stop reason: SDUPTHER

## 2019-05-14 RX ORDER — LISINOPRIL 20 MG/1
20 TABLET ORAL DAILY
Qty: 90 TAB | Refills: 0 | Status: SHIPPED | OUTPATIENT
Start: 2019-05-14 | End: 2019-05-14 | Stop reason: SDUPTHER

## 2019-05-14 RX ORDER — SERTRALINE HYDROCHLORIDE 25 MG/1
TABLET, FILM COATED ORAL
Qty: 90 TAB | Refills: 0 | Status: SHIPPED | OUTPATIENT
Start: 2019-05-14 | End: 2019-08-15 | Stop reason: SDUPTHER

## 2019-05-14 RX ORDER — LISINOPRIL 20 MG/1
TABLET ORAL
Qty: 90 TAB | Refills: 0 | Status: SHIPPED | OUTPATIENT
Start: 2019-05-14 | End: 2019-08-15 | Stop reason: SDUPTHER

## 2019-05-14 RX ORDER — NIFEDIPINE 60 MG/1
60 TABLET, EXTENDED RELEASE ORAL DAILY
Qty: 90 TAB | Refills: 0 | Status: SHIPPED | OUTPATIENT
Start: 2019-05-14 | End: 2019-05-14 | Stop reason: SDUPTHER

## 2019-05-14 RX ORDER — NIFEDIPINE 60 MG/1
TABLET, EXTENDED RELEASE ORAL
Qty: 90 TAB | Refills: 0 | Status: SHIPPED | OUTPATIENT
Start: 2019-05-14 | End: 2019-08-15 | Stop reason: SDUPTHER

## 2019-05-14 RX ORDER — OMEPRAZOLE 20 MG/1
20 CAPSULE, DELAYED RELEASE ORAL DAILY
Qty: 90 CAP | Refills: 0 | Status: SHIPPED | OUTPATIENT
Start: 2019-05-14 | End: 2019-08-15 | Stop reason: SDUPTHER

## 2019-05-14 RX ORDER — INDOMETHACIN 50 MG/1
CAPSULE ORAL
Qty: 30 CAP | Refills: 0 | Status: SHIPPED | OUTPATIENT
Start: 2019-05-14 | End: 2019-07-28 | Stop reason: SDUPTHER

## 2019-05-16 ENCOUNTER — HOSPITAL ENCOUNTER (OUTPATIENT)
Dept: LAB | Age: 54
Discharge: HOME | End: 2019-05-16
Payer: COMMERCIAL

## 2019-05-16 DIAGNOSIS — F41.9 ANXIETY: ICD-10-CM

## 2019-05-16 DIAGNOSIS — R73.01 IMPAIRED FASTING BLOOD SUGAR: ICD-10-CM

## 2019-05-16 DIAGNOSIS — I10 ESSENTIAL HYPERTENSION: ICD-10-CM

## 2019-05-16 DIAGNOSIS — E78.49 OTHER HYPERLIPIDEMIA: ICD-10-CM

## 2019-05-16 LAB
ALBUMIN SERPL-MCNC: 3.6 G/DL (ref 3.4–5)
ALBUMIN/GLOB SERPL: 1 {RATIO} (ref 0.8–1.7)
ALP SERPL-CCNC: 105 U/L (ref 45–117)
ALT SERPL-CCNC: 24 U/L (ref 13–56)
ANION GAP SERPL CALC-SCNC: 7 MMOL/L (ref 3–18)
AST SERPL-CCNC: 12 U/L (ref 15–37)
BILIRUB SERPL-MCNC: 0.2 MG/DL (ref 0.2–1)
BUN SERPL-MCNC: 14 MG/DL (ref 7–18)
BUN/CREAT SERPL: 17 (ref 12–20)
CALCIUM SERPL-MCNC: 9.2 MG/DL (ref 8.5–10.1)
CHLORIDE SERPL-SCNC: 106 MMOL/L (ref 100–108)
CHOLEST SERPL-MCNC: 232 MG/DL
CO2 SERPL-SCNC: 27 MMOL/L (ref 21–32)
CREAT SERPL-MCNC: 0.81 MG/DL (ref 0.6–1.3)
EST. AVERAGE GLUCOSE BLD GHB EST-MCNC: 131 MG/DL
GLOBULIN SER CALC-MCNC: 3.6 G/DL (ref 2–4)
GLUCOSE SERPL-MCNC: 114 MG/DL (ref 74–99)
HBA1C MFR BLD: 6.2 % (ref 4.2–5.6)
HDLC SERPL-MCNC: 45 MG/DL (ref 40–60)
HDLC SERPL: 5.2 {RATIO} (ref 0–5)
LDLC SERPL CALC-MCNC: 138.2 MG/DL (ref 0–100)
LIPID PROFILE,FLP: ABNORMAL
POTASSIUM SERPL-SCNC: 4.2 MMOL/L (ref 3.5–5.5)
PROT SERPL-MCNC: 7.2 G/DL (ref 6.4–8.2)
SODIUM SERPL-SCNC: 140 MMOL/L (ref 136–145)
TRIGL SERPL-MCNC: 244 MG/DL (ref ?–150)
TSH SERPL DL<=0.05 MIU/L-ACNC: 1.55 UIU/ML (ref 0.36–3.74)
VLDLC SERPL CALC-MCNC: 48.8 MG/DL

## 2019-05-16 PROCEDURE — 84443 ASSAY THYROID STIM HORMONE: CPT

## 2019-05-16 PROCEDURE — 83036 HEMOGLOBIN GLYCOSYLATED A1C: CPT

## 2019-05-16 PROCEDURE — 80053 COMPREHEN METABOLIC PANEL: CPT

## 2019-05-16 PROCEDURE — 36415 COLL VENOUS BLD VENIPUNCTURE: CPT

## 2019-05-16 PROCEDURE — 80061 LIPID PANEL: CPT

## 2019-05-17 NOTE — PROGRESS NOTES
HBA1C went up compare to prior labs. Also elevated lipid panel. Advised pt to keep an appt to discuss lab result further.

## 2019-05-20 ENCOUNTER — OFFICE VISIT (OUTPATIENT)
Dept: FAMILY MEDICINE CLINIC | Age: 54
End: 2019-05-20

## 2019-05-20 VITALS
OXYGEN SATURATION: 96 % | SYSTOLIC BLOOD PRESSURE: 126 MMHG | HEART RATE: 77 BPM | TEMPERATURE: 98.2 F | WEIGHT: 203.8 LBS | RESPIRATION RATE: 16 BRPM | BODY MASS INDEX: 33.95 KG/M2 | DIASTOLIC BLOOD PRESSURE: 86 MMHG | HEIGHT: 65 IN

## 2019-05-20 DIAGNOSIS — K58.8 OTHER IRRITABLE BOWEL SYNDROME: ICD-10-CM

## 2019-05-20 DIAGNOSIS — M54.9 DISCOMFORT OF BACK: ICD-10-CM

## 2019-05-20 DIAGNOSIS — R73.01 IMPAIRED FASTING BLOOD SUGAR: ICD-10-CM

## 2019-05-20 DIAGNOSIS — I10 ESSENTIAL HYPERTENSION: Primary | ICD-10-CM

## 2019-05-20 DIAGNOSIS — K21.9 GASTROESOPHAGEAL REFLUX DISEASE WITHOUT ESOPHAGITIS: ICD-10-CM

## 2019-05-20 DIAGNOSIS — E78.5 DYSLIPIDEMIA: ICD-10-CM

## 2019-05-20 DIAGNOSIS — R53.83 OTHER FATIGUE: ICD-10-CM

## 2019-05-20 DIAGNOSIS — E07.9 THYROID CONDITION: ICD-10-CM

## 2019-05-20 RX ORDER — DICYCLOMINE HYDROCHLORIDE 20 MG/1
20 TABLET ORAL
Qty: 90 TAB | Refills: 1 | Status: SHIPPED | OUTPATIENT
Start: 2019-05-20 | End: 2020-05-13 | Stop reason: SDUPTHER

## 2019-05-20 RX ORDER — ATORVASTATIN CALCIUM 10 MG/1
10 TABLET, FILM COATED ORAL
Qty: 30 TAB | Refills: 1 | Status: SHIPPED | OUTPATIENT
Start: 2019-05-20 | End: 2019-10-16 | Stop reason: SDUPTHER

## 2019-05-20 NOTE — PATIENT INSTRUCTIONS
Please make an appt with ob/gyn Learning About Low-Fat Eating What is low-fat eating? Most food has some fat in it. Your body needs some fat to be healthy. But some kinds of fats are healthier than others. In a low-fat eating plan, you try to choose healthier fats and eat fewer unhealthy fats. Healthy fats include olive and canola oil. Try to avoid eating too much saturated fat (such as in cheese and meats) and trans fat (a type of fat found in many packaged snack foods and other baked goods). You do not need to cut all fat from your diet. But you can make healthier choices about the types and amount of fat you eat. Even though it is a good idea to choose healthier fats, it is still important to be careful of how much fat you eat, because all fats are high in calories. What are the different types of fats? Unhealthy fats · Saturated fat. Saturated fats are mostly in animal foods, such as meat and dairy foods. Tropical oils, such as coconut oil, palm oil, and cocoa butter, are also saturated fats. · Trans fat. Trans fats include shortening, partially hydrogenated vegetable oils, and hydrogenated vegetable oils. Trans fats are made when a liquid fat is turned into a solid fat (for example, when corn oil is made into stick margarine). They are in many processed foods, such as cookies, crackers, and snack foods. Healthy fats · Monounsaturated fat. Monounsaturated fats are liquid at room temperature but get solid when refrigerated. Eating foods that are high in this fat may help lower your \"bad\" (LDL) cholesterol, keep your \"good\" (HDL) cholesterol level up, and lower your chances of getting coronary artery disease. This fat is found in canola oil, olive oil, peanut oil, olives, avocados, nuts, and nut butters. · Polyunsaturated fat. Polyunsaturated fats are liquid at room temperature. They are in safflower, sunflower, and corn oils. They are also the main fat in seafood.  Omega-3 fatty acids are types of polyunsaturated fat. Eating fish may lower your chances of getting coronary artery disease. Fatty fish such as salmon and mackerel contain these healthy fatty acids. So do ground flaxseeds and flaxseed oil, soybeans, walnuts, and seeds. Why cut down on unhealthy fats? Eating foods that contain saturated fats can raise the LDL (\"bad\") cholesterol in your blood. Having a high level of LDL cholesterol increases your chance of hardening of the arteries (atherosclerosis), which can lead to heart disease, heart attack, and stroke. Trans fat raises the level of \"bad\" LDL cholesterol in your blood and may lower the \"good\" HDL cholesterol in your blood. Trans fat can raise your risk of heart disease, heart attack, and stroke. In general: · No more than 10% of your daily calories should come from saturated fat. This is about 20 grams in a 2,000-calorie diet. · No more than 10% of your daily calories should come from polyunsaturated fat. This is about 20 grams in a 2,000-calorie diet. · Monounsaturated fats can be up to 15% of your daily calories. This is about 25 to 30 grams in a 2,000-calorie diet. If you're not sure how much fat you should be eating or how many calories you need each day to stay at a healthy weight, talk to a registered dietitian. He or she can help you create a plan that's right for you. What can you do to cut down on fat? Foods like cheese, butter, sausage, and desserts can have a lot of unhealthy fats. Try these tips for healthier meals at home and when you eat out. At home · Fill up on fruits, vegetables, and whole grains. · Think of meat as a side dish instead of as the main part of your meal. 
· When you do eat meat, make it extra-lean ground beef (97% lean), ground turkey breast (without skin added), meats with fat trimmed off before cooking, or skinless chicken. · Try main dishes that use whole wheat pasta, brown rice, dried beans, or vegetables. · Use cooking methods that use little or no fat, such as broiling, steaming, or grilling. Use cooking spray instead of oil. If you use oil, use a monounsaturated oil, such as canola or olive oil. · Read food labels on canned, bottled, or packaged foods. Choose those with little saturated fat and no trans fat. When eating out at a restaurant · Order foods that are broiled or poached instead of fried or breaded. · Cut back on the amount of butter or margarine that you use on bread. Use small amounts of olive oil instead. · Order sauces, gravies, and salad dressings on the side, and use only a little. · When you order pasta, choose tomato sauce instead of cream sauce. · Ask for salsa with your baked potato instead of sour cream, butter, cheese, or retana. Where can you learn more? Go to http://ardenRouxbeemiliano.info/. Enter W576 in the search box to learn more about \"Learning About Low-Fat Eating. \" Current as of: March 28, 2018 Content Version: 11.9 © 9752-7638 [employer]. Care instructions adapted under license by [employer] (which disclaims liability or warranty for this information). If you have questions about a medical condition or this instruction, always ask your healthcare professional. [address] any warranty or liability for your use of this information. Learning About Low-Carbohydrate Diets for Weight Loss What is a low-carbohydrate diet? Low-carb diets avoid foods that are high in carbohydrate. These high-carb foods include pasta, bread, rice, cereal, fruits, and starchy vegetables. Instead, these diets usually have you eat foods that are high in fat and protein. Many people lose weight quickly on a low-carb diet. But the early weight loss is water. People on this diet often gain the weight back after they start eating carbs again. Not all diet plans are safe or work well.  A lot of the evidence shows that low-carb diets aren't healthy. That's because these diets often don't include healthy foods like fruits and vegetables. Losing weight safely means balancing protein, fat, and carbs with every meal and snack. And low-carb diets don't always provide the vitamins, minerals, and fiber you need. If you have a serious medical condition, talk to your doctor before you try any diet. These conditions include kidney disease, heart disease, type 2 diabetes, high cholesterol, and high blood pressure. If you are pregnant, it may not be safe for your baby if you are on a low-carb diet. How can you lose weight safely? You might have heard that a diet plan helped another person lose weight. But that doesn't mean that it will work for you. It is very hard to stay on a diet that includes lots of big changes in your eating habits. If you want to get to a healthy weight and stay there, making healthy lifestyle changes will often work better than dieting. These steps can help. · Make a plan for change. Work with your doctor to create a plan that is right for you. · See a dietitian. He or she can show you how to make healthy changes in your eating habits. · Manage stress. If you have a lot of stress in your life, it can be hard to focus on making healthy changes to your daily habits. · Track your food and activity. You are likely to do better at losing weight if you keep track of what you eat and what you do. Follow-up care is a key part of your treatment and safety. Be sure to make and go to all appointments, and call your doctor if you are having problems. It's also a good idea to know your test results and keep a list of the medicines you take. Where can you learn more? Go to http://arden-emiliano.info/. Enter A121 in the search box to learn more about \"Learning About Low-Carbohydrate Diets for Weight Loss. \" Current as of: March 28, 2018 Content Version: 11.9 © 9408-8158 [employer]. Care instructions adapted under license by [employer] (which disclaims liability or warranty for this information). If you have questions about a medical condition or this instruction, always ask your healthcare professional. [address] any warranty or liability for your use of this information. Back Pain: Care Instructions Your Care Instructions Back pain has many possible causes. It is often related to problems with muscles and ligaments of the back. It may also be related to problems with the nerves, discs, or bones of the back. Moving, lifting, standing, sitting, or sleeping in an awkward way can strain the back. Sometimes you don't notice the injury until later. Arthritis is another common cause of back pain. Although it may hurt a lot, back pain usually improves on its own within several weeks. Most people recover in 12 weeks or less. Using good home treatment and being careful not to stress your back can help you feel better sooner. Follow-up care is a key part of your treatment and safety. Be sure to make and go to all appointments, and call your doctor if you are having problems. It's also a good idea to know your test results and keep a list of the medicines you take. How can you care for yourself at home? · Sit or lie in positions that are most comfortable and reduce your pain. Try one of these positions when you lie down: ? Lie on your back with your knees bent and supported by large pillows. ? Lie on the floor with your legs on the seat of a sofa or chair. ? Lie on your side with your knees and hips bent and a pillow between your legs. ? Lie on your stomach if it does not make pain worse. · Do not sit up in bed, and avoid soft couches and twisted positions. Bed rest can help relieve pain at first, but it delays healing. Avoid bed rest after the first day of back pain. · Change positions every 30 minutes. If you must sit for long periods of time, take breaks from sitting. Get up and walk around, or lie in a comfortable position. · Try using a heating pad on a low or medium setting for 15 to 20 minutes every 2 or 3 hours. Try a warm shower in place of one session with the heating pad. · You can also try an ice pack for 10 to 15 minutes every 2 to 3 hours. Put a thin cloth between the ice pack and your skin. · Take pain medicines exactly as directed. ? If the doctor gave you a prescription medicine for pain, take it as prescribed. ? If you are not taking a prescription pain medicine, ask your doctor if you can take an over-the-counter medicine. · Take short walks several times a day. You can start with 5 to 10 minutes, 3 or 4 times a day, and work up to longer walks. Walk on level surfaces and avoid hills and stairs until your back is better. · Return to work and other activities as soon as you can. Continued rest without activity is usually not good for your back. · To prevent future back pain, do exercises to stretch and strengthen your back and stomach. Learn how to use good posture, safe lifting techniques, and proper body mechanics. When should you call for help? Call your doctor now or seek immediate medical care if: 
  · You have new or worsening numbness in your legs.  
  · You have new or worsening weakness in your legs. (This could make it hard to stand up.)  
  · You lose control of your bladder or bowels.  
 Watch closely for changes in your health, and be sure to contact your doctor if: 
  · You have a fever, lose weight, or don't feel well.  
  · You do not get better as expected. Where can you learn more? Go to http://arden-emiliano.info/. Enter A128 in the search box to learn more about \"Back Pain: Care Instructions. \" Current as of: September 20, 2018 Content Version: 11.9 © 9279-6128 Healthwise, Incorporated. Care instructions adapted under license by [employer] (which disclaims liability or warranty for this information). If you have questions about a medical condition or this instruction, always ask your healthcare professional. [address] any warranty or liability for your use of this information.

## 2019-05-20 NOTE — PROGRESS NOTES
1. Have you been to the ER, urgent care clinic since your last visit? Hospitalized since your last visit? No 
 
2. Have you seen or consulted any other health care providers outside of the [address] [place] [address] since your last visit? Include any pap smears or colon screening. No 
 
Chief Complaint Patient presents with  Anxiety  Cholesterol Problem  GERD  Blood sugar problem  LOW BACK PAIN  
 Dry Skin

## 2019-05-21 NOTE — PROGRESS NOTES
HISTORY OF PRESENT ILLNESS  Toribio Sharp is a 48 y.o. female. HPI: here for follow up. H/o hypertension. Stable vitals. Asypmtomatic. Denies any headache, dizziness, no chest pain or trouble breathing, no arm or leg weakness. No nausea or vomiting, no weight or appetite changes, no mood changes . No urine or bowel complains, no palpitation, no diaphoresis. No abdominal pain. No cold or cough. No leg swelling. No fever. No sleep trouble. Visit Vitals  /86 (BP 1 Location: Left arm, BP Patient Position: Sitting)   Pulse 77   Temp 98.2 °F (36.8 °C) (Oral)   Resp 16   Ht 5' 5\" (1.651 m)   Wt 203 lb 12.8 oz (92.4 kg)   SpO2 96%   BMI 33.91 kg/m²     Review medication list, vitals, problem list,allergies. H/o GERD. Asymptomatic. No nausea or vomiting. No abdominal pain. No weight or appetite changes. Review labs. Prediabetes. Discussed importance of diet modification. Exercise and weight loss importance also discussed. she is currently not being complaint with diet or exercise. Again also discussed high BMI. H/o IBS. On and off symptomatic medication for abdominal cramps. Currently no bowel movement changes. Chronic lower back pain. Radiation of pain in lower ext on and off. No weakness in lower ext. No loss of urine or bowel control. Pain is mild to moderate . On and off. Prior h/o back surgery. Post laminectomy syndrome. Said pain is mild to moderate in intensity but now it is gradually getting worse so asking for specialist referral.     Dyslipidemia. Not taking any statin as worried about side effects. Discussed benefit of statin to prevent CAD and she agrees to take it. Discussed side effects of muscle ache, myalgia, leg cramps, fatigue etc.  Fidel Cary lately she has been feeling fatigue.    Lab Results   Component Value Date/Time    WBC 3.3 (L) 03/19/2017 08:10 PM    HGB 11.9 (L) 03/19/2017 08:10 PM    HCT 37.2 03/19/2017 08:10 PM    PLATELET 025 17/88/2730 08:10 PM    MCV 87.5 03/19/2017 08:10 PM     Lab Results   Component Value Date/Time    Sodium 140 05/16/2019 10:43 AM    Potassium 4.2 05/16/2019 10:43 AM    Chloride 106 05/16/2019 10:43 AM    CO2 27 05/16/2019 10:43 AM    Anion gap 7 05/16/2019 10:43 AM    Glucose 114 (H) 05/16/2019 10:43 AM    BUN 14 05/16/2019 10:43 AM    Creatinine 0.81 05/16/2019 10:43 AM    BUN/Creatinine ratio 17 05/16/2019 10:43 AM    GFR est AA >60 05/16/2019 10:43 AM    GFR est non-AA >60 05/16/2019 10:43 AM    Calcium 9.2 05/16/2019 10:43 AM    Bilirubin, total 0.2 05/16/2019 10:43 AM    AST (SGOT) 12 (L) 05/16/2019 10:43 AM    Alk. phosphatase 105 05/16/2019 10:43 AM    Protein, total 7.2 05/16/2019 10:43 AM    Albumin 3.6 05/16/2019 10:43 AM    Globulin 3.6 05/16/2019 10:43 AM    A-G Ratio 1.0 05/16/2019 10:43 AM    ALT (SGPT) 24 05/16/2019 10:43 AM     Lab Results   Component Value Date/Time    Cholesterol, total 232 (H) 05/16/2019 10:43 AM    HDL Cholesterol 45 05/16/2019 10:43 AM    LDL, calculated 138.2 (H) 05/16/2019 10:43 AM    VLDL, calculated 48.8 05/16/2019 10:43 AM    Triglyceride 244 (H) 05/16/2019 10:43 AM    CHOL/HDL Ratio 5.2 (H) 05/16/2019 10:43 AM     Lab Results   Component Value Date/Time    TSH 1.55 05/16/2019 10:43 AM     Lab Results   Component Value Date/Time    Hemoglobin A1c 6.2 (H) 05/16/2019 10:43 AM           ROS: Denies any headache, dizziness, no chest pain or trouble breathing, no arm or leg weakness. No nausea or vomiting, no weight or appetite changes, no mood changes . No urine or bowel complains, no palpitation, no diaphoresis. No abdominal pain. No cold or cough. No leg swelling. No fever. Physical Exam   Constitutional: She is oriented to person, place, and time. No distress. Neck: No thyromegaly present. Cardiovascular: Normal rate, regular rhythm and normal heart sounds. Pulmonary/Chest:   CTA   Abdominal: Soft. Bowel sounds are normal. There is no tenderness. Musculoskeletal: She exhibits no edema. Lymphadenopathy:     She has no cervical adenopathy. Neurological: She is oriented to person, place, and time. Psychiatric: Her behavior is normal.       ASSESSMENT and PLAN    ICD-10-CM ICD-9-CM    1. Essential hypertension: well controlled. Continue current dose of medication and low salt diet. Exercise as tolerated. I10 401.9 REFERRAL TO NUTRITION   2. Gastroesophageal reflux disease without esophagitis: stable. Diet modification with avoid spicy food. K21.9 530.81    3. Impaired fasting blood sugar: diet modification, weight loss importance and diet modification discussed. Agree to see dietitian. Referral done. R73.01 790.21 REFERRAL TO NUTRITION   4. Thyroid condition/ not on any medication: lately feeling fatigue. Recent TSH wnl.  E07.9 246.9    5. Dyslipidemia: agree to take statin. See HPI.  E78.5 272.4 REFERRAL TO NUTRITION      atorvastatin (LIPITOR) 10 mg tablet      LIPID PANEL      HEPATIC FUNCTION PANEL   6. Other irritable bowel syndrome: symptomatic treatment. K58.8 564.1 dicyclomine (BENTYL) 20 mg tablet   7. Discomfort of back: for now worsening gradually since last few months. Average pain over lower back and lower ext moderate in intensity. Will send her to spine specialist.  M54.9 724.5 REFERRAL TO SPINE SURGERY    leg pain on and off , radiation of pain from lower back . prior back surgery . multiple level degenerative changes    8. Other fatigue: for now discussed diet modification and exercise. Mild anemia. Will observe. Healthy diet and will f/u next visit. R53.83 780.79    9. BMI 33.0-33.9,adult: discussed high BMI. Discussed diet modification, calorie count and exercise. Discussed importance of weight loss. agree to do exercise and life style modification. Diet and exercise hand out given in AVS.    Z68.33 V85.33    Pt understood and agree with the plan   Review hM   Follow-up and Dispositions    · Return in about 3 months (around 8/20/2019).

## 2019-07-28 DIAGNOSIS — G89.29 OTHER CHRONIC PAIN: ICD-10-CM

## 2019-07-29 RX ORDER — INDOMETHACIN 50 MG/1
CAPSULE ORAL
Qty: 30 CAP | Refills: 0 | Status: SHIPPED | OUTPATIENT
Start: 2019-07-29 | End: 2020-01-08

## 2019-08-15 DIAGNOSIS — F41.9 ANXIETY: ICD-10-CM

## 2019-08-15 DIAGNOSIS — I10 ESSENTIAL HYPERTENSION: ICD-10-CM

## 2019-08-15 DIAGNOSIS — K21.9 GASTROESOPHAGEAL REFLUX DISEASE WITHOUT ESOPHAGITIS: ICD-10-CM

## 2019-08-15 RX ORDER — OMEPRAZOLE 20 MG/1
CAPSULE, DELAYED RELEASE ORAL
Qty: 90 CAP | Refills: 0 | Status: SHIPPED | OUTPATIENT
Start: 2019-08-15 | End: 2019-11-15 | Stop reason: SDUPTHER

## 2019-08-15 RX ORDER — LISINOPRIL 20 MG/1
TABLET ORAL
Qty: 90 TAB | Refills: 0 | Status: SHIPPED | OUTPATIENT
Start: 2019-08-15 | End: 2019-11-26 | Stop reason: SDUPTHER

## 2019-08-15 RX ORDER — SERTRALINE HYDROCHLORIDE 25 MG/1
TABLET, FILM COATED ORAL
Qty: 90 TAB | Refills: 0 | Status: SHIPPED | OUTPATIENT
Start: 2019-08-15 | End: 2019-11-15 | Stop reason: SDUPTHER

## 2019-08-15 RX ORDER — NIFEDIPINE 60 MG/1
TABLET, EXTENDED RELEASE ORAL
Qty: 90 TAB | Refills: 0 | Status: SHIPPED | OUTPATIENT
Start: 2019-08-15 | End: 2019-10-16

## 2019-09-04 ENCOUNTER — OFFICE VISIT (OUTPATIENT)
Dept: CARDIOLOGY CLINIC | Age: 54
End: 2019-09-04

## 2019-09-04 VITALS
HEIGHT: 65 IN | BODY MASS INDEX: 34.55 KG/M2 | WEIGHT: 207.4 LBS | DIASTOLIC BLOOD PRESSURE: 75 MMHG | SYSTOLIC BLOOD PRESSURE: 131 MMHG | HEART RATE: 87 BPM

## 2019-09-04 DIAGNOSIS — R06.02 SOB (SHORTNESS OF BREATH): ICD-10-CM

## 2019-09-04 DIAGNOSIS — R42 DIZZINESS: ICD-10-CM

## 2019-09-04 DIAGNOSIS — R53.83 FATIGUE, UNSPECIFIED TYPE: ICD-10-CM

## 2019-09-04 DIAGNOSIS — Z82.49 FAMILY HISTORY OF PREMATURE CAD: ICD-10-CM

## 2019-09-04 DIAGNOSIS — I10 HYPERTENSION, UNSPECIFIED TYPE: Primary | ICD-10-CM

## 2019-09-04 PROBLEM — F41.9 ANXIETY: Status: ACTIVE | Noted: 2019-09-04

## 2019-09-04 PROBLEM — K21.9 GASTROESOPHAGEAL REFLUX DISEASE WITHOUT ESOPHAGITIS: Status: ACTIVE | Noted: 2019-09-04

## 2019-09-04 RX ORDER — SERTRALINE HYDROCHLORIDE 25 MG/1
TABLET, FILM COATED ORAL DAILY
COMMUNITY
End: 2019-10-16 | Stop reason: SDUPTHER

## 2019-09-04 RX ORDER — NIFEDIPINE 60 MG/1
60 TABLET, EXTENDED RELEASE ORAL DAILY
COMMUNITY
End: 2019-10-16 | Stop reason: SDUPTHER

## 2019-09-04 RX ORDER — DICYCLOMINE HYDROCHLORIDE 20 MG/1
20 TABLET ORAL EVERY 6 HOURS
COMMUNITY
End: 2019-10-16 | Stop reason: SDUPTHER

## 2019-09-04 RX ORDER — OMEPRAZOLE 20 MG/1
20 CAPSULE, DELAYED RELEASE ORAL DAILY
COMMUNITY
End: 2019-10-16 | Stop reason: SDUPTHER

## 2019-09-04 RX ORDER — LISINOPRIL 20 MG/1
TABLET ORAL DAILY
COMMUNITY
End: 2019-10-16 | Stop reason: SDUPTHER

## 2019-09-04 RX ORDER — INDOMETHACIN 50 MG/1
CAPSULE ORAL 3 TIMES DAILY
COMMUNITY
End: 2019-10-16 | Stop reason: SDUPTHER

## 2019-09-04 NOTE — PROGRESS NOTES
HISTORY OF PRESENT ILLNESS  Enrike Bauer is a 48 y.o. female. New Patient   The history is provided by the patient and medical records. This is a recurrent problem. The current episode started more than 1 week ago. The problem occurs every several days. The problem has been gradually worsening. Associated symptoms include shortness of breath. Pertinent negatives include no chest pain. Nothing aggravates the symptoms. Nothing relieves the symptoms. Dizziness   The history is provided by the patient and medical records. This is a recurrent problem. The current episode started more than 1 week ago. The problem occurs every several days. The problem has been gradually worsening. Associated symptoms include shortness of breath. Pertinent negatives include no chest pain. Nothing aggravates the symptoms. Nothing relieves the symptoms. Shortness of Breath   The history is provided by the patient and medical records. This is a recurrent problem. The problem occurs continuously. The current episode started more than 1 week ago. The problem has been gradually worsening. Pertinent negatives include no cough, no wheezing, no PND, no orthopnea, no chest pain, no vomiting, no leg swelling and no claudication.      Family History   Problem Relation Age of Onset    Heart Attack Mother     Coronary Artery Disease Mother     Heart Attack Father     Coronary Artery Disease Brother     Cancer Maternal Grandmother     Heart Attack Paternal Grandfather        Past Medical History:   Diagnosis Date    Diabetes ([address]     Pre diabetes    Essential hypertension     Hyperlipidemia        Past Surgical History:   Procedure Laterality Date    HX CHOLECYSTECTOMY  2003    HX PARTIAL HYSTERECTOMY         Social History     Tobacco Use    Smoking status: Never Smoker    Smokeless tobacco: Never Used   Substance Use Topics    Alcohol use: Not Currently       Allergies   Allergen Reactions    Morphine Other (comments)     \"chest burning\"       Prior to Admission medications    Medication Sig Start Date End Date Taking? Authorizing Provider   NIFEdipine ER (ADALAT CC) 60 mg ER tablet Take 60 mg by mouth daily. Yes Provider, Historical   lisinopril (PRINIVIL, ZESTRIL) 20 mg tablet Take  by mouth daily. Yes Provider, Historical   omeprazole (PRILOSEC) 20 mg capsule Take 20 mg by mouth daily. Yes Provider, Historical   sertraline (ZOLOFT) 25 mg tablet Take  by mouth daily. Yes Provider, Historical   dicyclomine (BENTYL) 20 mg tablet Take 20 mg by mouth every six (6) hours. Yes Provider, Historical   indomethacin (INDOCIN) 50 mg capsule Take  by mouth three (3) times daily. Yes Provider, Historical         Visit Vitals  /75   Pulse 87   Ht 5' 5\" (1.651 m)   Wt 94.1 kg (207 lb 6.4 oz)   BMI 34.51 kg/m²       Review of Systems   Constitutional: Positive for malaise/fatigue. Respiratory: Positive for shortness of breath. Negative for cough and wheezing. Cardiovascular: Positive for palpitations. Negative for chest pain, orthopnea, claudication, leg swelling and PND. Gastrointestinal: Negative for nausea and vomiting. Musculoskeletal: Negative for falls. Neurological: Positive for dizziness. Endo/Heme/Allergies: Does not bruise/bleed easily. Physical Exam   Constitutional: She is oriented to person, place, and time. She appears well-developed and well-nourished. Neck: No JVD present. Cardiovascular: Normal rate, regular rhythm, normal heart sounds and intact distal pulses. Exam reveals no gallop and no friction rub. No murmur heard. Pulmonary/Chest: Effort normal and breath sounds normal. No respiratory distress. She has no wheezes. She has no rales. She exhibits no tenderness. Abdominal: Soft. She exhibits no distension and no mass. There is no tenderness. Musculoskeletal: Normal range of motion. She exhibits no edema. Neurological: She is alert and oriented to person, place, and time.    Skin: Skin is warm and dry. Psychiatric: She has a normal mood and affect. Nursing note and vitals reviewed. EKG - SR    ASSESSMENT and PLAN    Ms. Cristiano Marte has a reminder for a \"due or due soon\" health maintenance. I have asked that she contact her primary care provider for follow-up on this health maintenance. No flowsheet data found. Assessment         ICD-10-CM ICD-9-CM    1. Hypertension, unspecified type I10 401.9 AMB POC EKG ROUTINE W/ 12 LEADS, INTER & REP    Controlled with current regimen   2. Dizziness R42 780.4 TILT TABLE EVAL W/WO DRUG      ECHO ADULT COMPLETE      EVENT MONITOR POST SYMPTOMS      CT HEART W/O CONT WITH CALCIUM    Tilt table test   3. SOB (shortness of breath) R06.02 786.05 CT HEART W/O CONT WITH CALCIUM    stress test to evaluate for ischemia   4. Fatigue, unspecified type R53.83 780.79     f/u post testing   5. Family history of premature CAD Z82.49 V17.3 NUCLEAR CARDIAC STRESS TEST      CT HEART W/O CONT WITH CALCIUM    CAD     9/2019 Referred for episodes of dizziness with diaphoresis, nausea and SOB. Episodes occur with rest and last 10-30 minutes. She also reports occasional chest pain. She has strong family h/o CAD with mother having MI in 25s. Will obtain non-invasive evaluation due to symptoms and risk factors with stress test, echo, calcium score, event monitor and tilt table. To f/u post testing. There are no discontinued medications. Orders Placed This Encounter    CT HEART W/O CONT WITH CALCIUM     Standing Status:   Future     Standing Expiration Date:   10/4/2020     Order Specific Question:   Is Patient Allergic to Contrast Dye? Answer:   No    AMB POC EKG ROUTINE W/ 12 LEADS, INTER & REP     Order Specific Question:   Reason for Exam:     Answer:   htn    EVENT MONITOR POST SYMPTOMS     Order Specific Question:   Reason for Exam:     Answer:   palpitation       Follow-up and Dispositions    · Return in about 6 weeks (around 10/16/2019).      I have independently evaluated taken history and examined the patient. All relevant labs and testing data's are reviewed. Care plan discussed and updated after review.   Manan Felipe MD

## 2019-09-04 NOTE — PATIENT INSTRUCTIONS

## 2019-09-17 ENCOUNTER — HOSPITAL ENCOUNTER (OUTPATIENT)
Dept: CT IMAGING | Age: 54
Discharge: HOME | End: 2019-09-17
Attending: NURSE PRACTITIONER
Payer: SELF-PAY

## 2019-09-17 DIAGNOSIS — Z82.49 FAMILY HISTORY OF PREMATURE CAD: ICD-10-CM

## 2019-09-17 DIAGNOSIS — R42 DIZZINESS: ICD-10-CM

## 2019-09-17 DIAGNOSIS — R06.02 SOB (SHORTNESS OF BREATH): ICD-10-CM

## 2019-09-17 PROCEDURE — 75571 CT HRT W/O DYE W/CA TEST: CPT

## 2019-09-20 ENCOUNTER — HOSPITAL ENCOUNTER (OUTPATIENT)
Dept: CARDIAC CATH/INVASIVE PROCEDURES | Age: 54
Discharge: HOME | End: 2019-09-20
Attending: INTERNAL MEDICINE | Admitting: INTERNAL MEDICINE
Payer: COMMERCIAL

## 2019-09-20 VITALS
SYSTOLIC BLOOD PRESSURE: 135 MMHG | OXYGEN SATURATION: 96 % | RESPIRATION RATE: 22 BRPM | DIASTOLIC BLOOD PRESSURE: 85 MMHG | WEIGHT: 207 LBS | HEART RATE: 84 BPM | BODY MASS INDEX: 34.45 KG/M2

## 2019-09-20 DIAGNOSIS — R42 DIZZINESS AND GIDDINESS: ICD-10-CM

## 2019-09-20 PROCEDURE — 93660 TILT TABLE EVALUATION: CPT

## 2019-09-20 PROCEDURE — 74011250636 HC RX REV CODE- 250/636: Performed by: INTERNAL MEDICINE

## 2019-09-20 PROCEDURE — 74011250637 HC RX REV CODE- 250/637

## 2019-09-20 RX ORDER — NITROGLYCERIN 400 UG/1
1 SPRAY ORAL ONCE
Status: COMPLETED | OUTPATIENT
Start: 2019-09-20 | End: 2019-09-20

## 2019-09-20 RX ORDER — SODIUM CHLORIDE 9 MG/ML
25 INJECTION, SOLUTION INTRAVENOUS CONTINUOUS
Status: DISCONTINUED | OUTPATIENT
Start: 2019-09-20 | End: 2019-09-20 | Stop reason: HOSPADM

## 2019-09-20 RX ADMIN — NITROGLYCERIN 1 SPRAY: 400 SPRAY ORAL at 09:25

## 2019-09-20 RX ADMIN — SODIUM CHLORIDE 500 ML: 900 INJECTION, SOLUTION INTRAVENOUS at 08:13

## 2019-09-20 RX ADMIN — SODIUM CHLORIDE 25 ML/HR: 900 INJECTION, SOLUTION INTRAVENOUS at 08:49

## 2019-09-20 RX ADMIN — SODIUM CHLORIDE 250 ML: 900 INJECTION, SOLUTION INTRAVENOUS at 09:29

## 2019-09-20 NOTE — H&P
H&P update    No new symptoms  Risks, benefits and alternatives of tilt table test explained to patient.

## 2019-09-20 NOTE — PROGRESS NOTES
0813: 500 cc bolus started     0849: Bolus tolerated well. No distress noted. Pt tilted.      0853: BP & HR WDL     0902: Pt remains stable. Denies dizziness. No distress noted.      0919: Pt tolerating tilt well. Vitals stable. Administering Nitro spray.      [id number]: Post nitro pt /59 HR 90 SR.     0925: BP 92/54. HR      0928: Pt became diaphoretic, and dizzy. BP 63/36 HR 74 NSR. Pt moving causing artifact on ECG strips.     0929: Pt laid supine. Restarted fluids 250cc NS bolus. Pt stable. BP stabilizing. Will continue to monitor     0941: pt stable. Dizziness gone. BP 93/56. HR 76     0949: Pt stable. Vitals back to baseline.      1013: I have reviewed discharge instructions with the patient. The patient verbalized understanding. PIV x 1 removed. Pt stable. Conclusion:    Positive tilt table test. No significant katty or tachycardia noted, however patient was symptomatic.

## 2019-09-20 NOTE — PROGRESS NOTES
Cath holding summary     Patient escorted to cath holding from waiting area ambulatory, alert and oriented x 4, voicing no complaints. Changed into gown and placed on monitor. NPO since MN. Lab results, med rec and H&P reviewed on chart. PIV x 1 inserted without difficulty.

## 2019-09-20 NOTE — PROGRESS NOTES
0813: 500 cc bolus started    0535: Bolus tolerated well. No distress noted. Pt tilted. 0853: BP & HR WDL    0902: Pt remains stable. Denies dizziness. No distress noted. 8420: Pt tolerating tilt well. Vitals stable. Administering Nitro spray. 3761: Post nitro pt /59 HR 90 SR.    0925: BP 92/54. HR     0928: Pt became diaphoretic, and dizzy. BP 63/36 HR 74 NSR. Pt moving causing artifact on ECG strips.    0929: Pt laid supine. Restarted fluids 250cc NS bolus. Pt stable. BP stabilizing. Will continue to monitor    0941: pt stable. Dizziness gone. BP 93/56. HR 76    0949: Pt stable. Vitals back to baseline. 1013: I have reviewed discharge instructions with the patient. The patient verbalized understanding. PIV x 1 removed. Pt stable.

## 2019-10-16 ENCOUNTER — OFFICE VISIT (OUTPATIENT)
Dept: CARDIOLOGY CLINIC | Age: 54
End: 2019-10-16

## 2019-10-16 VITALS
WEIGHT: 209.6 LBS | DIASTOLIC BLOOD PRESSURE: 74 MMHG | HEART RATE: 89 BPM | HEIGHT: 65 IN | SYSTOLIC BLOOD PRESSURE: 123 MMHG | BODY MASS INDEX: 34.92 KG/M2

## 2019-10-16 DIAGNOSIS — I25.10 ATHEROSCLEROSIS OF NATIVE CORONARY ARTERY OF NATIVE HEART WITHOUT ANGINA PECTORIS: Primary | ICD-10-CM

## 2019-10-16 DIAGNOSIS — E78.2 MIXED HYPERLIPIDEMIA: ICD-10-CM

## 2019-10-16 DIAGNOSIS — R42 DIZZINESS AND GIDDINESS: ICD-10-CM

## 2019-10-16 DIAGNOSIS — Z82.49 FAMILY HISTORY OF PREMATURE CAD: ICD-10-CM

## 2019-10-16 DIAGNOSIS — R94.39 ABNORMAL STRESS TEST: ICD-10-CM

## 2019-10-16 DIAGNOSIS — I10 ESSENTIAL HYPERTENSION: ICD-10-CM

## 2019-10-16 RX ORDER — METOPROLOL SUCCINATE 25 MG/1
25 TABLET, EXTENDED RELEASE ORAL DAILY
Qty: 30 TAB | Refills: 6 | Status: SHIPPED | OUTPATIENT
Start: 2019-10-16 | End: 2020-05-29

## 2019-10-16 RX ORDER — NIFEDIPINE 30 MG/1
30 TABLET, EXTENDED RELEASE ORAL DAILY
Qty: 30 TAB | Refills: 6 | Status: SHIPPED | OUTPATIENT
Start: 2019-10-16 | End: 2020-05-29

## 2019-10-16 RX ORDER — ASPIRIN 81 MG/1
81 TABLET ORAL DAILY
Qty: 90 TAB | Refills: 3 | Status: SHIPPED | OUTPATIENT
Start: 2019-10-16 | End: 2019-10-30

## 2019-10-16 RX ORDER — ATORVASTATIN CALCIUM 10 MG/1
10 TABLET, FILM COATED ORAL DAILY
Qty: 90 TAB | Refills: 2 | Status: SHIPPED | OUTPATIENT
Start: 2019-10-16 | End: 2020-10-05

## 2019-10-16 NOTE — PATIENT INSTRUCTIONS
High Cholesterol: Care Instructions  Your Care Instructions    Cholesterol is a type of fat in your blood. It is needed for many body functions, such as making new cells. Cholesterol is made by your body. It also comes from food you eat. High cholesterol means that you have too much of the fat in your blood. This raises your risk of a heart attack and stroke. LDL and HDL are part of your total cholesterol. LDL is the \"bad\" cholesterol. High LDL can raise your risk for heart disease, heart attack, and stroke. HDL is the \"good\" cholesterol. It helps clear bad cholesterol from the body. High HDL is linked with a lower risk of heart disease, heart attack, and stroke. Your cholesterol levels help your doctor find out your risk for having a heart attack or stroke. You and your doctor can talk about whether you need to lower your risk and what treatment is best for you. A heart-healthy lifestyle along with medicines can help lower your cholesterol and your risk. The way you choose to lower your risk will depend on how high your risk is for heart attack and stroke. It will also depend on how you feel about taking medicines. Follow-up care is a key part of your treatment and safety. Be sure to make and go to all appointments, and call your doctor if you are having problems. It's also a good idea to know your test results and keep a list of the medicines you take. How can you care for yourself at home? · Eat a variety of foods every day. Good choices include fruits, vegetables, whole grains (like oatmeal), dried beans and peas, nuts and seeds, soy products (like tofu), and fat-free or low-fat dairy products. · Replace butter, margarine, and hydrogenated or partially hydrogenated oils with olive and canola oils. (Canola oil margarine without trans fat is fine.)  · Replace red meat with fish, poultry, and soy protein (like tofu). · Limit processed and packaged foods like chips, crackers, and cookies.   · Bake, broil, or steam foods. Don't colunga them. · Be physically active. Get at least 30 minutes of exercise on most days of the week. Walking is a good choice. You also may want to do other activities, such as running, swimming, cycling, or playing tennis or team sports. · Stay at a healthy weight or lose weight by making the changes in eating and physical activity listed above. Losing just a small amount of weight, even 5 to 10 pounds, can reduce your risk for having a heart attack or stroke. · Do not smoke. When should you call for help? Watch closely for changes in your health, and be sure to contact your doctor if:    · You need help making lifestyle changes.     · You have questions about your medicine. Where can you learn more? Go to http://arden-emiliano.info/. Enter V014 in the search box to learn more about \"High Cholesterol: Care Instructions. \"  Current as of: April 9, 2019  Content Version: 12.2  © 0698-2495 [employer]. Care instructions adapted under license by [employer] (which disclaims liability or warranty for this information). If you have questions about a medical condition or this instruction, always ask your healthcare professional. [address] any warranty or liability for your use of this information. Learning About High Cholesterol  What is high cholesterol? Cholesterol is a type of fat in your blood. It is needed for many body functions, such as making new cells. Cholesterol is made by your body. It also comes from food you eat. If you have too much cholesterol, it starts to build up in your arteries. This is called hardening of the arteries, or atherosclerosis. High cholesterol raises your risk of a heart attack and stroke. There are different types of cholesterol. LDL is the \"bad\" cholesterol. High LDL can raise your risk for heart disease, heart attack, and stroke. HDL is the \"good\" cholesterol.  High HDL is linked with a lower risk for heart disease, heart attack, and stroke. Your cholesterol levels help your doctor find out your risk for having a heart attack or stroke. How can you prevent high cholesterol? A heart-healthy lifestyle can help you prevent high cholesterol. This lifestyle helps lower your risk for a heart attack and stroke. · Eat heart-healthy foods. ? Eat fruits, vegetables, whole grains (like oatmeal), dried beans and peas, nuts and seeds, soy products (like tofu), and fat-free or low-fat dairy products. ? Replace butter, margarine, and hydrogenated or partially hydrogenated oils with olive and canola oils. (Canola oil margarine without trans fat is fine.)  ? Replace red meat with fish, poultry, and soy protein (like tofu). ? Limit processed and packaged foods like chips, crackers, and cookies. · Be active. Exercise can improve your cholesterol level. Get at least 30 minutes of exercise on most days of the week. Walking is a good choice. You also may want to do other activities, such as running, swimming, cycling, or playing tennis or team sports. · Stay at a healthy weight. Lose weight if you need to. · Don't smoke. If you need help quitting, talk to your doctor about stop-smoking programs and medicines. These can increase your chances of quitting for good. How is high cholesterol treated? The goal of treatment is to reduce your chances of having a heart attack or stroke. The goal is not to lower your cholesterol numbers only. · You may make lifestyle changes, such as eating healthy foods, not smoking, losing weight, and being more active. · You may have to take medicine. Follow-up care is a key part of your treatment and safety. Be sure to make and go to all appointments, and call your doctor if you are having problems. It's also a good idea to know your test results and keep a list of the medicines you take. Where can you learn more?   Go to http://arden-emiliano.info/. Enter I194 in the search box to learn more about \"Learning About High Cholesterol. \"  Current as of: April 9, 2019  Content Version: 12.2  © 3884-4431 [employer]. Care instructions adapted under license by [employer] (which disclaims liability or warranty for this information). If you have questions about a medical condition or this instruction, always ask your healthcare professional. [place] [address] any warranty or liability for your use of this information.

## 2019-10-16 NOTE — PROGRESS NOTES
HISTORY OF PRESENT ILLNESS  Tez Styles is a 48 y.o. female. 10/2019  Patient is here for follow up of diagnostic tests. Results will be discussed. Dizziness   The history is provided by the patient and medical records. This is a recurrent problem. The current episode started more than 1 week ago. The problem occurs every several days. The problem has been gradually worsening. Nothing aggravates the symptoms. Nothing relieves the symptoms. Shortness of Breath   The history is provided by the patient and medical records. This is a recurrent problem. The problem occurs continuously. The current episode started more than 1 week ago. The problem has been gradually worsening. Pertinent negatives include no cough, no wheezing, no PND, no orthopnea, no vomiting, no leg swelling and no claudication.    Hypertension       Family History   Problem Relation Age of Onset    Heart Attack Mother     Coronary Artery Disease Mother     Heart Attack Father     Coronary Artery Disease Brother     Cancer Maternal Grandmother     Heart Attack Paternal Grandfather        Past Medical History:   Diagnosis Date    Diabetes ([address]     Pre diabetes    Essential hypertension     GERD (gastroesophageal reflux disease)     Glaucoma     Hyperlipidemia     Hypertension     Nausea & vomiting     nausea       Past Surgical History:   Procedure Laterality Date    HX CHOLECYSTECTOMY      HX CHOLECYSTECTOMY  2003    HX DILATION AND CURETTAGE  8/14    hysterectomy    HX HEENT      glaucoma    HX ORTHOPAEDIC      back    HX PARTIAL HYSTERECTOMY         Social History     Tobacco Use    Smoking status: Never Smoker    Smokeless tobacco: Never Used   Substance Use Topics    Alcohol use: Not Currently       Allergies   Allergen Reactions    Codeine Other (comments)    Doxycycline Other (comments)    Morphine Other (comments)     Chest burning    Morphine Other (comments)     \"chest burning\"       Prior to Admission medications    Medication Sig Start Date End Date Taking? Authorizing Provider   NIFEdipine ER (PROCARDIA XL) 30 mg ER tablet Take 1 Tab by mouth daily. 10/16/19  Yes Rajiv Jernigan MD   metoprolol succinate (TOPROL-XL) 25 mg XL tablet Take 1 Tab by mouth daily. 10/16/19  Yes Rajiv Jernigan MD   aspirin delayed-release 81 mg tablet Take 1 Tab by mouth daily. 10/16/19  Yes Irvin Kc NP   atorvastatin (LIPITOR) 10 mg tablet Take 1 Tab by mouth daily. 10/16/19  Yes Terrie Kc NP   sertraline (ZOLOFT) 25 mg tablet take 1 tablet by mouth once daily 8/15/19  Yes Linda Villanueva MD   lisinopril (PRINIVIL, ZESTRIL) 20 mg tablet take 1 tablet by mouth once daily 8/15/19  Yes Linda Villanueva MD   omeprazole (PRILOSEC) 20 mg capsule take 1 capsule by mouth once daily 8/15/19  Yes Linda Villanueva MD   indomethacin (INDOCIN) 50 mg capsule take 1 capsule by mouth twice a day if needed 7/29/19  Yes Linda Villanueva MD   Cetirizine (ZYRTEC) 10 mg cap Take 10 mg by mouth daily. 5/20/19  Yes Linda Villanueva MD   dicyclomine (BENTYL) 20 mg tablet Take 1 Tab by mouth every six (6) hours as needed (abdominal cramps). 5/20/19  Yes Linda Villanueva MD         Visit Vitals  /74   Pulse 89   Ht 5' 5\" (1.651 m)   Wt 95.1 kg (209 lb 9.6 oz)   LMP 01/28/2015   BMI 34.88 kg/m²     Echo 9/2019  Interpretation Summary        · Left Ventricle: Normal cavity size, systolic function (ejection fraction normal) and diastolic function. Mild concentric hypertrophy. Estimated left ventricular ejection fraction is 56 - 60%. No regional wall motion abnormality noted. · Right Ventricle: Normal right ventricular size and function. · Mitral Valve: Trace mitral valve regurgitation. · Tricuspid Valve: Mild tricuspid valve regurgitation is present. · Pulmonary Artery: There is no evidence of pulmonary hypertension. 10/2019  Nuclear Stress Test     Abnormal myocardial perfusion imaging.  Myocardial perfusion imaging supports an intermediate risk stress test.   There is no prior study available for comparison. . Moderate intensity ,partially reversible anterior wal defect,possible artifact although can not rule out ischemia and disease of LAD. Calcium score 5    Review of Systems   Constitutional: Positive for malaise/fatigue. Respiratory: Negative for cough and wheezing. Cardiovascular: Positive for palpitations. Negative for orthopnea, claudication, leg swelling and PND. Gastrointestinal: Negative for nausea and vomiting. Musculoskeletal: Negative for falls. Neurological: Positive for dizziness. Endo/Heme/Allergies: Does not bruise/bleed easily. Physical Exam   Constitutional: She is oriented to person, place, and time. She appears well-developed and well-nourished. Neck: No JVD present. Cardiovascular: Normal rate, regular rhythm, normal heart sounds and intact distal pulses. Exam reveals no gallop and no friction rub. No murmur heard. Pulmonary/Chest: Effort normal and breath sounds normal. No respiratory distress. She has no wheezes. She has no rales. She exhibits no tenderness. Abdominal: Soft. She exhibits no distension and no mass. There is no tenderness. Musculoskeletal: Normal range of motion. She exhibits no edema. Neurological: She is alert and oriented to person, place, and time. Skin: Skin is warm and dry. Psychiatric: She has a normal mood and affect. Nursing note and vitals reviewed. EKG - SR    ASSESSMENT and PLAN    Ms. Lisa Kumar has a reminder for a \"due or due soon\" health maintenance. I have asked that she contact her primary care provider for follow-up on this health maintenance. No flowsheet data found. Assessment         ICD-10-CM ICD-9-CM    1.  Atherosclerosis of native coronary artery of native heart without angina pectoris I25.10 414.01 CASE REQUEST CATH LAB      aspirin delayed-release 81 mg tablet      atorvastatin (LIPITOR) 10 mg tablet      CBC W/O DIFF METABOLIC PANEL, BASIC      PROTHROMBIN TIME + INR      XR CHEST PA LAT    Abnormal nuclear stress test with chest pain and abnormality on calcium score which is minimal.   2. Dizziness and giddiness R42 780.4     Positive tilt table test with vasodepressor response. Continue hydration reduce nifedipine at Toprol   3. Essential hypertension I10 401.9 NIFEdipine ER (PROCARDIA XL) 30 mg ER tablet    Stable medications adjusted   4. Mixed hyperlipidemia E78.2 272.2 atorvastatin (LIPITOR) 10 mg tablet    Advise starting Lipitor as LDL is high   5. Family history of premature CAD Z82.49 V17.3    6. Abnormal stress test R94.39 794.39     Proceed with cardiac catheterization     9/2019 Referred for episodes of dizziness with diaphoresis, nausea and SOB. Episodes occur with rest and last 10-30 minutes. She also reports occasional chest pain. She has strong family h/o CAD with mother having MI in 25s. Will obtain non-invasive evaluation due to symptoms and risk factors with stress test, echo, calcium score, event monitor and tilt table. To f/u post testing. 10/2019  Abnormal stress test with atypical chest pain strong family history and minimal calcium abnormality on CAT scan. Will proceed with cardiac catheterization for further evaluation. Decreased nifedipine due to positive tilt table test with vasodepressor response. Add low-dose Toprol. Hydration and stockings. THE PATIENT UNDERSTANDS THAT ALTHOUGH RARE, SEVERE  UNEXPECTED COMPLICATIONS CAN OCCUR WITH EACH TYPE OF CARDIAC CATH PROCEDURE.   THESE RISKS INCLUDE,  BUT ARE NOT LIMITED TO: ALLERGIC REACTION, INFECTION, BLEEDING, BLOOD VESSEL INJURY,   KIDNEY INJURY FROM X-RAY DYE, PUNCTURE OF THE HEART/LUNGS,   EMERGENT OPEN HEART SURGERY, HEART ATTACK, STROKE, CARDIAC  ARREST OR DEATH OR NEED FOR EMERGENCY CARDIAC BYPASS SURGERY       Medications Discontinued During This Encounter   Medication Reason    dicyclomine (BENTYL) 20 mg tablet Duplicate Order   Kleber Her indomethacin (INDOCIN) 50 mg capsule Duplicate Order    lisinopril (PRINIVIL, ZESTRIL) 20 mg tablet Duplicate Order    NIFEdipine ER (ADALAT CC) 60 mg ER tablet Duplicate Order    omeprazole (PRILOSEC) 20 mg capsule Duplicate Order    sertraline (ZOLOFT) 25 mg tablet Duplicate Order    NIFEdipine ER (PROCARDIA XL) 60 mg ER tablet     atorvastatin (LIPITOR) 10 mg tablet Reorder       Orders Placed This Encounter    XR CHEST PA LAT     Standing Status:   Future     Standing Expiration Date:   11/16/2020     Order Specific Question:   Reason for Exam     Answer:   pre cath    CBC W/O DIFF     Standing Status:   Future     Standing Expiration Date:   25/39/9572    METABOLIC PANEL, BASIC     Standing Status:   Future     Standing Expiration Date:   10/16/2020    PROTHROMBIN TIME + INR     Standing Status:   Future     Standing Expiration Date:   10/16/2020    NIFEdipine ER (PROCARDIA XL) 30 mg ER tablet     Sig: Take 1 Tab by mouth daily. Dispense:  30 Tab     Refill:  6    metoprolol succinate (TOPROL-XL) 25 mg XL tablet     Sig: Take 1 Tab by mouth daily. Dispense:  30 Tab     Refill:  6    aspirin delayed-release 81 mg tablet     Sig: Take 1 Tab by mouth daily. Dispense:  90 Tab     Refill:  3    atorvastatin (LIPITOR) 10 mg tablet     Sig: Take 1 Tab by mouth daily. Dispense:  90 Tab     Refill:  2       Follow-up and Dispositions    · Return in about 6 weeks (around 11/27/2019), or if symptoms worsen or fail to improve. I have independently evaluated taken history and examined the patient. All relevant labs and testing data's are reviewed. Care plan discussed and updated after review.   Tarik Frias MD

## 2019-10-16 NOTE — H&P (VIEW-ONLY)
HISTORY OF PRESENT ILLNESS Negin Alves is a 48 y.o. female. 10/2019 Patient is here for follow up of diagnostic tests. Results will be discussed. Dizziness The history is provided by the patient and medical records. This is a recurrent problem. The current episode started more than 1 week ago. The problem occurs every several days. The problem has been gradually worsening. Nothing aggravates the symptoms. Nothing relieves the symptoms. Shortness of Breath The history is provided by the patient and medical records. This is a recurrent problem. The problem occurs continuously. The current episode started more than 1 week ago. The problem has been gradually worsening. Pertinent negatives include no cough, no wheezing, no PND, no orthopnea, no vomiting, no leg swelling and no claudication. Hypertension Family History Problem Relation Age of Onset  Heart Attack Mother  Coronary Artery Disease Mother  Heart Attack Father  Coronary Artery Disease Brother  Cancer Maternal Grandmother  Heart Attack Paternal Grandfather Past Medical History:  
Diagnosis Date  Diabetes ([address] Pre diabetes  Essential hypertension  GERD (gastroesophageal reflux disease)  Glaucoma  Hyperlipidemia  Hypertension  Nausea & vomiting   
 nausea Past Surgical History:  
Procedure Laterality Date  HX CHOLECYSTECTOMY  HX CHOLECYSTECTOMY  2003  HX DILATION AND CURETTAGE  8/14 hysterectomy  HX HEENT    
 glaucoma  HX ORTHOPAEDIC    
 back  HX PARTIAL HYSTERECTOMY Social History Tobacco Use  Smoking status: Never Smoker  Smokeless tobacco: Never Used Substance Use Topics  Alcohol use: Not Currently Allergies Allergen Reactions  Codeine Other (comments)  Doxycycline Other (comments)  Morphine Other (comments) Chest burning  Morphine Other (comments) \"chest burning\" Prior to Admission medications Medication Sig Start Date End Date Taking? Authorizing Provider NIFEdipine ER (PROCARDIA XL) 30 mg ER tablet Take 1 Tab by mouth daily. 10/16/19  Yes Giacomo Carson MD  
metoprolol succinate (TOPROL-XL) 25 mg XL tablet Take 1 Tab by mouth daily. 10/16/19  Yes Giacomo Carson MD  
aspirin delayed-release 81 mg tablet Take 1 Tab by mouth daily. 10/16/19  Yes Irvin Kc NP  
atorvastatin (LIPITOR) 10 mg tablet Take 1 Tab by mouth daily. 10/16/19  Yes Cathleen Kc NP  
sertraline (ZOLOFT) 25 mg tablet take 1 tablet by mouth once daily 8/15/19  Yes Oscar Peters MD  
lisinopril (PRINIVIL, ZESTRIL) 20 mg tablet take 1 tablet by mouth once daily 8/15/19  Yes Oscar Peters MD  
omeprazole (PRILOSEC) 20 mg capsule take 1 capsule by mouth once daily 8/15/19  Yes Oscar Peters MD  
indomethacin (INDOCIN) 50 mg capsule take 1 capsule by mouth twice a day if needed 7/29/19  Yes Oscar Peters MD  
Cetirizine (ZYRTEC) 10 mg cap Take 10 mg by mouth daily. 5/20/19  Yes Oscar Peters MD  
dicyclomine (BENTYL) 20 mg tablet Take 1 Tab by mouth every six (6) hours as needed (abdominal cramps). 5/20/19  Yes Oscar Peters MD  
 
 
 
Visit Vitals /74 Pulse 89 Ht 5' 5\" (1.651 m) Wt 95.1 kg (209 lb 9.6 oz) LMP 01/28/2015 BMI 34.88 kg/m² Echo 9/2019 Interpretation Summary  
 
  
· Left Ventricle: Normal cavity size, systolic function (ejection fraction normal) and diastolic function. Mild concentric hypertrophy. Estimated left ventricular ejection fraction is 56 - 60%. No regional wall motion abnormality noted. · Right Ventricle: Normal right ventricular size and function. · Mitral Valve: Trace mitral valve regurgitation. · Tricuspid Valve: Mild tricuspid valve regurgitation is present. · Pulmonary Artery: There is no evidence of pulmonary hypertension. 10/2019 Nuclear Stress Test  
 
 Abnormal myocardial perfusion imaging. Myocardial perfusion imaging supports an intermediate risk stress test.  
There is no prior study available for comparison. . Moderate intensity ,partially reversible anterior wal defect,possible artifact although can not rule out ischemia and disease of LAD. Calcium score 5 Review of Systems Constitutional: Positive for malaise/fatigue. Respiratory: Negative for cough and wheezing. Cardiovascular: Positive for palpitations. Negative for orthopnea, claudication, leg swelling and PND. Gastrointestinal: Negative for nausea and vomiting. Musculoskeletal: Negative for falls. Neurological: Positive for dizziness. Endo/Heme/Allergies: Does not bruise/bleed easily. Physical Exam  
Constitutional: She is oriented to person, place, and time. She appears well-developed and well-nourished. Neck: No JVD present. Cardiovascular: Normal rate, regular rhythm, normal heart sounds and intact distal pulses. Exam reveals no gallop and no friction rub. No murmur heard. Pulmonary/Chest: Effort normal and breath sounds normal. No respiratory distress. She has no wheezes. She has no rales. She exhibits no tenderness. Abdominal: Soft. She exhibits no distension and no mass. There is no tenderness. Musculoskeletal: Normal range of motion. She exhibits no edema. Neurological: She is alert and oriented to person, place, and time. Skin: Skin is warm and dry. Psychiatric: She has a normal mood and affect. Nursing note and vitals reviewed. EKG - SR 
 
ASSESSMENT and PLAN Ms. Radha Nowak has a reminder for a \"due or due soon\" health maintenance. I have asked that she contact her primary care provider for follow-up on this health maintenance. No flowsheet data found. Assessment ICD-10-CM ICD-9-CM 1.  Atherosclerosis of native coronary artery of native heart without angina pectoris I25.10 414.01 CASE REQUEST CATH LAB  
 aspirin delayed-release 81 mg tablet  
   atorvastatin (LIPITOR) 10 mg tablet CBC W/O DIFF  
   METABOLIC PANEL, BASIC  
   PROTHROMBIN TIME + INR  
   XR CHEST PA LAT Abnormal nuclear stress test with chest pain and abnormality on calcium score which is minimal.  
2. Dizziness and giddiness R42 780.4 Positive tilt table test with vasodepressor response. Continue hydration reduce nifedipine at Toprol 3. Essential hypertension I10 401.9 NIFEdipine ER (PROCARDIA XL) 30 mg ER tablet Stable medications adjusted 4. Mixed hyperlipidemia E78.2 272.2 atorvastatin (LIPITOR) 10 mg tablet Advise starting Lipitor as LDL is high 5. Family history of premature CAD Z82.49 V17.3 6. Abnormal stress test R94.39 794.39 Proceed with cardiac catheterization 9/2019 Referred for episodes of dizziness with diaphoresis, nausea and SOB. Episodes occur with rest and last 10-30 minutes. She also reports occasional chest pain. She has strong family h/o CAD with mother having MI in 25s. Will obtain non-invasive evaluation due to symptoms and risk factors with stress test, echo, calcium score, event monitor and tilt table. To f/u post testing. 10/2019 Abnormal stress test with atypical chest pain strong family history and minimal calcium abnormality on CAT scan. Will proceed with cardiac catheterization for further evaluation. Decreased nifedipine due to positive tilt table test with vasodepressor response. Add low-dose Toprol. Hydration and stockings. THE PATIENT UNDERSTANDS THAT ALTHOUGH RARE, SEVERE UNEXPECTED COMPLICATIONS CAN OCCUR WITH EACH TYPE OF CARDIAC CATH PROCEDURE. THESE RISKS INCLUDE,  BUT ARE NOT LIMITED TO: ALLERGIC REACTION, INFECTION, BLEEDING, BLOOD VESSEL INJURY, KIDNEY INJURY FROM X-RAY DYE, PUNCTURE OF THE HEART/LUNGS,  
EMERGENT OPEN HEART SURGERY, HEART ATTACK, STROKE, CARDIAC ARREST OR DEATH OR NEED FOR EMERGENCY CARDIAC BYPASS SURGERY Medications Discontinued During This Encounter Medication Reason  dicyclomine (BENTYL) 20 mg tablet Duplicate Order  indomethacin (INDOCIN) 50 mg capsule Duplicate Order  lisinopril (PRINIVIL, ZESTRIL) 20 mg tablet Duplicate Order  NIFEdipine ER (ADALAT CC) 60 mg ER tablet Duplicate Order  omeprazole (PRILOSEC) 20 mg capsule Duplicate Order  sertraline (ZOLOFT) 25 mg tablet Duplicate Order  NIFEdipine ER (PROCARDIA XL) 60 mg ER tablet  atorvastatin (LIPITOR) 10 mg tablet Reorder Orders Placed This Encounter  XR CHEST PA LAT Standing Status:   Future Standing Expiration Date:   11/16/2020 Order Specific Question:   Reason for Exam  
  Answer:   pre cath  CBC W/O DIFF Standing Status:   Future Standing Expiration Date:   10/16/2020  METABOLIC PANEL, BASIC Standing Status:   Future Standing Expiration Date:   10/16/2020  
 PROTHROMBIN TIME + INR Standing Status:   Future Standing Expiration Date:   10/16/2020  
 NIFEdipine ER (PROCARDIA XL) 30 mg ER tablet Sig: Take 1 Tab by mouth daily. Dispense:  30 Tab Refill:  6  
 metoprolol succinate (TOPROL-XL) 25 mg XL tablet Sig: Take 1 Tab by mouth daily. Dispense:  30 Tab Refill:  6  
 aspirin delayed-release 81 mg tablet Sig: Take 1 Tab by mouth daily. Dispense:  90 Tab Refill:  3  
 atorvastatin (LIPITOR) 10 mg tablet Sig: Take 1 Tab by mouth daily. Dispense:  90 Tab Refill:  2 Follow-up and Dispositions · Return in about 6 weeks (around 11/27/2019), or if symptoms worsen or fail to improve. I have independently evaluated taken history and examined the patient. All relevant labs and testing data's are reviewed. Care plan discussed and updated after review.  
Lc Johnson MD

## 2019-10-16 NOTE — PROGRESS NOTES
1. Have you been to the ER, urgent care clinic since your last visit? Hospitalized since your last visit? No    2. Have you seen or consulted any other health care providers outside of the [address] [place] [address] since your last visit? Include any pap smears or colon screening.  No

## 2019-10-25 ENCOUNTER — HOSPITAL ENCOUNTER (OUTPATIENT)
Dept: GENERAL RADIOLOGY | Age: 54
Discharge: HOME | End: 2019-10-25
Payer: COMMERCIAL

## 2019-10-25 ENCOUNTER — HOSPITAL ENCOUNTER (OUTPATIENT)
Dept: PREADMISSION TESTING | Age: 54
Discharge: HOME | End: 2019-10-25
Payer: COMMERCIAL

## 2019-10-25 DIAGNOSIS — I25.10 ATHEROSCLEROSIS OF NATIVE CORONARY ARTERY OF NATIVE HEART WITHOUT ANGINA PECTORIS: ICD-10-CM

## 2019-10-25 LAB
ANION GAP SERPL CALC-SCNC: 4 MMOL/L (ref 3–18)
BUN SERPL-MCNC: 14 MG/DL (ref 7–18)
BUN/CREAT SERPL: 20 (ref 12–20)
CALCIUM SERPL-MCNC: 9.1 MG/DL (ref 8.5–10.1)
CHLORIDE SERPL-SCNC: 108 MMOL/L (ref 100–111)
CO2 SERPL-SCNC: 29 MMOL/L (ref 21–32)
CREAT SERPL-MCNC: 0.71 MG/DL (ref 0.6–1.3)
ERYTHROCYTE [DISTWIDTH] IN BLOOD BY AUTOMATED COUNT: 14.4 % (ref 11.6–14.5)
GLUCOSE SERPL-MCNC: 109 MG/DL (ref 74–99)
HCT VFR BLD AUTO: 42.6 % (ref 35–45)
HGB BLD-MCNC: 13.5 G/DL (ref 12–16)
INR PPP: 0.9 (ref 0.8–1.2)
MCH RBC QN AUTO: 28.2 PG (ref 24–34)
MCHC RBC AUTO-ENTMCNC: 31.7 G/DL (ref 31–37)
MCV RBC AUTO: 88.9 FL (ref 74–97)
PLATELET # BLD AUTO: 292 K/UL (ref 135–420)
PMV BLD AUTO: 11.4 FL (ref 9.2–11.8)
POTASSIUM SERPL-SCNC: 4.7 MMOL/L (ref 3.5–5.5)
PROTHROMBIN TIME: 11.4 SEC (ref 11.5–15.2)
RBC # BLD AUTO: 4.79 M/UL (ref 4.2–5.3)
SODIUM SERPL-SCNC: 141 MMOL/L (ref 136–145)
WBC # BLD AUTO: 8.2 K/UL (ref 4.6–13.2)

## 2019-10-25 PROCEDURE — 36415 COLL VENOUS BLD VENIPUNCTURE: CPT

## 2019-10-25 PROCEDURE — 71046 X-RAY EXAM CHEST 2 VIEWS: CPT

## 2019-10-25 PROCEDURE — 85027 COMPLETE CBC AUTOMATED: CPT

## 2019-10-25 PROCEDURE — 85610 PROTHROMBIN TIME: CPT

## 2019-10-25 PROCEDURE — 80048 BASIC METABOLIC PNL TOTAL CA: CPT

## 2019-10-30 ENCOUNTER — HOSPITAL ENCOUNTER (OUTPATIENT)
Age: 54
Setting detail: OUTPATIENT SURGERY
Discharge: HOME | End: 2019-10-30
Attending: INTERNAL MEDICINE | Admitting: INTERNAL MEDICINE
Payer: COMMERCIAL

## 2019-10-30 VITALS
WEIGHT: 205 LBS | TEMPERATURE: 98.4 F | DIASTOLIC BLOOD PRESSURE: 61 MMHG | SYSTOLIC BLOOD PRESSURE: 120 MMHG | OXYGEN SATURATION: 99 % | RESPIRATION RATE: 13 BRPM | BODY MASS INDEX: 32.95 KG/M2 | HEART RATE: 66 BPM | HEIGHT: 66 IN

## 2019-10-30 DIAGNOSIS — I25.10 CAD (CORONARY ATHEROSCLEROTIC DISEASE): ICD-10-CM

## 2019-10-30 PROBLEM — R94.39 ABNORMAL NUCLEAR STRESS TEST: Status: ACTIVE | Noted: 2019-10-30

## 2019-10-30 PROBLEM — I20.8 ATYPICAL ANGINA (HCC): Status: ACTIVE | Noted: 2019-10-30

## 2019-10-30 PROCEDURE — 74011636320 HC RX REV CODE- 636/320: Performed by: INTERNAL MEDICINE

## 2019-10-30 PROCEDURE — 74011250637 HC RX REV CODE- 250/637: Performed by: INTERNAL MEDICINE

## 2019-10-30 PROCEDURE — 74011250636 HC RX REV CODE- 250/636: Performed by: INTERNAL MEDICINE

## 2019-10-30 PROCEDURE — 99152 MOD SED SAME PHYS/QHP 5/>YRS: CPT | Performed by: INTERNAL MEDICINE

## 2019-10-30 PROCEDURE — 77030013797 HC KT TRNSDUC PRSSR EDWD -A: Performed by: INTERNAL MEDICINE

## 2019-10-30 PROCEDURE — 77030016699 HC CATH ANGI DX INFN1 CARD -A: Performed by: INTERNAL MEDICINE

## 2019-10-30 PROCEDURE — 99153 MOD SED SAME PHYS/QHP EA: CPT | Performed by: INTERNAL MEDICINE

## 2019-10-30 PROCEDURE — 74011000250 HC RX REV CODE- 250: Performed by: INTERNAL MEDICINE

## 2019-10-30 PROCEDURE — 77030013744: Performed by: INTERNAL MEDICINE

## 2019-10-30 PROCEDURE — 77030027845 HC BND COM RDL D-STAT TELE -B: Performed by: INTERNAL MEDICINE

## 2019-10-30 PROCEDURE — 77030015766: Performed by: INTERNAL MEDICINE

## 2019-10-30 PROCEDURE — 93454 CORONARY ARTERY ANGIO S&I: CPT | Performed by: INTERNAL MEDICINE

## 2019-10-30 PROCEDURE — C1894 INTRO/SHEATH, NON-LASER: HCPCS | Performed by: INTERNAL MEDICINE

## 2019-10-30 RX ORDER — VERAPAMIL HYDROCHLORIDE 2.5 MG/ML
INJECTION, SOLUTION INTRAVENOUS AS NEEDED
Status: DISCONTINUED | OUTPATIENT
Start: 2019-10-30 | End: 2019-10-30 | Stop reason: HOSPADM

## 2019-10-30 RX ORDER — NITROGLYCERIN 0.4 MG/1
0.4 TABLET SUBLINGUAL
Status: DISCONTINUED | OUTPATIENT
Start: 2019-10-30 | End: 2019-10-30 | Stop reason: HOSPADM

## 2019-10-30 RX ORDER — LIDOCAINE HYDROCHLORIDE 10 MG/ML
INJECTION, SOLUTION EPIDURAL; INFILTRATION; INTRACAUDAL; PERINEURAL AS NEEDED
Status: DISCONTINUED | OUTPATIENT
Start: 2019-10-30 | End: 2019-10-30 | Stop reason: HOSPADM

## 2019-10-30 RX ORDER — SODIUM CHLORIDE 9 MG/ML
125 INJECTION, SOLUTION INTRAVENOUS CONTINUOUS
Status: DISCONTINUED | OUTPATIENT
Start: 2019-10-30 | End: 2019-10-30 | Stop reason: HOSPADM

## 2019-10-30 RX ORDER — ACETAMINOPHEN 325 MG/1
650 TABLET ORAL
Status: DISCONTINUED | OUTPATIENT
Start: 2019-10-30 | End: 2019-10-30 | Stop reason: HOSPADM

## 2019-10-30 RX ORDER — HYDRALAZINE HYDROCHLORIDE 20 MG/ML
10 INJECTION INTRAMUSCULAR; INTRAVENOUS
Status: DISCONTINUED | OUTPATIENT
Start: 2019-10-30 | End: 2019-10-30 | Stop reason: HOSPADM

## 2019-10-30 RX ORDER — GUAIFENESIN 100 MG/5ML
81 LIQUID (ML) ORAL
Status: COMPLETED | OUTPATIENT
Start: 2019-10-30 | End: 2019-10-30

## 2019-10-30 RX ORDER — GUAIFENESIN 100 MG/5ML
81 LIQUID (ML) ORAL
Status: DISCONTINUED | OUTPATIENT
Start: 2019-10-30 | End: 2019-10-30

## 2019-10-30 RX ORDER — SODIUM CHLORIDE 0.9 % (FLUSH) 0.9 %
5-40 SYRINGE (ML) INJECTION EVERY 8 HOURS
Status: DISCONTINUED | OUTPATIENT
Start: 2019-10-30 | End: 2019-10-30 | Stop reason: HOSPADM

## 2019-10-30 RX ORDER — MIDAZOLAM HYDROCHLORIDE 1 MG/ML
INJECTION, SOLUTION INTRAMUSCULAR; INTRAVENOUS AS NEEDED
Status: DISCONTINUED | OUTPATIENT
Start: 2019-10-30 | End: 2019-10-30 | Stop reason: HOSPADM

## 2019-10-30 RX ORDER — FENTANYL CITRATE 50 UG/ML
INJECTION, SOLUTION INTRAMUSCULAR; INTRAVENOUS AS NEEDED
Status: DISCONTINUED | OUTPATIENT
Start: 2019-10-30 | End: 2019-10-30 | Stop reason: HOSPADM

## 2019-10-30 RX ORDER — SODIUM CHLORIDE 0.9 % (FLUSH) 0.9 %
5-40 SYRINGE (ML) INJECTION AS NEEDED
Status: DISCONTINUED | OUTPATIENT
Start: 2019-10-30 | End: 2019-10-30 | Stop reason: HOSPADM

## 2019-10-30 RX ORDER — NITROGLYCERIN 40 MG/100ML
INJECTION INTRAVENOUS
Status: DISCONTINUED | OUTPATIENT
Start: 2019-10-30 | End: 2019-10-30 | Stop reason: HOSPADM

## 2019-10-30 RX ADMIN — ASPIRIN 81 MG 81 MG: 81 TABLET ORAL at 09:22

## 2019-10-30 NOTE — INTERVAL H&P NOTE
H&P Update: 
Layton hSah was seen and examined. History and physical has been reviewed. The patient has been examined. There have been no significant clinical changes since the completion of the originally dated History and Physical. 
Discussed with patient and family again, the procedure. All questions answered. Patient is willing to proceed. The benefits and risks of cardiac catheterization have been discussed in detail with the patient and family present at the time. Patient understands  risk of potential cath complications including but not limited to bleeding, infection, difficulty healing the arteriotomy access site(2 chances in 100) which may require surgical repair, potential thromboembolic complications which could result in stroke, myocardial infarction, vascular injury, loss of limb or organ function and/or death( 1 chance in 1000)and potential allergic reaction to contrast dye or other medication used during the procedure. Patient is also aware of the therapeutic implications for medical management vs coronary artery bypass surgery vs percutaneous coronary intervention in treatment of coronary artery disease. The additional risks for percutaneous coronary artery intervention include the need for emergent bypass surgery at 1 chance in 100 and for restenosis which may range from 35% for plain balloon angioplasty, 15% for bare metal stent, and 5% for drug eluting stent implants. The need for mandatory uninterrupted dual antiplatelet therapy with lifelong Aspirin combined with Plavix for up to 12 months following drug eluting stents, and minimum 1 month following bare metal stents to prevent stent thrombosis which is the equivalent of acute heart attack has been reviewed in detail. No contraindications to use of drug eluting stents has been discovered.

## 2019-10-30 NOTE — Clinical Note
TRANSFER - OUT REPORT:  
 
[facility] . Report consisted of patient's Situation, Background, Assessment and  
Recommendations(SBAR). Opportunity for questions and clarification was provided. Patient transported to: [address]

## 2019-10-30 NOTE — PROGRESS NOTES
10/30/19 0932   Vital Signs   Temp 98.4 °F (36.9 °C)   Pulse (Heart Rate) 82   Heart Rate Source Monitor   Cardiac Rhythm NSR   Resp Rate 12   O2 Sat (%) 96 %   Level of Consciousness Alert   /77   MAP (Calculated) 94   BP 1 Method Automatic   BP 1 Location Left arm   BP Patient Position At rest   MEWS Score 0     Pt brought to [facility] ambulatory. Pt placed in bed 3 and connected to monitor. Baseline VS taken and PIV started x 2. Admission database completed. Pt ready for ordered procedure.

## 2019-10-30 NOTE — Clinical Note
Bilateral groin and right brachial clipped, prepped with ChloraPrep and draped. Wet prep solution dried at: 3.

## 2019-10-30 NOTE — PROGRESS NOTES
SHEATH PULL NOTE:    Patient informed of procedure and questions answered with review. 4Fr in left groin pulled at 1110 am by [employer]. Hand hold and good hemostasis, with manual compression to site. Handhold for 20 minutes. Gauze and transparent dressing applied to site. No bleeding, no hematoma, no pain/discomfort at site. Groin instructions provided with review. Patient verbalized understanding.

## 2019-10-30 NOTE — Clinical Note
TRANSFER - IN REPORT:  
 
Verbal report received from: [facility] RN Nelli.  
 
Report consisted of patient's Situation, Background, Assessment and  
Recommendations(SBAR). Opportunity for questions and clarification was provided. Assessment completed upon patient's arrival to unit and care assumed. Patient transported with a Cardiac Cath Tech / Patient Care Tech.

## 2019-10-30 NOTE — DISCHARGE INSTRUCTIONS
HEART CATHETERIZATION/ANGIOGRAPHY DISCHARGE INSTRUCTIONS    1. Check puncture site frequently for swelling or bleeding. If there is any bleeding, lie down and apply pressure over the area with a clean towel or washcloth. Notify your doctor for any redness, swelling, drainage, or oozing from the puncture site. Notify your doctor for any fever or chills. 2. If the extremity becomes cold, numb, or painful call  at [phone].  3. Activity should be limited for the next 48 hours. Climb stairs as little as possible and avoid any stooping, bending, or strenuous activity for 48 hours. No heavy lifting (anything over 10 pounds) for 3 days. 4. You may resume your usual diet. Drink more fluids than usual.  5. Have a responsible person drive you home and stay with you for at least 24 hours after your heart catheterization/angiography. 6. You may remove bandage from your Right, Groin and Arm in 24 hours. You may shower in 24 hours. No tub baths, hot tubs, or swimming for 1 week. Do not place any lotions, creams, powders, or ointments over puncture site for 1 week. You may place a clean band-aid over the puncture site each day for 5 days. Change daily. Percutaneous Coronary Intervention: What to Expect at [facility]     Percutaneous coronary intervention (PCI) is the name for procedures that are used to open a narrowed or blocked coronary artery. The two most common PCI procedures are coronary angioplasty and coronary stent placement. Your groin or arm may have a bruise and feel sore for a day or two after a percutaneous coronary intervention (PCI). You can do light activities around the house, but nothing strenuous for several days. This care sheet gives you a general idea about how long it will take for you to recover. But each person recovers at a different pace. Follow the steps below to get better as quickly as possible. How can you care for yourself at home?   Activity  · Do not do strenuous exercise and do not lift, pull, or push anything heavy until your doctor says it is okay. This may be for a day or two. You can walk around the house and do light activity, such as cooking. · You may shower 24 to 48 hours after the procedure, if your doctor okays it. Pat the incision dry. Do not take a bath for 1 week, or until your doctor tells you it is okay. · If the catheter was placed in your groin, try not to walk up stairs for the first couple of days. · If the catheter was placed in your arm near your wrist, do not bend your wrist deeply for the first couple of days. Be careful using your hand to get into and out of a chair or bed. · If your doctor recommends it, get more exercise. Walking is a good choice. Bit by bit, increase the amount you walk every day. Try for at least 30 minutes on most days of the week. Diet  · Drink plenty of fluids to help your body flush out the dye. If you have kidney, heart, or liver disease and have to limit fluids, talk with your doctor before you increase the amount of fluids you drink. · Keep eating a heart-healthy diet that has lots of fruits, vegetables, and whole grains. If you have not been eating this way, talk to your doctor. You also may want to talk to a dietitian. This expert can help you to learn about healthy foods and plan meals. Medicines  · Your doctor will tell you if and when you can restart your medicines. He or she will also give you instructions about taking any new medicines. · If you take blood thinners, such as warfarin (Coumadin), clopidogrel (Plavix), or aspirin, be sure to talk to your doctor. He or she will tell you if and when to start taking those medicines again. Make sure that you understand exactly what your doctor wants you to do. · Your doctor will prescribe blood-thinning medicines. You will likely take aspirin plus another antiplatelet, such as clopidogrel (Plavix). It is very important that you take these medicines exactly as directed.  These medicines help keep the coronary artery open and reduce your risk of a heart attack. · Call your doctor if you think you are having a problem with your medicine. Care of the catheter site  · For 1 or 2 days, keep a bandage over the spot where the catheter was inserted. The bandage probably will fall off in this time. · Put ice or a cold pack on the area for 10 to 20 minutes at a time to help with soreness or swelling. Put a thin cloth between the ice and your skin. Sedation for a Medical Procedure: Care Instructions  Your Care Instructions  For a minor procedure or surgery, you will get a sedative to help you relax. This drug will make you sleepy. It is usually given in a vein (by IV). A shot may also be used to numb the area. If you had local anesthesia, you may feel some pain and discomfort as it wears off. If you have pain, don't be afraid to say so. Pain medicine works better if you take it before the pain gets bad. Common side effects from sedation include:  · Feeling sleepy. (Your doctors and nurses will make sure you are not too sleepy to go home.)  · Nausea and vomiting. This usually does not last long. · Feeling tired. Follow-up care is a key part of your treatment and safety. Be sure to make and go to all appointments, and call your doctor if you are having problems. It's also a good idea to know your test results and keep a list of the medicines you take. How can you care for yourself at home? Activity  · Don't do anything for 24 hours that requires attention to detail. It takes time for the medicine effects to completely wear off. · For your safety, you should not drive or operate any machinery that could be dangerous until the medicine wears off and you can think clearly and react easily. · Rest when you feel tired. Getting enough sleep will help you recover. Diet  · You can eat your normal diet, unless your doctor gives you other instructions.  If your stomach is upset, try clear liquids and bland, low-fat foods like plain toast or rice. · Drink plenty of fluids (unless your doctor tells you not to). · Don't drink alcohol for 24 hours. Medicines  · Be safe with medicines. Read and follow all instructions on the label. ¨ If the doctor gave you a prescription medicine for pain, take it as prescribed. ¨ If you are not taking a prescription pain medicine, ask your doctor if you can take an over-the-counter medicine. · If you think your pain medicine is making you sick to your stomach:  ¨ Take your medicine after meals (unless your doctor has told you not to). ¨ Ask your doctor for a different pain medicine. Follow-up care is a key part of your treatment and safety. Be sure to make and go to all appointments, and call your doctor if you are having problems. It's also a good idea to know your test results and keep a list of the medicines you take. When should you call for help? Call 911 anytime you think you may need emergency care. For example, call if:  · You passed out (lost consciousness). · You have severe trouble breathing. · You have sudden chest pain and shortness of breath, or you cough up blood. · You have symptoms of a heart attack, such as:  ¨ Chest pain or pressure. ¨ Sweating. ¨ Shortness of breath. ¨ Nausea or vomiting. ¨ Pain that spreads from the chest to the neck, jaw, or one or both shoulders or arms. ¨ Dizziness or lightheadedness. ¨ A fast or uneven pulse. After calling 911, chew 1 adult-strength aspirin. Wait for an ambulance. Do not try to drive yourself. · You have been diagnosed with angina, and you have angina symptoms that do not go away with rest or are not getting better within 5 minutes after you take one dose of nitroglycerin. Call your doctor now or seek immediate medical care if:  · You are bleeding from the area where the catheter was put in your artery. · You have a fast-growing, painful lump at the catheter site.   · You have signs of infection, such as:  ¨ Increased pain, swelling, warmth, or redness. ¨ Red streaks leading from the catheter site. ¨ Pus draining from the catheter site. ¨ A fever. · Your leg or arm looks blue or feels cold, numb, or tingly. These are general instructions for a healthy lifestyle:    No smoking/ No tobacco products/ Avoid exposure to second hand smoke    Surgeon General's Warning:  Quitting smoking now greatly reduces serious risk to your health. Obesity, smoking, and sedentary lifestyle greatly increases your risk for illness    A healthy diet, regular physical exercise & weight monitoring are important for maintaining a healthy lifestyle    You may be retaining fluid if you have a history of heart failure or if you experience any of the following symptoms:  Weight gain of 3 pounds or more overnight or 5 pounds in a week, increased swelling in our hands or feet or shortness of breath while lying flat in bed. Please call your doctor as soon as you notice any of these symptoms; do not wait until your next office visit. Recognize signs and symptoms of STROKE:    F-face looks uneven    A-arms unable to move or move unevenly    S-speech slurred or non-existent    T-time-call 911 as soon as signs and symptoms begin-DO NOT go       Back to bed or wait to see if you get better-TIME IS BRAIN. Warning Signs of HEART ATTACK     Call 911 if you have these symptoms:   Chest discomfort. Most heart attacks involve discomfort in the center of the chest that lasts more than a few minutes, or that goes away and comes back. It can feel like uncomfortable pressure, squeezing, fullness, or pain.  Discomfort in other areas of the upper body. Symptoms can include pain or discomfort in one or both arms, the back, neck, jaw, or stomach.  Shortness of breath with or without chest discomfort.  Other signs may include breaking out in a cold sweat, nausea, or lightheadedness.   Don't wait more than five minutes to call 911 - MINUTES MATTER! Fast action can save your life. Calling 911 is almost always the fastest way to get lifesaving treatment. Emergency Medical Services staff can begin treatment when they arrive -- up to an hour sooner than if someone gets to the hospital by car. The discharge information has been reviewed with the patient. The patient verbalized understanding. Discharge medications reviewed with the patient and appropriate educational materials and side effects teaching were provided. DISCHARGE SUMMARY from Nurse    PATIENT INSTRUCTIONS:    After general anesthesia or intravenous sedation, for 24 hours or while taking prescription Narcotics:  · Limit your activities  · Do not drive and operate hazardous machinery  · Do not make important personal or business decisions  · Do  not drink alcoholic beverages  · If you have not urinated within 8 hours after discharge, please contact your surgeon on call. Report the following to your surgeon:  · Excessive pain, swelling, redness or odor of or around the surgical area  · Temperature over 100.5  · Nausea and vomiting lasting longer than 4 hours or if unable to take medications  · Any signs of decreased circulation or nerve impairment to extremity: change in color, persistent  numbness, tingling, coldness or increase pain  · Any questions    What to do at Home:  Recommended activity: Activity as tolerated and no driving for today,     *  Please give a list of your current medications to your Primary Care Provider. *  Please update this list whenever your medications are discontinued, doses are      changed, or new medications (including over-the-counter products) are added. *  Please carry medication information at all times in case of emergency situations.     These are general instructions for a healthy lifestyle:    No smoking/ No tobacco products/ Avoid exposure to second hand smoke  Surgeon General's Warning:  Quitting smoking now greatly reduces serious risk to your health. Obesity, smoking, and sedentary lifestyle greatly increases your risk for illness    A healthy diet, regular physical exercise & weight monitoring are important for maintaining a healthy lifestyle    You may be retaining fluid if you have a history of heart failure or if you experience any of the following symptoms:  Weight gain of 3 pounds or more overnight or 5 pounds in a week, increased swelling in our hands or feet or shortness of breath while lying flat in bed. Please call your doctor as soon as you notice any of these symptoms; do not wait until your next office visit. The discharge information has been reviewed with the patient. The patient verbalized understanding. Discharge medications reviewed with the patient and appropriate educational materials and side effects teaching were provided.   ___________________________________________________________________________________________________________________________________

## 2019-11-15 DIAGNOSIS — F41.9 ANXIETY: ICD-10-CM

## 2019-11-15 DIAGNOSIS — K21.9 GASTROESOPHAGEAL REFLUX DISEASE WITHOUT ESOPHAGITIS: ICD-10-CM

## 2019-11-15 RX ORDER — SERTRALINE HYDROCHLORIDE 25 MG/1
TABLET, FILM COATED ORAL
Qty: 90 TAB | Refills: 0 | Status: SHIPPED | OUTPATIENT
Start: 2019-11-15 | End: 2020-02-17

## 2019-11-15 RX ORDER — OMEPRAZOLE 20 MG/1
CAPSULE, DELAYED RELEASE ORAL
Qty: 90 CAP | Refills: 0 | Status: SHIPPED | OUTPATIENT
Start: 2019-11-15 | End: 2020-02-17

## 2019-11-26 DIAGNOSIS — I10 ESSENTIAL HYPERTENSION: ICD-10-CM

## 2019-11-26 RX ORDER — LISINOPRIL 20 MG/1
TABLET ORAL
Qty: 90 TAB | Refills: 0 | Status: SHIPPED | OUTPATIENT
Start: 2019-11-26 | End: 2020-02-25

## 2019-12-04 ENCOUNTER — OFFICE VISIT (OUTPATIENT)
Dept: CARDIOLOGY CLINIC | Age: 54
End: 2019-12-04

## 2019-12-04 ENCOUNTER — PATIENT OUTREACH (OUTPATIENT)
Dept: FAMILY MEDICINE CLINIC | Age: 54
End: 2019-12-04

## 2019-12-04 VITALS
BODY MASS INDEX: 33.75 KG/M2 | SYSTOLIC BLOOD PRESSURE: 144 MMHG | DIASTOLIC BLOOD PRESSURE: 100 MMHG | HEART RATE: 85 BPM | WEIGHT: 210 LBS | HEIGHT: 66 IN

## 2019-12-04 DIAGNOSIS — Q24.5 ANOMALOUS CORONARY ARTERY ORIGIN: Primary | ICD-10-CM

## 2019-12-04 DIAGNOSIS — R06.02 SOB (SHORTNESS OF BREATH): ICD-10-CM

## 2019-12-04 DIAGNOSIS — E78.2 MIXED HYPERLIPIDEMIA: ICD-10-CM

## 2019-12-04 DIAGNOSIS — I10 ESSENTIAL HYPERTENSION: ICD-10-CM

## 2019-12-04 NOTE — PROGRESS NOTES
1. Have you been to the ER, urgent care clinic since your last visit? Hospitalized since your last visit? No    2. Have you seen or consulted any other health care providers outside of the [address] [place] [address] since your last visit? Include any pap smears or colon screening.  No

## 2019-12-04 NOTE — PROGRESS NOTES
HISTORY OF PRESENT ILLNESS  Tor Noel is a 48 y.o. female. 10/2019  Patient is here for follow up of diagnostic tests. Results will be discussed. Hypertension   Associated symptoms include shortness of breath. Dizziness   The history is provided by the patient and medical records. This is a recurrent problem. The current episode started more than 1 week ago. The problem occurs every several days. The problem has been gradually worsening. Associated symptoms include shortness of breath. Nothing aggravates the symptoms. Nothing relieves the symptoms. Shortness of Breath   The history is provided by the patient and medical records. This is a recurrent problem. The problem occurs continuously. The current episode started more than 1 week ago. The problem has been gradually worsening. Pertinent negatives include no cough, no wheezing, no PND, no orthopnea, no vomiting, no leg swelling and no claudication.      Family History   Problem Relation Age of Onset    Heart Attack Mother     Coronary Artery Disease Mother     Heart Attack Father     Coronary Artery Disease Brother     Cancer Maternal Grandmother     Heart Attack Paternal Grandfather        Past Medical History:   Diagnosis Date    Diabetes ([address]     Pre diabetes    Essential hypertension     GERD (gastroesophageal reflux disease)     Glaucoma     Hyperlipidemia     Hypertension     Nausea & vomiting     nausea       Past Surgical History:   Procedure Laterality Date    HX CHOLECYSTECTOMY      HX CHOLECYSTECTOMY  2003    HX DILATION AND CURETTAGE  8/14    hysterectomy    HX HEENT      glaucoma    HX ORTHOPAEDIC      back    HX PARTIAL HYSTERECTOMY         Social History     Tobacco Use    Smoking status: Never Smoker    Smokeless tobacco: Never Used   Substance Use Topics    Alcohol use: Not Currently       Allergies   Allergen Reactions    Codeine Other (comments)    Doxycycline Other (comments)    Morphine Other (comments)     Chest burning    Morphine Other (comments)     \"chest burning\"       Prior to Admission medications    Medication Sig Start Date End Date Taking? Authorizing Provider   lisinopril (PRINIVIL, ZESTRIL) 20 mg tablet take 1 tablet by mouth once daily 11/26/19  Yes Zuhair Palafox MD   sertraline (ZOLOFT) 25 mg tablet take 1 tablet by mouth daily 11/15/19  Yes Zuhair Palafox MD   omeprazole (PRILOSEC) 20 mg capsule take 1 capsule by mouth daily 11/15/19  Yes Zuhair Palafox MD   NIFEdipine ER (PROCARDIA XL) 30 mg ER tablet Take 1 Tab by mouth daily. 10/16/19  Yes Derek Vences MD   metoprolol succinate (TOPROL-XL) 25 mg XL tablet Take 1 Tab by mouth daily. 10/16/19  Yes Derek Vences MD   atorvastatin (LIPITOR) 10 mg tablet Take 1 Tab by mouth daily. 10/16/19  Yes Irvin Kc NP   indomethacin (INDOCIN) 50 mg capsule take 1 capsule by mouth twice a day if needed 7/29/19  Yes Zuhair Palafox MD   Cetirizine (ZYRTEC) 10 mg cap Take 10 mg by mouth daily. 5/20/19  Yes Zuhair Palafox MD   dicyclomine (BENTYL) 20 mg tablet Take 1 Tab by mouth every six (6) hours as needed (abdominal cramps). 5/20/19  Yes Zuhair Palafox MD         Visit Vitals  BP (!) 144/100   Pulse 85   Ht 5' 6\" (1.676 m)   Wt 95.3 kg (210 lb)   LMP 01/28/2015   BMI 33.89 kg/m²     Echo 9/2019  Interpretation Summary        · Left Ventricle: Normal cavity size, systolic function (ejection fraction normal) and diastolic function. Mild concentric hypertrophy. Estimated left ventricular ejection fraction is 56 - 60%. No regional wall motion abnormality noted. · Right Ventricle: Normal right ventricular size and function. · Mitral Valve: Trace mitral valve regurgitation. · Tricuspid Valve: Mild tricuspid valve regurgitation is present. · Pulmonary Artery: There is no evidence of pulmonary hypertension. 10/2019  Nuclear Stress Test     Abnormal myocardial perfusion imaging.  Myocardial perfusion imaging supports an intermediate risk stress test.   There is no prior study available for comparison. . Moderate intensity ,partially reversible anterior wal defect,possible artifact although can not rule out ischemia and disease of LAD. Calcium score 5    Review of Systems   Constitutional: Positive for malaise/fatigue. Respiratory: Positive for shortness of breath. Negative for cough and wheezing. Cardiovascular: Positive for palpitations. Negative for orthopnea, claudication, leg swelling and PND. Gastrointestinal: Negative for nausea and vomiting. Musculoskeletal: Negative for falls. Neurological: Positive for dizziness. Endo/Heme/Allergies: Does not bruise/bleed easily. Physical Exam   Constitutional: She is oriented to person, place, and time. She appears well-developed and well-nourished. Neck: No JVD present. Cardiovascular: Normal rate, regular rhythm, normal heart sounds and intact distal pulses. Exam reveals no gallop and no friction rub. No murmur heard. Pulmonary/Chest: Effort normal and breath sounds normal. No respiratory distress. She has no wheezes. She has no rales. She exhibits no tenderness. Abdominal: Soft. She exhibits no distension and no mass. There is no tenderness. Musculoskeletal: Normal range of motion. General: No edema. Neurological: She is alert and oriented to person, place, and time. Skin: Skin is warm and dry. Psychiatric: She has a normal mood and affect. Nursing note and vitals reviewed. EKG - SR  Conclusion 11/2019       · Normal epicardial coronary arteries. 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 ASSESSMENT and PLAN    Ms. Radha Nowak has a reminder for a \"due or due soon\" health maintenance. I have asked that she contact her primary care provider for follow-up on this health maintenance. No flowsheet data found. Assessment         ICD-10-CM ICD-9-CM    1.  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 Symptom of exertional chest tightness and short of breath. Will proceed with CTA to evaluate course of right coronary artery   2. Essential hypertension I10 401.9     Stable continue treatment   3. Mixed hyperlipidemia E78.2 272.2 LIPID PANEL      HEPATIC FUNCTION PANEL    Continue statin lab with PCP   4. SOB (shortness of breath) R06.02 786.05     Exertional shortness of breath. Rule out ischemia     9/2019 Referred for episodes of dizziness with diaphoresis, nausea and SOB. Episodes occur with rest and last 10-30 minutes. She also reports occasional chest pain. She has strong family h/o CAD with mother having MI in 25s. Will obtain non-invasive evaluation due to symptoms and risk factors with stress test, echo, calcium score, event monitor and tilt table. To f/u post testing. 10/2019  Abnormal stress test with atypical chest pain strong family history and minimal calcium abnormality on CAT scan. Will proceed with cardiac catheterization for further evaluation. Decreased nifedipine due to positive tilt table test with vasodepressor response. Add low-dose Toprol. Hydration and stockings. 12/2019  Exertional shortness of breath and chest tightness. 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 Orders Placed This Encounter    LIPID PANEL     Standing Status:   Future     Standing Expiration Date:   6/3/2020    HEPATIC FUNCTION PANEL     Standing Status:   Future     Standing Expiration Date:   6/3/2020       Follow-up and Dispositions    · Return for F/u after tests.        Renetta Degroot MD

## 2019-12-05 ENCOUNTER — PATIENT OUTREACH (OUTPATIENT)
Dept: FAMILY MEDICINE CLINIC | Age: 54
End: 2019-12-05

## 2019-12-05 NOTE — PROGRESS NOTES
[facility] health screening: We agreed I would f/u mid January on her scheduling her mammogram and pap with Dr. Didi Ghosh and colonoscopy.

## 2019-12-17 NOTE — TELEPHONE ENCOUNTER
Contacted patient and verified identity using name and date of birth (2- identifiers)  Spoke with patient and scheduled appointment for Tuesday, January 28, 2020 10:45 AM

## 2019-12-23 ENCOUNTER — OFFICE VISIT (OUTPATIENT)
Dept: CARDIOLOGY CLINIC | Age: 54
End: 2019-12-23

## 2019-12-23 VITALS
DIASTOLIC BLOOD PRESSURE: 77 MMHG | HEART RATE: 90 BPM | BODY MASS INDEX: 33.91 KG/M2 | OXYGEN SATURATION: 96 % | HEIGHT: 66 IN | WEIGHT: 211 LBS | SYSTOLIC BLOOD PRESSURE: 138 MMHG

## 2019-12-23 DIAGNOSIS — R06.02 SOB (SHORTNESS OF BREATH): ICD-10-CM

## 2019-12-23 DIAGNOSIS — E78.2 MIXED HYPERLIPIDEMIA: ICD-10-CM

## 2019-12-23 DIAGNOSIS — Q24.5 ANOMALOUS CORONARY ARTERY ORIGIN: Primary | ICD-10-CM

## 2019-12-23 DIAGNOSIS — I10 ESSENTIAL HYPERTENSION: ICD-10-CM

## 2019-12-23 NOTE — PROGRESS NOTES
HISTORY OF PRESENT ILLNESS  Gilbert Mariscal is a 48 y.o. female. 10/2019  Patient is here for follow up of diagnostic tests. Results will be discussed. Hypertension   The history is provided by the patient. This is a chronic problem. The problem occurs constantly. Associated symptoms include shortness of breath. Dizziness   The history is provided by the patient and medical records. This is a recurrent problem. The current episode started more than 1 week ago. The problem occurs every several days. The problem has been gradually worsening. Associated symptoms include shortness of breath. Nothing aggravates the symptoms. Nothing relieves the symptoms. Shortness of Breath   The history is provided by the patient and medical records. This is a recurrent problem. The problem occurs continuously. The current episode started more than 1 week ago. The problem has been gradually worsening. Pertinent negatives include no cough, no wheezing, no PND, no orthopnea, no vomiting, no leg swelling and no claudication.      Family History   Problem Relation Age of Onset    Heart Attack Mother     Coronary Artery Disease Mother     Heart Attack Father     Coronary Artery Disease Brother     Cancer Maternal Grandmother     Heart Attack Paternal Grandfather        Past Medical History:   Diagnosis Date    Diabetes ([address]     Pre diabetes    Essential hypertension     GERD (gastroesophageal reflux disease)     Glaucoma     Hyperlipidemia     Hypertension     Nausea & vomiting     nausea       Past Surgical History:   Procedure Laterality Date    HX CHOLECYSTECTOMY      HX CHOLECYSTECTOMY  2003    HX DILATION AND CURETTAGE  8/14    hysterectomy    HX HEENT      glaucoma    HX ORTHOPAEDIC      back    HX PARTIAL HYSTERECTOMY         Social History     Tobacco Use    Smoking status: Never Smoker    Smokeless tobacco: Never Used   Substance Use Topics    Alcohol use: Not Currently       Allergies   Allergen Reactions    Codeine Other (comments)    Doxycycline Other (comments)    Morphine Other (comments)     Chest burning    Morphine Other (comments)     \"chest burning\"       Prior to Admission medications    Medication Sig Start Date End Date Taking? Authorizing Provider   lisinopril (PRINIVIL, ZESTRIL) 20 mg tablet take 1 tablet by mouth once daily 11/26/19  Yes Charles Lagos MD   sertraline (ZOLOFT) 25 mg tablet take 1 tablet by mouth daily 11/15/19  Yes Charles Lagos MD   omeprazole (PRILOSEC) 20 mg capsule take 1 capsule by mouth daily 11/15/19  Yes Charles Lagos MD   NIFEdipine ER (PROCARDIA XL) 30 mg ER tablet Take 1 Tab by mouth daily. 10/16/19  Yes Belle Phlegm, MD   metoprolol succinate (TOPROL-XL) 25 mg XL tablet Take 1 Tab by mouth daily. 10/16/19  Yes Belle Kamilagm, MD   atorvastatin (LIPITOR) 10 mg tablet Take 1 Tab by mouth daily. 10/16/19  Yes Irvin Kc NP   indomethacin (INDOCIN) 50 mg capsule take 1 capsule by mouth twice a day if needed 7/29/19  Yes Charles Lagos MD   Cetirizine (ZYRTEC) 10 mg cap Take 10 mg by mouth daily. 5/20/19  Yes Charles Lagos MD   dicyclomine (BENTYL) 20 mg tablet Take 1 Tab by mouth every six (6) hours as needed (abdominal cramps). 5/20/19  Yes Charles Lagos MD         Visit Vitals  /77   Pulse 90   Ht 5' 6\" (1.676 m)   Wt 95.7 kg (211 lb)   LMP 01/28/2015   SpO2 96%   BMI 34.06 kg/m²     Echo 9/2019  Interpretation Summary        · Left Ventricle: Normal cavity size, systolic function (ejection fraction normal) and diastolic function. Mild concentric hypertrophy. Estimated left ventricular ejection fraction is 56 - 60%. No regional wall motion abnormality noted. · Right Ventricle: Normal right ventricular size and function. · Mitral Valve: Trace mitral valve regurgitation. · Tricuspid Valve: Mild tricuspid valve regurgitation is present. · Pulmonary Artery: There is no evidence of pulmonary hypertension.      10/2019  Nuclear Stress Test     Abnormal myocardial perfusion imaging. Myocardial perfusion imaging supports an intermediate risk stress test.   There is no prior study available for comparison. . Moderate intensity ,partially reversible anterior wal defect,possible artifact although can not rule out ischemia and disease of LAD. Calcium score 5    Review of Systems   Constitutional: Positive for malaise/fatigue. Respiratory: Positive for shortness of breath. Negative for cough and wheezing. Cardiovascular: Positive for palpitations. Negative for orthopnea, claudication, leg swelling and PND. Gastrointestinal: Negative for nausea and vomiting. Musculoskeletal: Negative for falls. Neurological: Positive for dizziness. Endo/Heme/Allergies: Does not bruise/bleed easily. Physical Exam   Constitutional: She is oriented to person, place, and time. She appears well-developed and well-nourished. Neck: No JVD present. Cardiovascular: Normal rate, regular rhythm, normal heart sounds and intact distal pulses. Exam reveals no gallop and no friction rub. No murmur heard. Pulmonary/Chest: Effort normal and breath sounds normal. No respiratory distress. She has no wheezes. She has no rales. She exhibits no tenderness. Abdominal: Soft. She exhibits no distension and no mass. There is no tenderness. Musculoskeletal: Normal range of motion. General: No edema. Neurological: She is alert and oriented to person, place, and time. Skin: Skin is warm and dry. Psychiatric: She has a normal mood and affect. Nursing note and vitals reviewed. EKG - SR  Conclusion 11/2019       · Normal epicardial coronary arteries. 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 A stress test was performed following the Keven protocol, with the patient reaching stage 2. The patient achieved the target heart rate. The test was stopped because the patient complained of fatigue.    The patient reported no angina during stress. Onset of symptoms occurred at stage 0 of the protocol. The patient's response to exercise was adequate for diagnosis. Blood pressure demonstrated a normal response to exercise. Heart rate demonstrated maximal response to stress. Overall, the patient's exercise capacity was fair. Angina Index is 0. The Duke Treadmill score is 5 (low risk). Negative stress test.  CTA HEART WITH [address]  Other Result Information   This result has an attachment that is not available. Result Narrative   PATIENT: Meche Ornelas  : 1965  --------------------  Impression  --------------------  1. 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  Consider correlation with ischemia testing. 2. No evidence of coronary atherosclerosis. ASSESSMENT and PLAN    Ms. Deborah Jimenes has a reminder for a \"due or due soon\" health maintenance. I have asked that she contact her primary care provider for follow-up on this health maintenance. No flowsheet data found. Assessment         ICD-10-CM ICD-9-CM    1. 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 50% stenosis reported at origin on CTA. 2. Essential hypertension I10 401.9     Stable   3. SOB (shortness of breath) R06.02 786.05     Clinically does not appear to be angina equivalent. Will get a PFT   4. Mixed hyperlipidemia E78.2 272.2     Continue treatment     2019 Referred for episodes of dizziness with diaphoresis, nausea and SOB. Episodes occur with rest and last 10-30 minutes. She also reports occasional chest pain. She has strong family h/o CAD with mother having MI in 25s. Will obtain non-invasive evaluation due to symptoms and risk factors with stress test, echo, calcium score, event monitor and tilt table. To f/u post testing.   10/2019  Abnormal stress test with atypical chest pain strong family history and minimal calcium abnormality on CAT scan. Will proceed with cardiac catheterization for further evaluation. Decreased nifedipine due to positive tilt table test with vasodepressor response. Add low-dose Toprol. Hydration and stockings. 12/2019  Exertional shortness of breath and chest tightness. Anomalous right coronary artery will get a CT scan to evaluate for coronary course  12/2019  CTA showing malignant course of right coronary artery although reviewing all the data with normal perfusion in inferior wall and negative EKG stress test less likely that there is angina equivalent as a cause of her shortness of breath. I will get PFT. For time being continue control of blood pressure. Discussed option which would require surgical correction which we would hold off at this point  There are no discontinued medications. Orders Placed This Encounter    PFT DLCO     Standing Status:   Future     Standing Expiration Date:   6/21/2020       Follow-up and Dispositions    · Return in about 6 months (around 6/23/2020).        Getachew Carey MD

## 2019-12-23 NOTE — PROGRESS NOTES
1. Have you been to the ER, urgent care clinic since your last visit? Hospitalized since your last visit? No    2. Have you seen or consulted any other health care providers outside of the [address] [place] [address] since your last visit? Include any pap smears or colon screening.  No

## 2020-01-07 ENCOUNTER — HOSPITAL ENCOUNTER (OUTPATIENT)
Dept: RESPIRATORY THERAPY | Age: 55
Discharge: HOME | End: 2020-01-07
Attending: INTERNAL MEDICINE
Payer: COMMERCIAL

## 2020-01-07 DIAGNOSIS — G89.29 OTHER CHRONIC PAIN: ICD-10-CM

## 2020-01-07 DIAGNOSIS — Q24.5 ANOMALOUS CORONARY ARTERY ORIGIN: ICD-10-CM

## 2020-01-07 PROCEDURE — 94729 DIFFUSING CAPACITY: CPT

## 2020-01-07 PROCEDURE — 94010 BREATHING CAPACITY TEST: CPT

## 2020-01-07 PROCEDURE — 94726 PLETHYSMOGRAPHY LUNG VOLUMES: CPT

## 2020-01-08 RX ORDER — INDOMETHACIN 50 MG/1
CAPSULE ORAL
Qty: 30 CAP | Refills: 0 | Status: SHIPPED | OUTPATIENT
Start: 2020-01-08 | End: 2020-04-28

## 2020-01-27 ENCOUNTER — HOSPITAL ENCOUNTER (EMERGENCY)
Age: 55
Discharge: HOME | End: 2020-01-27
Attending: EMERGENCY MEDICINE
Payer: COMMERCIAL

## 2020-01-27 VITALS
HEART RATE: 85 BPM | RESPIRATION RATE: 14 BRPM | HEIGHT: 66 IN | SYSTOLIC BLOOD PRESSURE: 121 MMHG | DIASTOLIC BLOOD PRESSURE: 79 MMHG | TEMPERATURE: 98.4 F | WEIGHT: 210 LBS | OXYGEN SATURATION: 99 % | BODY MASS INDEX: 33.75 KG/M2

## 2020-01-27 DIAGNOSIS — R10.9 ABDOMINAL PAIN, UNSPECIFIED ABDOMINAL LOCATION: ICD-10-CM

## 2020-01-27 DIAGNOSIS — R11.0 NAUSEA WITHOUT VOMITING: Primary | ICD-10-CM

## 2020-01-27 DIAGNOSIS — R19.7 DIARRHEA, UNSPECIFIED TYPE: ICD-10-CM

## 2020-01-27 LAB
ALBUMIN SERPL-MCNC: 3.8 G/DL (ref 3.4–5)
ALBUMIN/GLOB SERPL: 1 {RATIO} (ref 0.8–1.7)
ALP SERPL-CCNC: 140 U/L (ref 45–117)
ALT SERPL-CCNC: 25 U/L (ref 13–56)
ANION GAP SERPL CALC-SCNC: 8 MMOL/L (ref 3–18)
APPEARANCE UR: CLEAR
AST SERPL-CCNC: 15 U/L (ref 10–38)
BASOPHILS # BLD: 0 K/UL (ref 0–0.1)
BASOPHILS NFR BLD: 0 % (ref 0–2)
BILIRUB SERPL-MCNC: 0.4 MG/DL (ref 0.2–1)
BILIRUB UR QL: NEGATIVE
BUN SERPL-MCNC: 18 MG/DL (ref 7–18)
BUN/CREAT SERPL: 24 (ref 12–20)
CALCIUM SERPL-MCNC: 9.1 MG/DL (ref 8.5–10.1)
CHLORIDE SERPL-SCNC: 107 MMOL/L (ref 100–111)
CO2 SERPL-SCNC: 25 MMOL/L (ref 21–32)
COLOR UR: YELLOW
CREAT SERPL-MCNC: 0.74 MG/DL (ref 0.6–1.3)
DIFFERENTIAL METHOD BLD: ABNORMAL
EOSINOPHIL # BLD: 0.1 K/UL (ref 0–0.4)
EOSINOPHIL NFR BLD: 1 % (ref 0–5)
ERYTHROCYTE [DISTWIDTH] IN BLOOD BY AUTOMATED COUNT: 14.1 % (ref 11.6–14.5)
GLOBULIN SER CALC-MCNC: 4 G/DL (ref 2–4)
GLUCOSE SERPL-MCNC: 144 MG/DL (ref 74–99)
GLUCOSE UR STRIP.AUTO-MCNC: NEGATIVE MG/DL
HCT VFR BLD AUTO: 44.4 % (ref 35–45)
HGB BLD-MCNC: 13.9 G/DL (ref 12–16)
HGB UR QL STRIP: NEGATIVE
KETONES UR QL STRIP.AUTO: NEGATIVE MG/DL
LEUKOCYTE ESTERASE UR QL STRIP.AUTO: NEGATIVE
LIPASE SERPL-CCNC: 109 U/L (ref 73–393)
LYMPHOCYTES # BLD: 0.6 K/UL (ref 0.9–3.6)
LYMPHOCYTES NFR BLD: 6 % (ref 21–52)
MCH RBC QN AUTO: 28.1 PG (ref 24–34)
MCHC RBC AUTO-ENTMCNC: 31.3 G/DL (ref 31–37)
MCV RBC AUTO: 89.9 FL (ref 74–97)
MONOCYTES # BLD: 0.5 K/UL (ref 0.05–1.2)
MONOCYTES NFR BLD: 5 % (ref 3–10)
NEUTS SEG # BLD: 9.1 K/UL (ref 1.8–8)
NEUTS SEG NFR BLD: 88 % (ref 40–73)
NITRITE UR QL STRIP.AUTO: NEGATIVE
PH UR STRIP: 7 [PH] (ref 5–8)
PLATELET # BLD AUTO: 298 K/UL (ref 135–420)
PMV BLD AUTO: 10.4 FL (ref 9.2–11.8)
POTASSIUM SERPL-SCNC: 4.1 MMOL/L (ref 3.5–5.5)
PROT SERPL-MCNC: 7.8 G/DL (ref 6.4–8.2)
PROT UR STRIP-MCNC: NEGATIVE MG/DL
RBC # BLD AUTO: 4.94 M/UL (ref 4.2–5.3)
SODIUM SERPL-SCNC: 140 MMOL/L (ref 136–145)
SP GR UR REFRACTOMETRY: 1.03 (ref 1–1.03)
UROBILINOGEN UR QL STRIP.AUTO: 1 EU/DL (ref 0.2–1)
WBC # BLD AUTO: 10.3 K/UL (ref 4.6–13.2)

## 2020-01-27 PROCEDURE — 85025 COMPLETE CBC W/AUTO DIFF WBC: CPT

## 2020-01-27 PROCEDURE — 96374 THER/PROPH/DIAG INJ IV PUSH: CPT

## 2020-01-27 PROCEDURE — 83690 ASSAY OF LIPASE: CPT

## 2020-01-27 PROCEDURE — 74011250637 HC RX REV CODE- 250/637: Performed by: PHYSICIAN ASSISTANT

## 2020-01-27 PROCEDURE — 74011250636 HC RX REV CODE- 250/636: Performed by: PHYSICIAN ASSISTANT

## 2020-01-27 PROCEDURE — 96361 HYDRATE IV INFUSION ADD-ON: CPT

## 2020-01-27 PROCEDURE — 99284 EMERGENCY DEPT VISIT MOD MDM: CPT

## 2020-01-27 PROCEDURE — 80053 COMPREHEN METABOLIC PANEL: CPT

## 2020-01-27 PROCEDURE — 81003 URINALYSIS AUTO W/O SCOPE: CPT

## 2020-01-27 RX ORDER — ACETAMINOPHEN 500 MG
1000 TABLET ORAL
Status: COMPLETED | OUTPATIENT
Start: 2020-01-27 | End: 2020-01-27

## 2020-01-27 RX ORDER — IBUPROFEN 400 MG/1
800 TABLET ORAL
Status: COMPLETED | OUTPATIENT
Start: 2020-01-27 | End: 2020-01-27

## 2020-01-27 RX ORDER — ONDANSETRON 2 MG/ML
4 INJECTION INTRAMUSCULAR; INTRAVENOUS ONCE
Status: COMPLETED | OUTPATIENT
Start: 2020-01-27 | End: 2020-01-27

## 2020-01-27 RX ORDER — ONDANSETRON 4 MG/1
4 TABLET, ORALLY DISINTEGRATING ORAL
Qty: 15 TAB | Refills: 0 | Status: SHIPPED | OUTPATIENT
Start: 2020-01-27 | End: 2020-05-13

## 2020-01-27 RX ADMIN — IBUPROFEN 800 MG: 400 TABLET ORAL at 15:31

## 2020-01-27 RX ADMIN — SODIUM CHLORIDE 1000 ML: 900 INJECTION, SOLUTION INTRAVENOUS at 14:57

## 2020-01-27 RX ADMIN — ONDANSETRON 4 MG: 2 INJECTION INTRAMUSCULAR; INTRAVENOUS at 15:04

## 2020-01-27 RX ADMIN — ACETAMINOPHEN 1000 MG: 500 TABLET ORAL at 15:31

## 2020-01-27 NOTE — DISCHARGE INSTRUCTIONS
Patient Education        Abdominal Pain: Care Instructions  Your Care Instructions    Abdominal pain has many possible causes. Some aren't serious and get better on their own in a few days. Others need more testing and treatment. If your pain continues or gets worse, you need to be rechecked and may need more tests to find out what is wrong. You may need surgery to correct the problem. Don't ignore new symptoms, such as fever, nausea and vomiting, urination problems, pain that gets worse, and dizziness. These may be signs of a more serious problem. Your doctor may have recommended a follow-up visit in the next 8 to 12 hours. If you are not getting better, you may need more tests or treatment. The doctor has checked you carefully, but problems can develop later. If you notice any problems or new symptoms, get medical treatment right away. Follow-up care is a key part of your treatment and safety. Be sure to make and go to all appointments, and call your doctor if you are having problems. It's also a good idea to know your test results and keep a list of the medicines you take. How can you care for yourself at home? · Rest until you feel better. · To prevent dehydration, drink plenty of fluids, enough so that your urine is light yellow or clear like water. Choose water and other caffeine-free clear liquids until you feel better. If you have kidney, heart, or liver disease and have to limit fluids, talk with your doctor before you increase the amount of fluids you drink. · If your stomach is upset, eat mild foods, such as rice, dry toast or crackers, bananas, and applesauce. Try eating several small meals instead of two or three large ones. · Wait until 48 hours after all symptoms have gone away before you have spicy foods, alcohol, and drinks that contain caffeine. · Do not eat foods that are high in fat. · Avoid anti-inflammatory medicines such as aspirin, ibuprofen (Advil, Motrin), and naproxen (Aleve). These can cause stomach upset. Talk to your doctor if you take daily aspirin for another health problem. When should you call for help? Call 911 anytime you think you may need emergency care. For example, call if:    · You passed out (lost consciousness).     · You pass maroon or very bloody stools.     · You vomit blood or what looks like coffee grounds.     · You have new, severe belly pain.    Call your doctor now or seek immediate medical care if:    · Your pain gets worse, especially if it becomes focused in one area of your belly.     · You have a new or higher fever.     · Your stools are black and look like tar, or they have streaks of blood.     · You have unexpected vaginal bleeding.     · You have symptoms of a urinary tract infection. These may include:  ? Pain when you urinate. ? Urinating more often than usual.  ? Blood in your urine.     · You are dizzy or lightheaded, or you feel like you may faint.    Watch closely for changes in your health, and be sure to contact your doctor if:    · You are not getting better after 1 day (24 hours). Where can you learn more? Go to http://ardenRC Transportationemiliano.info/. Enter Q920 in the search box to learn more about \"Abdominal Pain: Care Instructions. \"  Current as of: June 26, 2019  Content Version: 12.2  © 4205-2481 [employer]. Care instructions adapted under license by [employer] (which disclaims liability or warranty for this information). If you have questions about a medical condition or this instruction, always ask your healthcare professional. [place] [address] any warranty or liability for your use of this information. Patient Education        Diarrhea: Care Instructions  Your Care Instructions    Diarrhea is loose, watery stools (bowel movements). The exact cause is often hard to find. Sometimes diarrhea is your body's way of getting rid of what caused an upset stomach.  Viruses, food poisoning, and many medicines can cause diarrhea. Some people get diarrhea in response to emotional stress, anxiety, or certain foods. Almost everyone has diarrhea now and then. It usually isn't serious, and your stools will return to normal soon. The important thing to do is replace the fluids you have lost, so you can prevent dehydration. The doctor has checked you carefully, but problems can develop later. If you notice any problems or new symptoms, get medical treatment right away. Follow-up care is a key part of your treatment and safety. Be sure to make and go to all appointments, and call your doctor if you are having problems. It's also a good idea to know your test results and keep a list of the medicines you take. How can you care for yourself at home? · Watch for signs of dehydration, which means your body has lost too much water. Dehydration is a serious condition and should be treated right away. Signs of dehydration are:  ? Increasing thirst and dry eyes and mouth. ? Feeling faint or lightheaded. ? A smaller amount of urine than normal.  · To prevent dehydration, drink plenty of fluids. Choose water and other caffeine-free clear liquids until you feel better. If you have kidney, heart, or liver disease and have to limit fluids, talk with your doctor before you increase the amount of fluids you drink. · Begin eating small amounts of mild foods the next day, if you feel like it. ? Try yogurt that has live cultures of Lactobacillus. (Check the label.)  ? Avoid spicy foods, fruits, alcohol, and caffeine until 48 hours after all symptoms are gone. ? Avoid chewing gum that contains sorbitol. ? Avoid dairy products (except for yogurt with Lactobacillus) while you have diarrhea and for 3 days after symptoms are gone. · The doctor may recommend that you take over-the-counter medicine, such as loperamide (Imodium), if you still have diarrhea after 6 hours.  Read and follow all instructions on the label. Do not use this medicine if you have bloody diarrhea, a high fever, or other signs of serious illness. Call your doctor if you think you are having a problem with your medicine. When should you call for help? Call 911 anytime you think you may need emergency care. For example, call if:    · You passed out (lost consciousness).     · Your stools are maroon or very bloody.    Call your doctor now or seek immediate medical care if:    · You are dizzy or lightheaded, or you feel like you may faint.     · Your stools are black and look like tar, or they have streaks of blood.     · You have new or worse belly pain.     · You have symptoms of dehydration, such as:  ? Dry eyes and a dry mouth. ? Passing only a little dark urine. ? Feeling thirstier than usual.     · You have a new or higher fever.    Watch closely for changes in your health, and be sure to contact your doctor if:    · Your diarrhea is getting worse.     · You see pus in the diarrhea.     · You are not getting better after 2 days (48 hours). Where can you learn more? Go to http://arden-emiliano.info/. Enter K112 in the search box to learn more about \"Diarrhea: Care Instructions. \"  Current as of: June 26, 2019  Content Version: 12.2  © 0704-9536 [employer]. Care instructions adapted under license by [employer] (which disclaims liability or warranty for this information). If you have questions about a medical condition or this instruction, always ask your healthcare professional. [place] [address] any warranty or liability for your use of this information. Patient Education        Nausea and Vomiting: Care Instructions  Your Care Instructions    When you are nauseated, you may feel weak and sweaty and notice a lot of saliva in your mouth. Nausea often leads to vomiting.  Most of the time you do not need to worry about nausea and vomiting, but they can be signs of other illnesses. Two common causes of nausea and vomiting are stomach flu and food poisoning. Nausea and vomiting from viral stomach flu will usually start to improve within 24 hours. Nausea and vomiting from food poisoning may last from 12 to 48 hours. The doctor has checked you carefully, but problems can develop later. If you notice any problems or new symptoms, get medical treatment right away. Follow-up care is a key part of your treatment and safety. Be sure to make and go to all appointments, and call your doctor if you are having problems. It's also a good idea to know your test results and keep a list of the medicines you take. How can you care for yourself at home? · To prevent dehydration, drink plenty of fluids, enough so that your urine is light yellow or clear like water. Choose water and other caffeine-free clear liquids until you feel better. If you have kidney, heart, or liver disease and have to limit fluids, talk with your doctor before you increase the amount of fluids you drink. · Rest in bed until you feel better. · When you are able to eat, try clear soups, mild foods, and liquids until all symptoms are gone for 12 to 48 hours. Other good choices include dry toast, crackers, cooked cereal, and gelatin dessert, such as Jell-O. When should you call for help? Call 911 anytime you think you may need emergency care. For example, call if:    · You passed out (lost consciousness).    Call your doctor now or seek immediate medical care if:    · You have symptoms of dehydration, such as:  ? Dry eyes and a dry mouth. ? Passing only a little dark urine. ?  Feeling thirstier than usual.     · You have new or worsening belly pain.     · You have a new or higher fever.     · You vomit blood or what looks like coffee grounds.    Watch closely for changes in your health, and be sure to contact your doctor if:    · You have ongoing nausea and vomiting.     · Your vomiting is getting worse.     · Your vomiting lasts longer than 2 days.     · You are not getting better as expected. Where can you learn more? Go to http://arden-emiliano.info/. Enter 25 051038 in the search box to learn more about \"Nausea and Vomiting: Care Instructions. \"  Current as of: June 26, 2019  Content Version: 12.2  © 0095-1039 [employer]. Care instructions adapted under license by [employer] (which disclaims liability or warranty for this information). If you have questions about a medical condition or this instruction, always ask your healthcare professional. [address] any warranty or liability for your use of this information.

## 2020-01-27 NOTE — ED TRIAGE NOTES
Patient c/o abdominal pain, diarrhea, nausea and lower back pain since yesterday. States other family members ill with similar symptoms.

## 2020-01-27 NOTE — ED NOTES
Pt ambulatory to bathroom to obtain urine. Did nit have diarrhea at that time. Pt requesting something to drink. Advised not to drink anything at this time.  Will give something for nausea

## 2020-01-27 NOTE — ED PROVIDER NOTES
EMERGENCY DEPARTMENT HISTORY AND PHYSICAL EXAM    2:26 PM      Date: 1/27/2020  Patient Name: Deja Justin    History of Presenting Illness     Chief Complaint   Patient presents with    Diarrhea    Nausea    Back Pain    Abdominal Pain         History Provided By: Patient    Additional History (Context): Deja Justin is a 48 y.o. female with diabetes, hypertension, hyperlipidemia and chronic back pain who presents with complaints of abdominal pain, diarrhea, nausea since yesterday. Patient also states she has a history of chronic low back pain, had surgery many years ago with occasional flareups. Patient denies any vomiting at this time. Patient states she has had occasional chills, denies any recorded fevers. Patient states that if she feels it if she could vomit, she would feel better. Patient denies any bloody diarrhea. Patient reports that she uses city water at home, denies untreated water. Denies recent antibiotic use. Denies new foods. PCP: Giovanni Mauro MD    Current Facility-Administered Medications   Medication Dose Route Frequency Provider Last Rate Last Dose    sodium chloride 0.9 % bolus infusion 1,000 mL  1,000 mL IntraVENous ONCE Kimani Luis PA-C 1,000 mL/hr at 01/27/20 1457 1,000 mL at 01/27/20 1457     Current Outpatient Medications   Medication Sig Dispense Refill    ondansetron (ZOFRAN ODT) 4 mg disintegrating tablet Take 1 Tab by mouth every eight (8) hours as needed for Nausea or Vomiting. 15 Tab 0    indomethacin (INDOCIN) 50 mg capsule take 1 capsule by mouth twice a day if needed 30 Cap 0    lisinopril (PRINIVIL, ZESTRIL) 20 mg tablet take 1 tablet by mouth once daily 90 Tab 0    sertraline (ZOLOFT) 25 mg tablet take 1 tablet by mouth daily 90 Tab 0    omeprazole (PRILOSEC) 20 mg capsule take 1 capsule by mouth daily 90 Cap 0    NIFEdipine ER (PROCARDIA XL) 30 mg ER tablet Take 1 Tab by mouth daily.  30 Tab 6    metoprolol succinate (TOPROL-XL) 25 mg XL tablet Take 1 Tab by mouth daily. 30 Tab 6    atorvastatin (LIPITOR) 10 mg tablet Take 1 Tab by mouth daily. 90 Tab 2    Cetirizine (ZYRTEC) 10 mg cap Take 10 mg by mouth daily. 30 Cap 2    dicyclomine (BENTYL) 20 mg tablet Take 1 Tab by mouth every six (6) hours as needed (abdominal cramps). 80 Tab 1       Past History     Past Medical History:  Past Medical History:   Diagnosis Date    Diabetes ([address]     Pre diabetes    Essential hypertension     GERD (gastroesophageal reflux disease)     Glaucoma     Hyperlipidemia     Hypertension     Nausea & vomiting     nausea       Past Surgical History:  Past Surgical History:   Procedure Laterality Date    HX CHOLECYSTECTOMY      HX CHOLECYSTECTOMY  2003    HX DILATION AND CURETTAGE  8/14    hysterectomy    HX HEENT      glaucoma    HX ORTHOPAEDIC      back    HX PARTIAL HYSTERECTOMY         Family History:  Family History   Problem Relation Age of Onset    Heart Attack Mother     Coronary Artery Disease Mother     Heart Attack Father     Coronary Artery Disease Brother     Cancer Maternal Grandmother     Heart Attack Paternal Grandfather        Social History:  Social History     Tobacco Use    Smoking status: Never Smoker    Smokeless tobacco: Never Used   Substance Use Topics    Alcohol use: Not Currently    Drug use: Never       Allergies: Allergies   Allergen Reactions    Codeine Other (comments)    Doxycycline Other (comments)    Morphine Other (comments)     Chest burning    Morphine Other (comments)     \"chest burning\"         Review of Systems       Review of Systems   Constitutional: Positive for chills. Negative for diaphoresis, fatigue and fever. HENT: Negative. Respiratory: Negative. Cardiovascular: Negative. Gastrointestinal: Positive for abdominal pain, diarrhea and nausea. Negative for vomiting. Genitourinary: Negative for difficulty urinating, dysuria, flank pain, frequency, hematuria, pelvic pain and urgency. Musculoskeletal: Positive for back pain. Neurological: Negative. All other systems reviewed and are negative. Physical Exam     Visit Vitals  /79 (BP 1 Location: Left arm, BP Patient Position: At rest)   Pulse 85   Temp 98.4 °F (36.9 °C)   Resp 14   Ht 5' 5.5\" (1.664 m)   Wt 95.3 kg (210 lb)   LMP 01/28/2015   SpO2 99%   BMI 34.41 kg/m²         Physical Exam  Vitals signs reviewed. Constitutional:       General: She is not in acute distress. Appearance: She is well-developed and normal weight. She is not ill-appearing, toxic-appearing or diaphoretic. HENT:      Head: Normocephalic and atraumatic. Right Ear: External ear normal.      Left Ear: External ear normal.      Nose: Nose normal. No congestion. Mouth/Throat:      Mouth: Mucous membranes are moist.      Pharynx: Oropharynx is clear. Eyes:      Extraocular Movements: Extraocular movements intact. Neck:      Musculoskeletal: Neck supple. Cardiovascular:      Rate and Rhythm: Normal rate and regular rhythm. Pulses: Normal pulses. Heart sounds: Normal heart sounds. No murmur. No gallop. Pulmonary:      Effort: Pulmonary effort is normal.      Breath sounds: No wheezing, rhonchi or rales. Abdominal:      General: Bowel sounds are normal. There is no distension. Palpations: Abdomen is soft. There is no mass. Tenderness: There is no tenderness. There is no right CVA tenderness, left CVA tenderness, guarding or rebound. Skin:     General: Skin is warm. Capillary Refill: Capillary refill takes less than 2 seconds. Neurological:      General: No focal deficit present. Mental Status: She is alert and oriented to person, place, and time. Cranial Nerves: No cranial nerve deficit.            Diagnostic Study Results     Labs -  Recent Results (from the past 12 hour(s))   CBC WITH AUTOMATED DIFF    Collection Time: 01/27/20  2:29 PM   Result Value Ref Range    WBC 10.3 4.6 - 13.2 K/uL RBC 4.94 4.20 - 5.30 M/uL    HGB 13.9 12.0 - 16.0 g/dL    HCT 44.4 35.0 - 45.0 %    MCV 89.9 74.0 - 97.0 FL    MCH 28.1 24.0 - 34.0 PG    MCHC 31.3 31.0 - 37.0 g/dL    RDW 14.1 11.6 - 14.5 %    PLATELET 169 495 - 102 K/uL    MPV 10.4 9.2 - 11.8 FL    NEUTROPHILS 88 (H) 40 - 73 %    LYMPHOCYTES 6 (L) 21 - 52 %    MONOCYTES 5 3 - 10 %    EOSINOPHILS 1 0 - 5 %    BASOPHILS 0 0 - 2 %    ABS. NEUTROPHILS 9.1 (H) 1.8 - 8.0 K/UL    ABS. LYMPHOCYTES 0.6 (L) 0.9 - 3.6 K/UL    ABS. MONOCYTES 0.5 0.05 - 1.2 K/UL    ABS. EOSINOPHILS 0.1 0.0 - 0.4 K/UL    ABS. BASOPHILS 0.0 0.0 - 0.1 K/UL    DF AUTOMATED     METABOLIC PANEL, COMPREHENSIVE    Collection Time: 01/27/20  2:29 PM   Result Value Ref Range    Sodium 140 136 - 145 mmol/L    Potassium 4.1 3.5 - 5.5 mmol/L    Chloride 107 100 - 111 mmol/L    CO2 25 21 - 32 mmol/L    Anion gap 8 3.0 - 18 mmol/L    Glucose 144 (H) 74 - 99 mg/dL    BUN 18 7.0 - 18 MG/DL    Creatinine 0.74 0.6 - 1.3 MG/DL    BUN/Creatinine ratio 24 (H) 12 - 20      GFR est AA >60 >60 ml/min/1.73m2    GFR est non-AA >60 >60 ml/min/1.73m2    Calcium 9.1 8.5 - 10.1 MG/DL    Bilirubin, total 0.4 0.2 - 1.0 MG/DL    ALT (SGPT) 25 13 - 56 U/L    AST (SGOT) 15 10 - 38 U/L    Alk.  phosphatase 140 (H) 45 - 117 U/L    Protein, total 7.8 6.4 - 8.2 g/dL    Albumin 3.8 3.4 - 5.0 g/dL    Globulin 4.0 2.0 - 4.0 g/dL    A-G Ratio 1.0 0.8 - 1.7     LIPASE    Collection Time: 01/27/20  2:29 PM   Result Value Ref Range    Lipase 109 73 - 393 U/L   URINALYSIS W/ RFLX MICROSCOPIC    Collection Time: 01/27/20  2:29 PM   Result Value Ref Range    Color YELLOW      Appearance CLEAR      Specific gravity 1.028 1.005 - 1.030      pH (UA) 7.0 5.0 - 8.0      Protein NEGATIVE  NEG mg/dL    Glucose NEGATIVE  NEG mg/dL    Ketone NEGATIVE  NEG mg/dL    Bilirubin NEGATIVE  NEG      Blood NEGATIVE  NEG      Urobilinogen 1.0 0.2 - 1.0 EU/dL    Nitrites NEGATIVE  NEG      Leukocyte Esterase NEGATIVE  NEG         Radiologic Studies -   No orders to display         Medical Decision Making   I am the first provider for this patient. I reviewed the vital signs, available nursing notes, past medical history, past surgical history, family history and social history. Vital Signs-Reviewed the patient's vital signs. Records Reviewed: Nursing Notes and Old Medical Records (Time of Review: 2:26 PM)    ED Course: Progress Notes, Reevaluation, and Consults:  2:26 PM Met with patient, reviewed history, performed physical exam. Will check CBC, CMP, lipase, and urinalysis. Will also give patient a 1L normal saline bolus given history of multiple episodes of diarrhea.     3:21 PM CBC shows no leukocytosis but does show left shift. CMP shows mild alk phos elevation. UA negative. Patient requesting that she be discharged so she can go home and get in bed. Discussed continued IVF and PO trial with patient who agreed. 3:39 PM Patient has tolerated PO water and PO medications without significant nausea or any vomiting. Patient requesting discharge so she can go home. Vital signs have improved. Patient remains afebrile at this time. Provider Notes (Medical Decision Making):   48year old female seen in the ED for complaint of abdominal pain, nausea, and diarrhea. Patient denies any fevers or chills at home, denies new foods or untreated water, denies recent antibiotic use. Patient's vital signs are stable at time of discharge. Patient was given a 1L normal saline bolus, Zofran, Tylenol, and ibuprofen in the ED with resolution of her symptoms. She did not have any diarrhea while in the department. The patient's CBC showed a left shift but no leukocytosis, CMP was unremarkable, and urinalysis was negative. After lab work and PO challenge, patient requested to be discharged home. Patient advised to follow up with PCP as soon as possible. Patient advised to return to the ED immediately if symptoms worsen. Diagnosis     Clinical Impression:   1.  Nausea without vomiting    2. Diarrhea, unspecified type    3. Abdominal pain, unspecified abdominal location        Disposition: Home    Follow-up Information     Follow up With Specialties Details Why Contact Info    Keely Zuniga MD Family Practice Call in 1 day For follow up regarding ER visit. [address] EMERGENCY DEPT Emergency Medicine  Immediately if symptoms worsen. [address]  [phone]           Patient's Medications   Start Taking    ONDANSETRON (ZOFRAN ODT) 4 MG DISINTEGRATING TABLET    Take 1 Tab by mouth every eight (8) hours as needed for Nausea or Vomiting. Continue Taking    ATORVASTATIN (LIPITOR) 10 MG TABLET    Take 1 Tab by mouth daily. CETIRIZINE (ZYRTEC) 10 MG CAP    Take 10 mg by mouth daily. DICYCLOMINE (BENTYL) 20 MG TABLET    Take 1 Tab by mouth every six (6) hours as needed (abdominal cramps). INDOMETHACIN (INDOCIN) 50 MG CAPSULE    take 1 capsule by mouth twice a day if needed    LISINOPRIL (PRINIVIL, ZESTRIL) 20 MG TABLET    take 1 tablet by mouth once daily    METOPROLOL SUCCINATE (TOPROL-XL) 25 MG XL TABLET    Take 1 Tab by mouth daily. NIFEDIPINE ER (PROCARDIA XL) 30 MG ER TABLET    Take 1 Tab by mouth daily. OMEPRAZOLE (PRILOSEC) 20 MG CAPSULE    take 1 capsule by mouth daily    SERTRALINE (ZOLOFT) 25 MG TABLET    take 1 tablet by mouth daily   These Medications have changed    No medications on file   Stop Taking    No medications on file     Tomy Connors PA-C    Dictation disclaimer:  Please note that this dictation was completed with [employer], the computer voice recognition software. Quite often unanticipated grammatical, syntax, homophones, and other interpretive errors are inadvertently transcribed by the computer software. Please disregard these errors. Please excuse any errors that have escaped final proofreading.

## 2020-01-27 NOTE — LETTER
[facility] EMERGENCY DEPT 
[address] [facility] [id number] 
[phone] Work/School Note Date: 1/27/2020 To Whom It May concern: 
 
Oumou Howard was seen and treated today in the emergency room by the following provider(s): 
Attending Provider: Margo Ureña DO Physician Assistant: Gerald Fuentes PA-C. Oumou Howard may return to work on 1/29/20. Sincerely, Talia Gutierrez PA-C

## 2020-02-13 ENCOUNTER — PATIENT OUTREACH (OUTPATIENT)
Dept: FAMILY MEDICINE CLINIC | Age: 55
End: 2020-02-13

## 2020-02-14 DIAGNOSIS — K21.9 GASTROESOPHAGEAL REFLUX DISEASE WITHOUT ESOPHAGITIS: ICD-10-CM

## 2020-02-14 DIAGNOSIS — F41.9 ANXIETY: ICD-10-CM

## 2020-02-17 RX ORDER — OMEPRAZOLE 20 MG/1
CAPSULE, DELAYED RELEASE ORAL
Qty: 90 CAP | Refills: 0 | Status: SHIPPED | OUTPATIENT
Start: 2020-02-17 | End: 2020-05-13 | Stop reason: SDUPTHER

## 2020-02-17 RX ORDER — SERTRALINE HYDROCHLORIDE 25 MG/1
TABLET, FILM COATED ORAL
Qty: 90 TAB | Refills: 0 | Status: SHIPPED | OUTPATIENT
Start: 2020-02-17 | End: 2020-05-13 | Stop reason: SDUPTHER

## 2020-02-19 ENCOUNTER — PATIENT OUTREACH (OUTPATIENT)
Dept: FAMILY MEDICINE CLINIC | Age: 55
End: 2020-02-19

## 2020-02-19 DIAGNOSIS — Z12.31 BREAST CANCER SCREENING BY MAMMOGRAM: Primary | ICD-10-CM

## 2020-02-19 DIAGNOSIS — Z12.11 COLON CANCER SCREENING: ICD-10-CM

## 2020-02-19 NOTE — PROGRESS NOTES
[facility] health screening:    Ms Junior Ojeda does have \"colon cancer that runs in my family. \" I've placed referral for [facility], patient's choice of practice, and informed her she should be hearing from that group to schedule in a few weeks. I've requested she sign a medical release with Dr. Lucia Prieto once she schedules her well woman visit so we can obtain the report from her office.

## 2020-02-24 DIAGNOSIS — I10 ESSENTIAL HYPERTENSION: ICD-10-CM

## 2020-02-25 RX ORDER — LISINOPRIL 20 MG/1
TABLET ORAL
Qty: 90 TAB | Refills: 0 | Status: SHIPPED | OUTPATIENT
Start: 2020-02-25 | End: 2020-05-13 | Stop reason: SDUPTHER

## 2020-05-13 ENCOUNTER — VIRTUAL VISIT (OUTPATIENT)
Dept: FAMILY MEDICINE CLINIC | Age: 55
End: 2020-05-13

## 2020-05-13 DIAGNOSIS — I25.10 ATHEROSCLEROSIS OF NATIVE CORONARY ARTERY OF NATIVE HEART WITHOUT ANGINA PECTORIS: ICD-10-CM

## 2020-05-13 DIAGNOSIS — E78.2 MIXED HYPERLIPIDEMIA: ICD-10-CM

## 2020-05-13 DIAGNOSIS — R93.89 ABNORMAL COMPUTED TOMOGRAPHY ANGIOGRAPHY (CTA): ICD-10-CM

## 2020-05-13 DIAGNOSIS — E78.5 DYSLIPIDEMIA: ICD-10-CM

## 2020-05-13 DIAGNOSIS — K21.9 GASTROESOPHAGEAL REFLUX DISEASE WITHOUT ESOPHAGITIS: ICD-10-CM

## 2020-05-13 DIAGNOSIS — R73.01 IMPAIRED FASTING BLOOD SUGAR: ICD-10-CM

## 2020-05-13 DIAGNOSIS — F41.9 ANXIETY: ICD-10-CM

## 2020-05-13 DIAGNOSIS — G89.29 OTHER CHRONIC PAIN: ICD-10-CM

## 2020-05-13 DIAGNOSIS — Z80.0 FAMILY HISTORY OF COLON CANCER: ICD-10-CM

## 2020-05-13 DIAGNOSIS — I10 ESSENTIAL HYPERTENSION: Primary | ICD-10-CM

## 2020-05-13 DIAGNOSIS — R42 DIZZINESS: ICD-10-CM

## 2020-05-13 DIAGNOSIS — G47.9 TROUBLE IN SLEEPING: ICD-10-CM

## 2020-05-13 DIAGNOSIS — K58.8 OTHER IRRITABLE BOWEL SYNDROME: ICD-10-CM

## 2020-05-13 DIAGNOSIS — R53.83 OTHER FATIGUE: ICD-10-CM

## 2020-05-13 RX ORDER — DICYCLOMINE HYDROCHLORIDE 20 MG/1
20 TABLET ORAL
Qty: 90 TAB | Refills: 1 | Status: SHIPPED | OUTPATIENT
Start: 2020-05-13 | End: 2021-03-12

## 2020-05-13 RX ORDER — LISINOPRIL 20 MG/1
20 TABLET ORAL DAILY
Qty: 90 TAB | Refills: 0 | Status: SHIPPED | OUTPATIENT
Start: 2020-05-13 | End: 2020-05-26

## 2020-05-13 RX ORDER — BUSPIRONE HYDROCHLORIDE 5 MG/1
5 TABLET ORAL 2 TIMES DAILY
Qty: 60 TAB | Refills: 0 | Status: SHIPPED | OUTPATIENT
Start: 2020-05-13 | End: 2020-06-11

## 2020-05-13 RX ORDER — OMEPRAZOLE 20 MG/1
20 CAPSULE, DELAYED RELEASE ORAL DAILY
Qty: 90 CAP | Refills: 0 | Status: SHIPPED | OUTPATIENT
Start: 2020-05-13 | End: 2020-08-14

## 2020-05-13 RX ORDER — SERTRALINE HYDROCHLORIDE 25 MG/1
25 TABLET, FILM COATED ORAL DAILY
Qty: 90 TAB | Refills: 0 | Status: SHIPPED | OUTPATIENT
Start: 2020-05-13 | End: 2020-08-14

## 2020-05-13 RX ORDER — INDOMETHACIN 25 MG/1
25 CAPSULE ORAL
Qty: 60 CAP | Refills: 0 | Status: SHIPPED | OUTPATIENT
Start: 2020-05-13 | End: 2020-11-11 | Stop reason: ALTCHOICE

## 2020-05-13 NOTE — PROGRESS NOTES
Dawn Leal is a 50 y.o. female who was seen by synchronous (real-time) audio-video technology on 5/13/2020. Consent: Dawn Leal, who was seen by synchronous (real-time) audio-video technology, and/or her healthcare decision maker, is aware that this patient-initiated, Telehealth encounter on 5/13/2020 is a billable service, with coverage as determined by her insurance carrier. She is aware that she may receive a bill and has provided verbal consent to proceed: Yes. Assessment & Plan:   Diagnoses and all orders for this visit:    Essential hypertension: Recent visit with cardiology vitals been stable. Currently she is asymptomatic. Will continue current plan. 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 Will be following the arm recommendations. Patient denies any chest pain during the virtual visit. No shortness of breath. She did have some dizziness but has been improved lately. Been eating on time and drinking water. During virtual visit she did not appear in any acute distress.  -     LIPID PANEL; Future  -     lisinopriL (PRINIVIL, ZESTRIL) 20 mg tablet; Take 1 Tab by mouth daily. , Normal, Disp-90 Tab, R-0    Impaired fasting blood sugar: Diet modification.  -     LIPID PANEL; Future    Dyslipidemia: On statin. No side effects. Diet modification.  -     LIPID PANEL; Future    Dizziness: Currently asymptomatic. Has been working with cardiology. Had a positive tilt table test.  He had adjusted nifedipine dose  by cardiologist.  Also had minimal calcium abnormality on CTA, strong family history his mother had a heart disease in 25s and abnormal stress test with atypical chest pain she had a cardiac cath done. Noted anomalous coronary arteries. Had a PFT fairly okay. For now will follow cardiology recommendations. Reviewed cardiology notes and questionable need for surgical correction.   Patient is aware of those symptoms and the results. -     METABOLIC PANEL, COMPREHENSIVE; Future    Anxiety. Lately been worse. She is taking Zoloft but said it is helping minimal.  Does not want to change medication or change the dose of Zoloft. She was asking for Xanax. I have discussed that needs to adjusted the SSRIs dose instead. She agreed to give a trial of BuSpar along with Zoloft. Discussed the medication side effects. We will follow-up next visit. Also she agrees to see the psychiatrist and possible counseling. We will do a referral.  She would like to go in person. For now I have advised her to start the medication and at least get an appointment as due to pandemic situation she can discuss with the specialist office regarding virtual visit option versus in person visit. -     TSH 3RD GENERATION; Future  -     LIPID PANEL; Future  -     METABOLIC PANEL, COMPREHENSIVE; Future  -     REFERRAL TO PSYCHIATRY  -     busPIRone (BUSPAR) 5 mg tablet; Take 1 Tab by mouth two (2) times a day., Normal, Disp-60 Tab, R-0  -     sertraline (ZOLOFT) 25 mg tablet; Take 1 Tab by mouth daily. , Normal, Disp-90 Tab, R-0    Gastroesophageal reflux disease without esophagitis: Fairly stable on symptomatic treatment  -     omeprazole (PRILOSEC) 20 mg capsule; Take 1 Cap by mouth daily. , Normal, Disp-90 Cap, R-0    Other fatigue: Lately getting worse. I will check labs. Follow-up after labs  -     CBC W/O DIFF; Future  -     TSH 3RD GENERATION; Future  -     VITAMIN D, 25 HYDROXY; Future    Trouble in sleeping: Has been always struggled. Trouble maintaining sleep. Sleep hygiene has been discussed. We will add BuSpar and see how she feels. Follow-up next visit  -     REFERRAL TO PSYCHIATRY      Family history of colon cancer    Atherosclerosis of native coronary artery of native heart without angina pectoris: Cath was done to rule out her chest pain, shortness of breath and strong family history. Noted anomalous coronary artery. For now will be following cardiology recommendations. 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 50% stenosis reported at origin on CTA        Mixed hyperlipidemia: On statin. Diet modification. Will continue current plan  Comments:  Advise starting Lipitor as LDL is high    Other chronic pain/ lower back on and off: Taking as needed indomethacin. Currently fairly stable. No loss of urine or bowel control. Will observe  -     indomethacin (INDOCIN) 25 mg capsule; Take 1 Cap by mouth two (2) times daily as needed for Gout or Pain., Normal, Disp-60 Cap, R-0    Other irritable bowel syndrome: Stable symptomatically. Given medication refill.  -     dicyclomine (BENTYL) 20 mg tablet; Take 1 Tab by mouth every six (6) hours as needed (abdominal cramps). , Normal, Disp-90 Tab, R-1    Patient understood and agreed with above plan. She was given instruction to do the mammogram, Pap smear, colonoscopy. She been due for all of the test.  She was strongly recommended to schedule them. She will do that. She been noncompliant patient since beginning. Discussed the importance of being compliant with instructions, medications. She agrees to follow that. 712  Subjective:   Vincent Wilson is a 50 y.o. female who was seen for No chief complaint on file. Done visit with Charlene Dos Santos. Had a dizziness, chest pain on and off, shortness of breath. She has a strong family history of coronary artery disease. She been noncompliant with the follow-ups and instructions since I started taking care of her. Finally she has seen the cardiology. During the work-up she had cardiac cath done which showed anomalous coronary artery. Her tilt table test was positive. Also abnormal stress test.  Currently she denies any anginal symptoms. During virtual visit she was sitting comfortable. Did not appear in any acute distress.    reviewed cardiology notes and recommendations. Discussed required for surgery as well. She is aware and will be following cardiology recommendations. Her dizziness, chest pain and shortness of breath has been stable lately. No concern at this time. Review prior labs and noted prediabetes. She is not much compliant with the diet and exercise. Discussed healthy lifestyle and she agrees to do so. History of hyperlipidemia. On statin. No side effects. History of irritable bowel syndrome. Currently fairly stable bowel movements. No abdominal pain. Taking Bentyl as needed. No appetite or weight changes. She is due for colonoscopy, mammogram, Pap smear. She will schedule all the preventive test.   Lab Results   Component Value Date/Time    WBC 10.3 01/27/2020 02:29 PM    HGB 13.9 01/27/2020 02:29 PM    HCT 44.4 01/27/2020 02:29 PM    PLATELET 079 60/59/1762 02:29 PM    MCV 89.9 01/27/2020 02:29 PM     Lab Results   Component Value Date/Time    Sodium 140 01/27/2020 02:29 PM    Potassium 4.1 01/27/2020 02:29 PM    Chloride 107 01/27/2020 02:29 PM    CO2 25 01/27/2020 02:29 PM    Anion gap 8 01/27/2020 02:29 PM    Glucose 144 (H) 01/27/2020 02:29 PM    BUN 18 01/27/2020 02:29 PM    Creatinine 0.74 01/27/2020 02:29 PM    BUN/Creatinine ratio 24 (H) 01/27/2020 02:29 PM    GFR est AA >60 01/27/2020 02:29 PM    GFR est non-AA >60 01/27/2020 02:29 PM    Calcium 9.1 01/27/2020 02:29 PM    Bilirubin, total 0.4 01/27/2020 02:29 PM    AST (SGOT) 15 01/27/2020 02:29 PM    Alk.  phosphatase 140 (H) 01/27/2020 02:29 PM    Protein, total 7.8 01/27/2020 02:29 PM    Albumin 3.8 01/27/2020 02:29 PM    Globulin 4.0 01/27/2020 02:29 PM    A-G Ratio 1.0 01/27/2020 02:29 PM    ALT (SGPT) 25 01/27/2020 02:29 PM     Lab Results   Component Value Date/Time    Cholesterol, total 232 (H) 05/16/2019 10:43 AM    HDL Cholesterol 45 05/16/2019 10:43 AM    LDL, calculated 138.2 (H) 05/16/2019 10:43 AM    VLDL, calculated 48.8 05/16/2019 10:43 AM    Triglyceride 244 (H) 05/16/2019 10:43 AM    CHOL/HDL Ratio 5.2 (H) 05/16/2019 10:43 AM     Lab Results   Component Value Date/Time    TSH 1.55 05/16/2019 10:43 AM     Lab Results   Component Value Date/Time    Hemoglobin A1c 6.2 (H) 05/16/2019 10:43 AM       Prior to Admission medications    Medication Sig Start Date End Date Taking? Authorizing Provider   busPIRone (BUSPAR) 5 mg tablet Take 1 Tab by mouth two (2) times a day. 5/13/20  Yes Love Simpson MD   lisinopriL (PRINIVIL, ZESTRIL) 20 mg tablet Take 1 Tab by mouth daily. 5/13/20  Yes Love Simpson MD   omeprazole (PRILOSEC) 20 mg capsule Take 1 Cap by mouth daily. 5/13/20  Yes Love Simpson MD   sertraline (ZOLOFT) 25 mg tablet Take 1 Tab by mouth daily. 5/13/20  Yes Love Simpson MD   indomethacin (INDOCIN) 25 mg capsule Take 1 Cap by mouth two (2) times daily as needed for Gout or Pain. 5/13/20  Yes Love Simpson MD   dicyclomine (BENTYL) 20 mg tablet Take 1 Tab by mouth every six (6) hours as needed (abdominal cramps). 5/13/20  Yes Love Simpson MD   NIFEdipine ER (PROCARDIA XL) 30 mg ER tablet Take 1 Tab by mouth daily. 10/16/19  Yes Glenis Foster MD   metoprolol succinate (TOPROL-XL) 25 mg XL tablet Take 1 Tab by mouth daily. 10/16/19  Yes Glenis Foster MD   atorvastatin (LIPITOR) 10 mg tablet Take 1 Tab by mouth daily. 10/16/19  Yes Irvin Kc NP   Cetirizine (ZYRTEC) 10 mg cap Take 10 mg by mouth daily.  5/20/19  Yes Love Simpson MD   indomethacin (INDOCIN) 50 mg capsule take 1 capsule by mouth twice a day if needed 4/28/20 5/13/20  Love Simpson MD   lisinopril (PRINIVIL, ZESTRIL) 20 mg tablet take 1 tablet by mouth once daily 2/25/20 5/13/20  Love Simpson MD   sertraline (ZOLOFT) 25 mg tablet take 1 tablet by mouth once daily 2/17/20 5/13/20  Love Simpson MD   omeprazole (PRILOSEC) 20 mg capsule take 1 capsule by mouth once daily 2/17/20 5/13/20  Love Simpson MD   ondansetron (ZOFRAN ODT) 4 mg disintegrating tablet Take 1 Tab by mouth every eight (8) hours as needed for Nausea or Vomiting. 1/27/20 5/13/20  Solitario Sosa PA-C   dicyclomine (BENTYL) 20 mg tablet Take 1 Tab by mouth every six (6) hours as needed (abdominal cramps). 5/20/19 5/13/20  Fuad Ortiz MD     Allergies   Allergen Reactions    Codeine Other (comments)    Doxycycline Other (comments)    Morphine Other (comments)     Chest burning    Morphine Other (comments)     \"chest burning\"       Patient Active Problem List    Diagnosis Date Noted    Anomalous coronary artery origin 12/04/2019    Atypical angina ([facility] [address] 10/30/2019    Abnormal nuclear stress test 10/30/2019    Essential hypertension 09/04/2019    Gastroesophageal reflux disease without esophagitis 09/04/2019    Anxiety 09/04/2019    Prediabetes 06/29/2015    Chronic back pain 03/13/2015    Anxiety 03/13/2015    Hypertension 03/13/2015    IBS (irritable bowel syndrome) 03/13/2015    Thyroid condition/ not on any medication 03/13/2015    Environmental allergies 05/15/2014    Sinusitis 05/15/2014     Current Outpatient Medications   Medication Sig Dispense Refill    busPIRone (BUSPAR) 5 mg tablet Take 1 Tab by mouth two (2) times a day. 60 Tab 0    lisinopriL (PRINIVIL, ZESTRIL) 20 mg tablet Take 1 Tab by mouth daily. 90 Tab 0    omeprazole (PRILOSEC) 20 mg capsule Take 1 Cap by mouth daily. 90 Cap 0    sertraline (ZOLOFT) 25 mg tablet Take 1 Tab by mouth daily. 90 Tab 0    indomethacin (INDOCIN) 25 mg capsule Take 1 Cap by mouth two (2) times daily as needed for Gout or Pain. 60 Cap 0    dicyclomine (BENTYL) 20 mg tablet Take 1 Tab by mouth every six (6) hours as needed (abdominal cramps). 90 Tab 1    NIFEdipine ER (PROCARDIA XL) 30 mg ER tablet Take 1 Tab by mouth daily. 30 Tab 6    metoprolol succinate (TOPROL-XL) 25 mg XL tablet Take 1 Tab by mouth daily. 30 Tab 6    atorvastatin (LIPITOR) 10 mg tablet Take 1 Tab by mouth daily.  90 Tab 2    Cetirizine (ZYRTEC) 10 mg cap Take 10 mg by mouth daily. 30 Cap 2     Allergies   Allergen Reactions    Codeine Other (comments)    Doxycycline Other (comments)    Morphine Other (comments)     Chest burning    Morphine Other (comments)     \"chest burning\"     Past Medical History:   Diagnosis Date    Diabetes ([address]     Pre diabetes    Essential hypertension     GERD (gastroesophageal reflux disease)     Glaucoma     Hyperlipidemia     Hypertension     Nausea & vomiting     nausea       ROS: see HPI       Objective:     Visit Vitals  LMP 01/28/2015      General: alert, cooperative, no distress   Mental  status: normal mood, behavior, speech, dress, motor activity, and thought processes, able to follow commands   HENT: NCAT   Neck: no visualized mass   Resp: no respiratory distress   Neuro: no gross deficits   Skin: no discoloration or lesions of concern on visible areas   Psychiatric: normal affect, consistent with stated mood, no evidence of hallucinations     Additional exam findings: We discussed the expected course, resolution and complications of the diagnosis(es) in detail. Medication risks, benefits, costs, interactions, and alternatives were discussed as indicated. I advised her to contact the office if her condition worsens, changes or fails to improve as anticipated. She expressed understanding with the diagnosis(es) and plan. Bulmaro Jamil is a 50 y.o. female who was evaluated by a video visit encounter for concerns as above. Patient identification was verified prior to start of the visit. A caregiver was present when appropriate. Due to this being a TeleHealth encounter (During [place] [address] public [facility] emergency), evaluation of the following organ systems was limited: Vitals/Constitutional/EENT/Resp/CV/GI//MS/Neuro/Skin/Heme-Lymph-Imm.   Pursuant to the emergency declaration under the [facility] [address] waiver authority and the [place] Resources and Response Supplemental Appropriations Act, this Virtual  Visit was conducted, with patient's (and/or legal guardian's) consent, to reduce the patient's risk of exposure to COVID-19 and provide necessary medical care. Services were provided through a video synchronous discussion virtually to substitute for in-person clinic visit. Patient and provider were located at their individual homes.       Gale Chaves MD

## 2020-05-25 DIAGNOSIS — I10 ESSENTIAL HYPERTENSION: ICD-10-CM

## 2020-05-26 ENCOUNTER — PATIENT OUTREACH (OUTPATIENT)
Dept: FAMILY MEDICINE CLINIC | Age: 55
End: 2020-05-26

## 2020-05-26 RX ORDER — LISINOPRIL 20 MG/1
TABLET ORAL
Qty: 90 TAB | Refills: 0 | Status: SHIPPED | OUTPATIENT
Start: 2020-05-26 | End: 2020-11-27

## 2020-05-26 NOTE — PROGRESS NOTES
[facility] health screening:    Reminded to provide signed medical release during her cervical cancer screening with dr. Johnson Linker to have report sent to Dr. Charissa Mayers please. Closed this episode of care.

## 2020-05-29 DIAGNOSIS — I10 ESSENTIAL HYPERTENSION: ICD-10-CM

## 2020-05-29 RX ORDER — METOPROLOL SUCCINATE 25 MG/1
TABLET, EXTENDED RELEASE ORAL
Qty: 30 TAB | Refills: 6 | Status: SHIPPED | OUTPATIENT
Start: 2020-05-29 | End: 2020-12-29

## 2020-05-29 RX ORDER — NIFEDIPINE 30 MG/1
TABLET, EXTENDED RELEASE ORAL
Qty: 30 TAB | Refills: 6 | Status: SHIPPED | OUTPATIENT
Start: 2020-05-29 | End: 2020-12-29

## 2020-06-11 ENCOUNTER — VIRTUAL VISIT (OUTPATIENT)
Dept: CARDIOLOGY CLINIC | Age: 55
End: 2020-06-11

## 2020-06-11 DIAGNOSIS — Q24.5 ANOMALOUS CORONARY ARTERY ORIGIN: Primary | ICD-10-CM

## 2020-06-11 DIAGNOSIS — I10 HYPERTENSION, UNSPECIFIED TYPE: ICD-10-CM

## 2020-06-11 DIAGNOSIS — E78.2 MIXED HYPERLIPIDEMIA: ICD-10-CM

## 2020-06-11 DIAGNOSIS — R06.02 SOB (SHORTNESS OF BREATH): ICD-10-CM

## 2020-06-11 DIAGNOSIS — I10 ESSENTIAL HYPERTENSION: ICD-10-CM

## 2020-06-11 NOTE — PROGRESS NOTES
1. Have you been to the ER, urgent care clinic since your last visit? Hospitalized since your last visit?     no    2. Have you seen or consulted any other health care providers outside of the [address] [place] [address] since your last visit? Include any pap smears or colon screening.       No

## 2020-06-11 NOTE — PROGRESS NOTES
Consent: Afsaneh Schmidt, who was seen by synchronous (real-time) audio-video technology, and/or her healthcare decision maker, is aware that this patient-initiated, Telehealth encounter on 6/11/2020 is a billable service, with coverage as determined by her insurance carrier. She is aware that she may receive a bill and has provided verbal consent to proceed: Yes. Subjective:   Afsaneh Schmidt is a 50 y.o. female who was seen for Hypertension (6 m post pft )    Patient seen today for virtual visit follow-up  On follow up patient denies any chest pains,sob, palpitation or other significant symptoms. Family History   Problem Relation Age of Onset    Heart Attack Mother     Coronary Artery Disease Mother     Heart Attack Father     Coronary Artery Disease Brother     Cancer Maternal Grandmother     Heart Attack Paternal Grandfather      Past Surgical History:   Procedure Laterality Date    HX CHOLECYSTECTOMY      HX CHOLECYSTECTOMY  2003    HX DILATION AND CURETTAGE  8/14    hysterectomy    HX HEENT      glaucoma    HX ORTHOPAEDIC      back    HX PARTIAL HYSTERECTOMY       Allergies   Allergen Reactions    Codeine Other (comments)    Doxycycline Other (comments)    Morphine Other (comments)     Chest burning    Morphine Other (comments)     \"chest burning\"       Current Outpatient Medications:     metoprolol succinate (TOPROL-XL) 25 mg XL tablet, take 1 tablet by mouth once daily, Disp: 30 Tab, Rfl: 6    NIFEdipine ER (PROCARDIA XL) 30 mg ER tablet, take 1 tablet by mouth once daily, Disp: 30 Tab, Rfl: 6    lisinopriL (PRINIVIL, ZESTRIL) 20 mg tablet, take 1 tablet by mouth once daily, Disp: 90 Tab, Rfl: 0    omeprazole (PRILOSEC) 20 mg capsule, Take 1 Cap by mouth daily. , Disp: 90 Cap, Rfl: 0    sertraline (ZOLOFT) 25 mg tablet, Take 1 Tab by mouth daily. , Disp: 90 Tab, Rfl: 0    indomethacin (INDOCIN) 25 mg capsule, Take 1 Cap by mouth two (2) times daily as needed for Gout or Pain., Disp: 60 Cap, Rfl: 0    dicyclomine (BENTYL) 20 mg tablet, Take 1 Tab by mouth every six (6) hours as needed (abdominal cramps). , Disp: 90 Tab, Rfl: 1    atorvastatin (LIPITOR) 10 mg tablet, Take 1 Tab by mouth daily. , Disp: 90 Tab, Rfl: 2    Cetirizine (ZYRTEC) 10 mg cap, Take 10 mg by mouth daily. , Disp: 30 Cap, Rfl: 2  Allergies   Allergen Reactions    Codeine Other (comments)    Doxycycline Other (comments)    Morphine Other (comments)     Chest burning    Morphine Other (comments)     \"chest burning\"         Review of Systems   Review of Systems   Constitutional: Negative for chills and fever. HENT: Negative for nosebleeds. Eyes: Negative for blurred vision and double vision. Respiratory: See HPI   Cardiovascular: See HPI  Gastrointestinal: Negative for abdominal pain, heartburn, nausea and vomiting. Musculoskeletal: Negative for myalgias. Skin: Negative for rash. Neurological: Negative for dizziness, weakness and headaches. Endo/Heme/Allergies: Does not bruise/bleed easily. Objective:     Visit Vitals  LMP 01/28/2015      General: alert, cooperative, no distress   Mental  status: normal mood, behavior, speech, dress, motor activity, and thought processes, able to follow commands   HENT: NCAT   Neck: no visualized mass,No JVD   Resp: no respiratory distress   Neuro: no gross deficits   Skin: no discoloration or Bruise on visible areas   Psychiatric: normal affect, consistent with stated mood, no evidence of hallucinations     Additional exam findings:     Extremities:No edema     Pertinent LAB and reports  I have reviewed available  pertinent notes, labs and reports and included in current evaluation and management of this patient. Assessment & Plan:   Diagnoses and all orders for this visit:    1. Anomalous coronary artery origin  Comments:  Stable symptoms controlled    2. Essential hypertension  Comments:  Continue treatment monitor    3.  Mixed hyperlipidemia  Comments:  Continue treatment lab with PCP  Orders:  -     HEPATIC FUNCTION PANEL; Future  -     LIPID PANEL; Future    4. Hypertension, unspecified type  Comments:  Stable monitor    5. SOB (shortness of breath)  Comments:  Mild restrictive disease on PFT. Discussed weight loss        1/9/2020  7:47 PM - Debe Clarity, DO     Impression     Pulmonary Function Test    Flows:  Normal flows    Volumes:  FRC, RV and ERV are reduced    Flow Volume Loop:  Suggestive of mild restriction      Diffusion:  Normal Diffusion Capacity      Impression:  Mild restrictive ventilatory defect with normal spirometry and diffusion capacity. Comment:  See technicians comments. 6/2020  Cardiac status stable. Mild restrictive disease on PFT likely related to obesity continue diet exercise weight loss monitor anginal symptoms are stable  Follow-up and Dispositions    · Return in about 6 months (around 12/11/2020). 712  We discussed the expected course, resolution and complications of the diagnosis(es) in detail. Medication risks, benefits, costs, interactions, and alternatives were discussed as indicated. I advised her to contact the office if her condition worsens, changes or fails to improve as anticipated. She expressed understanding with the diagnosis(es) and plan. Zainab Gil is a 50 y.o. female being evaluated by a video visit encounter for concerns as above. A caregiver was present when appropriate. Due to this being a TeleHealth encounter (During [facility] public health emergency), evaluation of the following organ systems was limited: Vitals/Constitutional/EENT/Resp/CV/GI//MS/Neuro/Skin/Heme-Lymph-Imm.   Pursuant to the emergency declaration under the [facility], [address] waiver authority and the [employer] Act, this Virtual  Visit was conducted, with patient's (and/or legal guardian's) consent, to reduce the patient's risk of exposure to COVID-19 and provide necessary medical care. Services were provided through a video synchronous discussion virtually to substitute for in-person clinic visit. I was at home while conducting this encounter.         Misha Montano MD

## 2020-08-13 DIAGNOSIS — K21.9 GASTROESOPHAGEAL REFLUX DISEASE WITHOUT ESOPHAGITIS: ICD-10-CM

## 2020-08-13 DIAGNOSIS — F41.9 ANXIETY: ICD-10-CM

## 2020-08-14 RX ORDER — SERTRALINE HYDROCHLORIDE 25 MG/1
TABLET, FILM COATED ORAL
Qty: 90 TAB | Refills: 1 | Status: SHIPPED | OUTPATIENT
Start: 2020-08-14 | End: 2021-02-01

## 2020-08-14 RX ORDER — OMEPRAZOLE 20 MG/1
CAPSULE, DELAYED RELEASE ORAL
Qty: 90 CAP | Refills: 1 | Status: SHIPPED | OUTPATIENT
Start: 2020-08-14 | End: 2021-02-01

## 2020-08-27 NOTE — DISCHARGE INSTRUCTIONS
Patient Education        Tilt Table Test: About This Test  What happens after the test?  · Your heart rate and blood pressure will be checked before you go home. · You may need to have someone drive you home after the test.  · You can probably go back to your usual activities right away. But some people feel a little tired or nauseated  Follow-up care is a key part of your treatment and safety. Be sure to make and go to all appointments, and call your doctor if you are having problems. It's also a good idea to keep a list of the medicines you take. Ask your doctor when you can expect to have your test results. Where can you learn more? Go to http://arden-emiliano.info/. Enter L387 in the search box to learn more about \"Tilt Table Test: About This Test.\"  Current as of: April 9, 2019  Content Version: 12.2  © 7523-6985 [employer]. Care instructions adapted under license by [employer] (which disclaims liability or warranty for this information). If you have questions about a medical condition or this instruction, always ask your healthcare professional. [address] any warranty or liability for your use of this information. chest pain

## 2020-10-03 DIAGNOSIS — E78.2 MIXED HYPERLIPIDEMIA: ICD-10-CM

## 2020-10-03 DIAGNOSIS — I25.10 ATHEROSCLEROSIS OF NATIVE CORONARY ARTERY OF NATIVE HEART WITHOUT ANGINA PECTORIS: ICD-10-CM

## 2020-10-05 RX ORDER — ATORVASTATIN CALCIUM 10 MG/1
TABLET, FILM COATED ORAL
Qty: 90 TAB | Refills: 2 | Status: SHIPPED | OUTPATIENT
Start: 2020-10-05 | End: 2021-08-27 | Stop reason: SDUPTHER

## 2020-11-11 DIAGNOSIS — G89.29 OTHER CHRONIC PAIN: ICD-10-CM

## 2020-11-11 RX ORDER — INDOMETHACIN 50 MG/1
CAPSULE ORAL
Qty: 15 CAP | Refills: 0 | Status: SHIPPED | OUTPATIENT
Start: 2020-11-11 | End: 2021-04-16

## 2020-11-26 DIAGNOSIS — I10 ESSENTIAL HYPERTENSION: ICD-10-CM

## 2020-11-27 RX ORDER — LISINOPRIL 20 MG/1
TABLET ORAL
Qty: 90 TAB | Refills: 0 | Status: SHIPPED | OUTPATIENT
Start: 2020-11-27 | End: 2021-03-02

## 2020-12-29 DIAGNOSIS — I10 ESSENTIAL HYPERTENSION: ICD-10-CM

## 2020-12-29 RX ORDER — NIFEDIPINE 30 MG/1
TABLET, EXTENDED RELEASE ORAL
Qty: 30 TAB | Refills: 6 | Status: SHIPPED | OUTPATIENT
Start: 2020-12-29 | End: 2021-07-23

## 2020-12-29 RX ORDER — METOPROLOL SUCCINATE 25 MG/1
TABLET, EXTENDED RELEASE ORAL
Qty: 30 TAB | Refills: 6 | Status: SHIPPED | OUTPATIENT
Start: 2020-12-29 | End: 2021-07-23

## 2021-01-31 DIAGNOSIS — K21.9 GASTROESOPHAGEAL REFLUX DISEASE WITHOUT ESOPHAGITIS: ICD-10-CM

## 2021-01-31 DIAGNOSIS — F41.9 ANXIETY: ICD-10-CM

## 2021-02-01 RX ORDER — SERTRALINE HYDROCHLORIDE 25 MG/1
TABLET, FILM COATED ORAL
Qty: 30 TAB | Refills: 0 | Status: SHIPPED | OUTPATIENT
Start: 2021-02-01 | End: 2021-03-12

## 2021-02-01 RX ORDER — OMEPRAZOLE 20 MG/1
CAPSULE, DELAYED RELEASE ORAL
Qty: 30 CAP | Refills: 1 | Status: SHIPPED | OUTPATIENT
Start: 2021-02-01 | End: 2021-04-12

## 2021-03-02 DIAGNOSIS — I10 ESSENTIAL HYPERTENSION: ICD-10-CM

## 2021-03-02 RX ORDER — LISINOPRIL 20 MG/1
TABLET ORAL
Qty: 90 TAB | Refills: 0 | Status: SHIPPED | OUTPATIENT
Start: 2021-03-02 | End: 2021-06-01 | Stop reason: SDUPTHER

## 2021-03-12 ENCOUNTER — HOSPITAL ENCOUNTER (OUTPATIENT)
Dept: LAB | Age: 56
Discharge: HOME | End: 2021-03-12
Payer: COMMERCIAL

## 2021-03-12 ENCOUNTER — TELEPHONE (OUTPATIENT)
Dept: CARDIOLOGY CLINIC | Age: 56
End: 2021-03-12

## 2021-03-12 DIAGNOSIS — R42 DIZZINESS: ICD-10-CM

## 2021-03-12 DIAGNOSIS — R53.83 OTHER FATIGUE: ICD-10-CM

## 2021-03-12 DIAGNOSIS — E78.2 MIXED HYPERLIPIDEMIA: ICD-10-CM

## 2021-03-12 DIAGNOSIS — R73.01 IMPAIRED FASTING BLOOD SUGAR: ICD-10-CM

## 2021-03-12 DIAGNOSIS — I25.10 ATHEROSCLEROSIS OF NATIVE CORONARY ARTERY OF NATIVE HEART WITHOUT ANGINA PECTORIS: ICD-10-CM

## 2021-03-12 DIAGNOSIS — I10 ESSENTIAL HYPERTENSION: Primary | ICD-10-CM

## 2021-03-12 DIAGNOSIS — E78.5 DYSLIPIDEMIA: ICD-10-CM

## 2021-03-12 DIAGNOSIS — I10 ESSENTIAL HYPERTENSION: ICD-10-CM

## 2021-03-12 DIAGNOSIS — F41.9 ANXIETY: ICD-10-CM

## 2021-03-12 LAB
25(OH)D3 SERPL-MCNC: 11.1 NG/ML (ref 30–100)
ALBUMIN SERPL-MCNC: 3.8 G/DL (ref 3.4–5)
ALBUMIN SERPL-MCNC: 3.8 G/DL (ref 3.4–5)
ALBUMIN/GLOB SERPL: 1 {RATIO} (ref 0.8–1.7)
ALBUMIN/GLOB SERPL: 1 {RATIO} (ref 0.8–1.7)
ALP SERPL-CCNC: 141 U/L (ref 45–117)
ALP SERPL-CCNC: 145 U/L (ref 45–117)
ALT SERPL-CCNC: 28 U/L (ref 13–56)
ALT SERPL-CCNC: 29 U/L (ref 13–56)
ANION GAP SERPL CALC-SCNC: 7 MMOL/L (ref 3–18)
AST SERPL-CCNC: 12 U/L (ref 10–38)
AST SERPL-CCNC: 13 U/L (ref 10–38)
BILIRUB DIRECT SERPL-MCNC: 0.1 MG/DL (ref 0–0.2)
BILIRUB SERPL-MCNC: 0.2 MG/DL (ref 0.2–1)
BILIRUB SERPL-MCNC: 0.3 MG/DL (ref 0.2–1)
BUN SERPL-MCNC: 12 MG/DL (ref 7–18)
BUN/CREAT SERPL: 18 (ref 12–20)
CALCIUM SERPL-MCNC: 9.3 MG/DL (ref 8.5–10.1)
CHLORIDE SERPL-SCNC: 107 MMOL/L (ref 100–111)
CHOLEST SERPL-MCNC: 209 MG/DL
CHOLEST SERPL-MCNC: 216 MG/DL
CO2 SERPL-SCNC: 30 MMOL/L (ref 21–32)
CREAT SERPL-MCNC: 0.68 MG/DL (ref 0.6–1.3)
ERYTHROCYTE [DISTWIDTH] IN BLOOD BY AUTOMATED COUNT: 14.6 % (ref 11.6–14.5)
EST. AVERAGE GLUCOSE BLD GHB EST-MCNC: 143 MG/DL
GLOBULIN SER CALC-MCNC: 4 G/DL (ref 2–4)
GLOBULIN SER CALC-MCNC: 4 G/DL (ref 2–4)
GLUCOSE SERPL-MCNC: 110 MG/DL (ref 74–99)
HBA1C MFR BLD: 6.6 % (ref 4.2–5.6)
HCT VFR BLD AUTO: 41.4 % (ref 35–45)
HDLC SERPL-MCNC: 46 MG/DL (ref 40–60)
HDLC SERPL-MCNC: 46 MG/DL (ref 40–60)
HDLC SERPL: 4.5 {RATIO} (ref 0–5)
HDLC SERPL: 4.7 {RATIO} (ref 0–5)
HGB BLD-MCNC: 13.2 G/DL (ref 12–16)
LDLC SERPL CALC-MCNC: 100.8 MG/DL (ref 0–100)
LDLC SERPL CALC-MCNC: 106.6 MG/DL (ref 0–100)
LIPID PROFILE,FLP: ABNORMAL
LIPID PROFILE,FLP: ABNORMAL
MCH RBC QN AUTO: 28.3 PG (ref 24–34)
MCHC RBC AUTO-ENTMCNC: 31.9 G/DL (ref 31–37)
MCV RBC AUTO: 88.7 FL (ref 74–97)
PLATELET # BLD AUTO: 335 K/UL (ref 135–420)
PMV BLD AUTO: 10.7 FL (ref 9.2–11.8)
POTASSIUM SERPL-SCNC: 4.4 MMOL/L (ref 3.5–5.5)
PROT SERPL-MCNC: 7.8 G/DL (ref 6.4–8.2)
PROT SERPL-MCNC: 7.8 G/DL (ref 6.4–8.2)
RBC # BLD AUTO: 4.67 M/UL (ref 4.2–5.3)
SODIUM SERPL-SCNC: 144 MMOL/L (ref 136–145)
TRIGL SERPL-MCNC: 311 MG/DL (ref ?–150)
TRIGL SERPL-MCNC: 317 MG/DL (ref ?–150)
TSH SERPL DL<=0.05 MIU/L-ACNC: 1.64 UIU/ML (ref 0.36–3.74)
VLDLC SERPL CALC-MCNC: 62.2 MG/DL
VLDLC SERPL CALC-MCNC: 63.4 MG/DL
WBC # BLD AUTO: 8.2 K/UL (ref 4.6–13.2)

## 2021-03-12 PROCEDURE — 36415 COLL VENOUS BLD VENIPUNCTURE: CPT

## 2021-03-12 PROCEDURE — 84443 ASSAY THYROID STIM HORMONE: CPT

## 2021-03-12 PROCEDURE — 80076 HEPATIC FUNCTION PANEL: CPT

## 2021-03-12 PROCEDURE — 80061 LIPID PANEL: CPT

## 2021-03-12 PROCEDURE — 82306 VITAMIN D 25 HYDROXY: CPT

## 2021-03-12 PROCEDURE — 85027 COMPLETE CBC AUTOMATED: CPT

## 2021-03-12 PROCEDURE — 83036 HEMOGLOBIN GLYCOSYLATED A1C: CPT

## 2021-03-12 PROCEDURE — 80053 COMPREHEN METABOLIC PANEL: CPT

## 2021-03-12 RX ORDER — FENOFIBRATE 145 MG/1
145 TABLET, COATED ORAL
Qty: 90 TAB | Refills: 0 | Status: SHIPPED | OUTPATIENT
Start: 2021-03-12 | End: 2021-05-05

## 2021-03-12 RX ORDER — ERGOCALCIFEROL 1.25 MG/1
50000 CAPSULE ORAL
Qty: 12 CAP | Refills: 0 | Status: SHIPPED | OUTPATIENT
Start: 2021-03-12 | End: 2021-05-05

## 2021-03-12 NOTE — TELEPHONE ENCOUNTER
Can you put new order in for her Hepatic Panel.  He ordered it on her 2020 visit and she said it has

## 2021-03-12 NOTE — PROGRESS NOTES
Patient has history of noncompliance. Please advise patient to keep the follow-up appointment to discuss the blood results in detail. A1c showed in diabetic range. Need to discuss to start medication. Meantime I asked patient to start the Drisdol as vitamin D was low as well. Elevated triglyceride and she needs to start the TriCor. We will send TriCor. Hold the statin. Again diabetic medication start will be discussed during the follow-up visit.

## 2021-03-15 ENCOUNTER — TELEPHONE (OUTPATIENT)
Dept: CARDIOLOGY CLINIC | Age: 56
End: 2021-03-15

## 2021-03-15 NOTE — PROGRESS NOTES
High triglyceride continue with low-carb diet. Continue fenofibrate.   Add fish oil 1 capsule twice a day

## 2021-03-15 NOTE — TELEPHONE ENCOUNTER
----- Message from Ai Marte MD sent at 3/15/2021  7:42 AM EDT -----  High triglyceride continue with low-carb diet. Continue fenofibrate.   Add fish oil 1 capsule twice a day

## 2021-03-15 NOTE — TELEPHONE ENCOUNTER
Called and spoke to patient per Dr. Charlotte Lynn, lab reviewed High triglyceride continue with low carb diet. Continue fenofibrate. Add fish oil 1 capsule BID. She voices understanding and acceptance of this advice and will call back if any further questions or concerns.

## 2021-03-16 ENCOUNTER — OFFICE VISIT (OUTPATIENT)
Dept: FAMILY MEDICINE CLINIC | Age: 56
End: 2021-03-16
Payer: COMMERCIAL

## 2021-03-16 VITALS
WEIGHT: 224 LBS | RESPIRATION RATE: 16 BRPM | SYSTOLIC BLOOD PRESSURE: 126 MMHG | HEIGHT: 66 IN | TEMPERATURE: 97.6 F | DIASTOLIC BLOOD PRESSURE: 88 MMHG | BODY MASS INDEX: 36 KG/M2 | HEART RATE: 84 BPM | OXYGEN SATURATION: 97 %

## 2021-03-16 DIAGNOSIS — K21.9 GASTROESOPHAGEAL REFLUX DISEASE WITHOUT ESOPHAGITIS: ICD-10-CM

## 2021-03-16 DIAGNOSIS — E04.9 ENLARGED THYROID: ICD-10-CM

## 2021-03-16 DIAGNOSIS — R94.39 ABNORMAL NUCLEAR STRESS TEST: ICD-10-CM

## 2021-03-16 DIAGNOSIS — I10 ESSENTIAL HYPERTENSION: Primary | ICD-10-CM

## 2021-03-16 DIAGNOSIS — E55.9 VITAMIN D DEFICIENCY: ICD-10-CM

## 2021-03-16 DIAGNOSIS — E11.9 DIABETES MELLITUS, NEW ONSET (HCC): ICD-10-CM

## 2021-03-16 DIAGNOSIS — Z12.11 SCREENING FOR COLON CANCER: ICD-10-CM

## 2021-03-16 DIAGNOSIS — E78.1 HYPERTRIGLYCERIDEMIA: ICD-10-CM

## 2021-03-16 DIAGNOSIS — F41.9 ANXIETY: ICD-10-CM

## 2021-03-16 PROBLEM — I20.8 ATYPICAL ANGINA (HCC): Status: RESOLVED | Noted: 2019-10-30 | Resolved: 2021-03-16

## 2021-03-16 PROCEDURE — 99214 OFFICE O/P EST MOD 30 MIN: CPT | Performed by: FAMILY MEDICINE

## 2021-03-16 RX ORDER — METFORMIN HYDROCHLORIDE 500 MG/1
500 TABLET ORAL
Qty: 90 TAB | Refills: 0 | Status: SHIPPED | OUTPATIENT
Start: 2021-03-16 | End: 2021-05-05

## 2021-03-16 NOTE — PROGRESS NOTES
Chief Complaint   Patient presents with    Diabetes     New onset from labs    Hypertension    Cholesterol Problem     Dr. Teri Crowe advised to stay on Statin- please clarify    GERD    Anxiety    Results     1. Have you been to the ER, urgent care clinic since your last visit? Hospitalized since your last visit? No    2. Have you seen or consulted any other health care providers outside of the [address] [place] [address] since your last visit? Include any pap smears or colon screening.  No

## 2021-03-16 NOTE — PROGRESS NOTES
HISTORY OF PRESENT ILLNESS  Adalid Bloom is a 50 y.o. female. HPI: Here for follow-up and lab result discussion. Noncompliance with appointments. Also been noncompliant with the diet modification and lifestyle modification. Admits that she has been eating high carb diet, chips, ice creams etc.  Reviewed lab results. Elevated A1c around 6.6. Not on any medication. New onset diabetes. Discussed the importance of well-controlled diabetes and its complications. Agreed to start medication. Discussed the Metformin and its side effects. Follow-up next visit. Again discussed the high BMI. Importance of diet modification and importance of lifestyle modification and weight loss. Noted elevated triglyceride. Given TriCor. She has not started taking it yet. Discussed again importance of compliance with medications appointments and instructions. Low vitamin D. Given Drisdol weekly. Has not started that yet. Advised to start 2000 units of vitamin D OTC once she is done with the Drisdol. No unusual fatigue. Recent thyroid function within normal limit. On and off feels lump around the thyroid gland. Will obtain the ultrasound as mildly enlarged palpable thyroid gland. No trouble swallowing or change in voice. Lately had weight gain but also noncompliance with diet modification. History of hypertension. Vitals been stable. Asymptomatic. Taking medications as prescribed. No side effects. Denies any headache, dizziness, no chest pain or trouble breathing, no arm or leg weakness. No nausea or vomiting, no weight or appetite changes, no mood changes . No urine or bowel complains, no palpitation, no diaphoresis. No abdominal pain. No cold or cough. No leg swelling. No fever. No sleep trouble.      Visit Vitals  /88 (BP 1 Location: Left arm, BP Patient Position: Sitting, BP Cuff Size: Adult)   Pulse 84   Temp 97.6 °F (36.4 °C) (Temporal)   Resp 16   Ht 5' 6\" (1.676 m)   Wt 224 lb (101.6 kg) SpO2 97%   BMI 36.15 kg/m²     Review medication list, vitals, problem list,allergies. Lab Results   Component Value Date/Time    WBC 8.2 03/12/2021 12:34 PM    HGB 13.2 03/12/2021 12:34 PM    HCT 41.4 03/12/2021 12:34 PM    PLATELET 104 99/27/6434 12:34 PM    MCV 88.7 03/12/2021 12:34 PM     Lab Results   Component Value Date/Time    Sodium 144 03/12/2021 12:34 PM    Potassium 4.4 03/12/2021 12:34 PM    Chloride 107 03/12/2021 12:34 PM    CO2 30 03/12/2021 12:34 PM    Anion gap 7 03/12/2021 12:34 PM    Glucose 110 (H) 03/12/2021 12:34 PM    BUN 12 03/12/2021 12:34 PM    Creatinine 0.68 03/12/2021 12:34 PM    BUN/Creatinine ratio 18 03/12/2021 12:34 PM    GFR est AA >60 03/12/2021 12:34 PM    GFR est non-AA >60 03/12/2021 12:34 PM    Calcium 9.3 03/12/2021 12:34 PM    Bilirubin, total 0.3 03/12/2021 12:37 PM    Alk. phosphatase 141 (H) 03/12/2021 12:37 PM    Protein, total 7.8 03/12/2021 12:37 PM    Albumin 3.8 03/12/2021 12:37 PM    Globulin 4.0 03/12/2021 12:37 PM    A-G Ratio 1.0 03/12/2021 12:37 PM    ALT (SGPT) 28 03/12/2021 12:37 PM    AST (SGOT) 12 03/12/2021 12:37 PM     Lab Results   Component Value Date/Time    Cholesterol, total 209 (H) 03/12/2021 12:37 PM    HDL Cholesterol 46 03/12/2021 12:37 PM    LDL, calculated 100.8 (H) 03/12/2021 12:37 PM    VLDL, calculated 62.2 03/12/2021 12:37 PM    Triglyceride 311 (H) 03/12/2021 12:37 PM    CHOL/HDL Ratio 4.5 03/12/2021 12:37 PM     Lab Results   Component Value Date/Time    TSH 1.64 03/12/2021 12:34 PM     Lab Results   Component Value Date/Time    Hemoglobin A1c 6.6 (H) 03/12/2021 12:34 PM     Lab Results   Component Value Date/Time    Vitamin D 25-Hydroxy 11.1 (L) 03/12/2021 12:34 PM           ROS: see HPI     Physical Exam  Constitutional:       General: She is not in acute distress. Neck:      Comments: Borderline large palpable thyroid gland. Nontender  Cardiovascular:      Rate and Rhythm: Normal rate and regular rhythm.       Heart sounds: Normal heart sounds. Abdominal:      General: Bowel sounds are normal.      Palpations: Abdomen is soft. Tenderness: There is no abdominal tenderness. Neurological:      Mental Status: She is oriented to person, place, and time. Psychiatric:         Behavior: Behavior normal.         ASSESSMENT and PLAN    ICD-10-CM ICD-9-CM    1. Essential hypertension : well controlled. Continue current dose of medication and low salt diet. Exercise as tolerated. I10 401.9    2. Diabetes mellitus, new onset ([address]): New onset. Noncompliant with lifestyle modification. A1c around 6.6. Starting Metformin. Discussed medication side effects. Again discussed the importance of diet modification. We will follow-up next visit E11.9 250.00 MICROALBUMIN, UR, RAND W/ MICROALB/CREAT RATIO   3. Hypertriglyceridemia: Noncompliant with the diet. Will recheck labs. She will start TriCor as has not started yet E78.1 272.1 LIPID PANEL      METABOLIC PANEL, COMPREHENSIVE   4. Gastroesophageal reflux disease without esophagitis: On and off flare. Taking PPI not helping. We will send her to GI for possible further recommendations. Questionable need for EGD K21.9 530.81 REFERRAL TO GASTROENTEROLOGY   5. Anxiety: Fairly stable F41.9 300.00    6. Abnormal nuclear stress test: Denies any anginal symptoms. Discussed the risk factor management R94.39 794.39    7. Vitamin D deficiency: Will start Drisdol E55.9 268.9    8. Enlarged thyroid: Obtain thyroid ultrasound E04.9 240.9 US THYROID/PARATHYROID/SOFT TISS   9. Screening for colon cancer  Z12.11 V76.51 REFERRAL TO GASTROENTEROLOGY   Patient understood agreed with the plan  Given central scheduling number to schedule her mammogram  Advised to schedule an appointment with OB/GYN as she wants to continue doing Pap with them.   Discussed that she is due for Pap  Discussed importance of yearly eye exam.  Discussed importance of keeping follow-up appointments  Follow-up and Dispositions · Return in about 2 months (around 5/16/2021). Please note that this dictation was completed with [employer], the computer voice recognition software. Quite often unanticipated grammatical, syntax, homophones, and other interpretive errors are inadvertently transcribed by the computer software. Please disregard these errors. Please excuse any errors that have escaped final proofreading.

## 2021-03-16 NOTE — PATIENT INSTRUCTIONS
Learning About Low-Fat Eating What is low-fat eating? Most food has some fat in it. Your body needs some fat to be healthy. But some kinds of fats are healthier than others. In a low-fat eating plan, you try to choose healthier fats and eat fewer unhealthy fats. Healthy fats include olive and canola oil. Try to avoid eating too much saturated fat (such as in cheese and meats) and trans fat (a type of fat found in many packaged snack foods and other baked goods). You do not need to cut all fat from your diet. But you can make healthier choices about the types and amount of fat you eat. Even though it is a good idea to choose healthier fats, it is still important to be careful of how much fat you eat, because all fats are high in calories. What are the different types of fats? Unhealthy fat · Saturated fat. Saturated fats are mostly in animal foods, such as meat and dairy foods. Tropical oils, such as coconut oil, palm oil, and cocoa butter, are also saturated fats. Healthy fats · Monounsaturated fat. Monounsaturated fats are liquid at room temperature but get solid when refrigerated. Eating foods that are high in this fat may help lower your \"bad\" (LDL) cholesterol, keep your \"good\" (HDL) cholesterol level up, and lower your chances of getting coronary artery disease. This fat is found in canola oil, olive oil, peanut oil, olives, avocados, nuts, and nut butters. · Polyunsaturated fat. Polyunsaturated fats are liquid at room temperature. They are in safflower, sunflower, and corn oils. They are also the main fat in seafood. Omega-3 fatty acids are types of polyunsaturated fat. Eating fish may lower your chances of getting coronary artery disease. Fatty fish such as salmon and mackerel contain these healthy fatty acids. So do ground flaxseeds and flaxseed oil, soybeans, walnuts, and seeds. Why cut down on unhealthy fats?  
Eating foods that contain saturated fats can raise the LDL (\"bad\") cholesterol in your blood. Having a high level of LDL cholesterol increases your chance of hardening of the arteries (atherosclerosis), which can lead to heart disease, heart attack, and stroke. Trans fat raises the level of \"bad\" LDL cholesterol in your blood and may lower the \"good\" HDL cholesterol in your blood. Trans fat can raise your risk of heart disease, heart attack, and stroke. In general: · No more than 10% of your daily calories should come from saturated fat. This is about 20 grams in a 2,000-calorie diet. · No more than 10% of your daily calories should come from polyunsaturated fat. This is about 20 grams in a 2,000-calorie diet. · Monounsaturated fats can be up to 15% of your daily calories. This is about 25 to 30 grams in a 2,000-calorie diet. If you're not sure how much fat you should be eating or how many calories you need each day to stay at a healthy weight, talk to a registered dietitian. He or she can help you create a plan that's right for you. What can you do to cut down on fat? Foods like cheese, butter, sausage, and desserts can have a lot of unhealthy fats. Try these tips for healthier meals at home and when you eat out. At home · Fill up on fruits, vegetables, and whole grains. · Think of meat as a side dish instead of as the main part of your meal. 
· When you do eat meat, make it extra-lean ground beef (97% lean), ground turkey breast (without skin added), meats with fat trimmed off before cooking, or skinless chicken. · Try main dishes that use whole wheat pasta, brown rice, dried beans, or vegetables. · Use cooking methods that use little or no fat, such as broiling, steaming, or grilling. Use cooking spray instead of oil. If you use oil, use a monounsaturated oil, such as canola or olive oil. · Read food labels on canned, bottled, or packaged foods. Choose those with little saturated fat and no trans fat. When eating out at a restaurant · Order foods that are broiled or poached instead of fried or breaded. · Cut back on the amount of butter or margarine that you use on bread. Use small amounts of olive oil instead. · Order sauces, gravies, and salad dressings on the side, and use only a little. · When you order pasta, choose tomato sauce instead of cream sauce. · Ask for salsa with your baked potato instead of sour cream, butter, cheese, or retana. Where can you learn more? Go to http://www.gray.com/ Enter Y813 in the search box to learn more about \"Learning About Low-Fat Eating. \" Current as of: August 22, 2019               Content Version: 12.6 © 4204-5014 [employer]. Care instructions adapted under license by [employer] (which disclaims liability or warranty for this information). If you have questions about a medical condition or this instruction, always ask your healthcare professional. [address] any warranty or liability for your use of this information. Learning About Low-Carbohydrate Diets What is a low-carbohydrate diet? A low-carbohydrate (or \"low-carb\") diet limits foods and drinks that have carbohydrates. This includes grains, fruits, milk and yogurt, and starchy vegetables like potatoes, beans, and corn. It also avoids foods and drinks that have added sugar. Instead, low-carb diets include foods that are high in protein and fat. Why might you follow a low-carb diet? Low-carb diets may be used for a variety of reasons, such as for weight loss. People who have diabetes may use a low-carb diet to help manage their blood sugar levels. What should you do before you start the diet? Talk to your doctor before you try any diet. This is even more important if you have health problems like kidney disease, heart disease, or diabetes. Your doctor may suggest that you meet with a registered dietitian. A dietitian can help you make an eating plan that works for you.  
What foods do you eat on a low-carb diet? On a low-carb diet, you choose foods that are high in protein and fat. Examples of these are: · Meat, poultry, and fish. · Eggs. · Nuts, such as walnuts, pecans, almonds, and peanuts. · Peanut butter and other nut butters. · Tofu. · Avocado. · Lidia Luis Eduardo. · Non-starchy vegetables like broccoli, cauliflower, green beans, mushrooms, peppers, lettuce, and spinach. · Unsweetened non-dairy milks like almond milk and coconut milk. · Cheese, cottage cheese, and cream cheese. Current as of: August 22, 2019               Content Version: 12.6 © 1381-8537 [employer]. Care instructions adapted under license by [employer] (which disclaims liability or warranty for this information). If you have questions about a medical condition or this instruction, always ask your healthcare professional. [place] [address] any warranty or liability for your use of this information. Nutrition Tips for Diabetes: After Your Visit Your Care Instructions A healthy diet is important to manage diabetes. It helps you lose weight (if you need to) and keep it off. It gives you the nutrition and energy your body needs and helps prevent heart disease. But a diet for diabetes does not mean that you have to eat special foods. You can eat what your family eats, including occasional sweets and other favorites. But you do have to pay attention to how often you eat and how much you eat of certain foods. The right plan for you will give you meals that help you keep your blood sugar at healthy levels. Try to eat a variety of foods and to spread carbohydrate throughout the day. Carbohydrate raises blood sugar higher and more quickly than any other nutrient does. Carbohydrate is found in sugar, breads and cereals, fruit, starchy vegetables such as potatoes and corn, and milk and yogurt. You may want to work with a dietitian or diabetes educator to help you plan meals and snacks.  A dietitian or diabetes educator also can help you lose weight if that is one of your goals. The following tips can help you enjoy your meals and stay healthy. Follow-up care is a key part of your treatment and safety. Be sure to make and go to all appointments, and call your doctor if you are having problems. Its also a good idea to know your test results and keep a list of the medicines you take. How can you care for yourself at home? · Learn which foods have carbohydrate and how much carbohydrate to eat. A dietitian or diabetes educator can help you learn to keep track of how much carbohydrate you eat. · Spread carbohydrate throughout the day. Eat some carbohydrate at all meals, but do not eat too much at any one time. · Plan meals to include food from all the food groups. These are the food groups and some example portion sizes: ¨ Grains: 1 slice of bread (1 ounce), ½ cup of cooked cereal, and 1/3 cup of cooked pasta or rice. These have about 15 grams of carbohydrate in a serving. Choose whole grains such as whole wheat bread or crackers, oatmeal, and brown rice more often than refined grains. ¨ Fruit: 1 small fresh fruit, such as an apple or orange; ½ of a banana; ½ cup of chopped, cooked, or canned fruit; ½ cup of fruit juice; 1 cup of melon or raspberries; and 2 tablespoons of dried fruit. These have about 15 grams of carbohydrate in a serving. ¨ Dairy: 1 cup of nonfat or low-fat milk and 2/3 cup of plain yogurt. These have about 15 grams of carbohydrate in a serving. ¨ Protein foods: Beef, chicken, turkey, fish, eggs, tofu, cheese, cottage cheese, and peanut butter. A serving size of meat is 3 ounces, which is about the size of a deck of cards. Examples of meat substitute serving sizes (equal to 1 ounce of meat) are 1/4 cup of cottage cheese, 1 egg, 1 tablespoon of peanut butter, and ½ cup of tofu. These have very little or no carbohydrate per serving.  
¨ Vegetables: Starchy vegetables such as ½ cup of cooked dried beans, peas, potatoes, or corn have about 15 grams of carbohydrate. Nonstarchy vegetables have very little carbohydrate, such as 1 cup of raw leafy vegetables (such as spinach), ½ cup of other vegetables (cooked or chopped), and 3/4 cup of vegetable juice. · Use the plate format to plan meals. It is a good, quick way to make sure that you have a balanced meal. It also helps you spread carbohydrate throughout the day. You divide your plate by types of foods. Put vegetables on half the plate, meat or meat substitutes on one-quarter of the plate, and a grain or starchy vegetable (such as brown rice or a potato) in the final quarter of the plate. To this you can add a small piece of fruit and 1 cup of milk or yogurt, depending on how much carbohydrate you are supposed to eat at a meal. 
· Talk to your dietitian or diabetes educator about ways to add limited amounts of sweets into your meal plan. You can eat these foods now and then, as long as you include the amount of carbohydrate they have in your daily carbohydrate allowance. · If you drink alcohol, limit it to no more than 1 drink a day for women and 2 drinks a day for men. If you are pregnant, no amount of alcohol is known to be safe. · Protein, fat, and fiber do not raise blood sugar as much as carbohydrate does. If you eat a lot of these nutrients in a meal, your blood sugar will rise more slowly than it would otherwise. · Limit saturated fats, such as those from meat and dairy products. Try to replace it with monounsaturated fat, such as olive oil. This is a healthier choice because people who have diabetes are at higher-than-average risk of heart disease. But use a modest amount of olive oil. A tablespoon of olive oil has 14 grams of fat and 120 calories. · Exercise lowers blood sugar. If you take insulin by shots or pump, you can use less than you would if you were not exercising. Keep in mind that timing matters.  If you exercise within 1 hour after a meal, your body may need less insulin for that meal than it would if you exercised 3 hours after the meal. Test your blood sugar to find out how exercise affects your need for insulin. · Exercise on most days of the week. Aim for at least 30 minutes. Exercise helps you stay at a healthy weight and helps your body use insulin. Walking is an easy way to get exercise. Gradually increase the amount you walk every day. You also may want to swim, bike, or do other activities. When you eat out · Learn to estimate the serving sizes of foods that have carbohydrate. If you measure food at home, it will be easier to estimate the amount in a serving of restaurant food. · If the meal you order has too much carbohydrate (such as potatoes, corn, or baked beans), ask to have a low-carbohydrate food instead. Ask for a salad or green vegetables. · If you use insulin, check your blood sugar before and after eating out to help you plan how much to eat in the future. · If you eat more carbohydrate at a meal than you had planned, take a walk or do other exercise. This will help lower your blood sugar. Where can you learn more? Go to [employer]att[employer] Enter U575 in the search box to learn more about \"Nutrition Tips for Diabetes: After Your Visit. \"  
© 0639-6175 [employer]wise, [employer]orporated. Care instructions adapted under license by [facility] "Mobile Location, IP" [facility] (which disclaims liability or warranty for this information). This care instruction is for use with your licensed healthcare professional. If you have questions about a medical condition or this instruction, always ask your healthcare professional. [place] [address] any warranty or liability for your use of this information. Content Version: 17.5.909719; Current as of: June 4, 2014 Gastroesophageal Reflux Disease (GERD): Care Instructions Overview Gastroesophageal reflux disease (GERD) is the backward flow of stomach acid into the esophagus. The esophagus is the tube that leads from your throat to your stomach. A one-way valve prevents the stomach acid from backing up into this tube. But when you have GERD, this valve does not close tightly enough. This can also cause pain and swelling in your esophagus. (This is called esophagitis.) If you have mild GERD symptoms including heartburn, you may be able to control the problem with antacids or over-the-counter medicine. You can also make lifestyle changes to help reduce your symptoms. These include changing your diet and eating habits, such as not eating late at night and losing weight. Follow-up care is a key part of your treatment and safety. Be sure to make and go to all appointments, and call your doctor if you are having problems. It's also a good idea to know your test results and keep a list of the medicines you take. How can you care for yourself at home? · Take your medicines exactly as prescribed. Call your doctor if you think you are having a problem with your medicine. · Your doctor may recommend over-the-counter medicine. For mild or occasional indigestion, antacids, such as Tums, Gaviscon, Mylanta, or Maalox, may help. Your doctor also may recommend over-the-counter acid reducers, such as famotidine (Pepcid AC), cimetidine (Tagamet HB), or omeprazole (Prilosec). Read and follow all instructions on the label. If you use these medicines often, talk with your doctor. · Change your eating habits. ? It's best to eat several small meals instead of two or three large meals. ? After you eat, wait 2 to 3 hours before you lie down. ? Chocolate, mint, and alcohol can make GERD worse. ? Spicy foods, foods that have a lot of acid (like tomatoes and oranges), and coffee can make GERD symptoms worse in some people. If your symptoms are worse after you eat a certain food, you may want to stop eating that food to see if your symptoms get better.  
· Do not smoke or chew tobacco. Smoking can make GERD worse. If you need help quitting, talk to your doctor about stop-smoking programs and medicines. These can increase your chances of quitting for good. · If you have GERD symptoms at night, raise the head of your bed 6 to 8 inches by putting the frame on blocks or placing a foam wedge under the head of your mattress. (Adding extra pillows does not work.) · Do not wear tight clothing around your middle. · Lose weight if you need to. Losing just 5 to 10 pounds can help. When should you call for help? Call your doctor now or seek immediate medical care if: 
  · You have new or different belly pain.  
  · Your stools are black and tarlike or have streaks of blood. Watch closely for changes in your health, and be sure to contact your doctor if: 
  · Your symptoms have not improved after 2 days.  
  · Food seems to catch in your throat or chest.  
Where can you learn more? Go to http://www.gray.com/ Enter G994 in the search box to learn more about \"Gastroesophageal Reflux Disease (GERD): Care Instructions. \" Current as of: April 15, 2020               Content Version: 12.6 © 3268-0268 [employer]. Care instructions adapted under license by [employer] (which disclaims liability or warranty for this information). If you have questions about a medical condition or this instruction, always ask your healthcare professional. [address] any warranty or liability for your use of this information.

## 2021-04-14 ENCOUNTER — HOSPITAL ENCOUNTER (OUTPATIENT)
Dept: ULTRASOUND IMAGING | Age: 56
Discharge: HOME | End: 2021-04-14
Attending: FAMILY MEDICINE
Payer: COMMERCIAL

## 2021-04-14 PROCEDURE — 76536 US EXAM OF HEAD AND NECK: CPT

## 2021-04-15 DIAGNOSIS — G89.29 OTHER CHRONIC PAIN: ICD-10-CM

## 2021-04-16 RX ORDER — INDOMETHACIN 50 MG/1
CAPSULE ORAL
Qty: 15 CAP | Refills: 0 | Status: SHIPPED | OUTPATIENT
Start: 2021-04-16 | End: 2021-10-13 | Stop reason: SDUPTHER

## 2021-04-28 ENCOUNTER — OFFICE VISIT (OUTPATIENT)
Dept: FAMILY MEDICINE CLINIC | Age: 56
End: 2021-04-28

## 2021-04-28 ENCOUNTER — TELEPHONE (OUTPATIENT)
Dept: FAMILY MEDICINE CLINIC | Age: 56
End: 2021-04-28

## 2021-04-28 ENCOUNTER — IMMUNIZATION CLINIC (OUTPATIENT)
Dept: FAMILY MEDICINE CLINIC | Age: 56
End: 2021-04-28
Payer: COMMERCIAL

## 2021-04-28 ENCOUNTER — HOSPITAL ENCOUNTER (OUTPATIENT)
Dept: LAB | Age: 56
Discharge: HOME | End: 2021-04-28
Payer: COMMERCIAL

## 2021-04-28 VITALS
RESPIRATION RATE: 16 BRPM | OXYGEN SATURATION: 94 % | TEMPERATURE: 100.7 F | DIASTOLIC BLOOD PRESSURE: 90 MMHG | HEART RATE: 91 BPM | SYSTOLIC BLOOD PRESSURE: 128 MMHG

## 2021-04-28 VITALS
SYSTOLIC BLOOD PRESSURE: 128 MMHG | DIASTOLIC BLOOD PRESSURE: 90 MMHG | RESPIRATION RATE: 16 BRPM | HEART RATE: 91 BPM | OXYGEN SATURATION: 94 % | TEMPERATURE: 100.7 F

## 2021-04-28 DIAGNOSIS — R05.9 COUGH: Primary | ICD-10-CM

## 2021-04-28 DIAGNOSIS — J02.9 SORE THROAT: ICD-10-CM

## 2021-04-28 DIAGNOSIS — R05.9 COUGH: ICD-10-CM

## 2021-04-28 DIAGNOSIS — U07.1 LAB TEST POSITIVE FOR DETECTION OF COVID-19 VIRUS: ICD-10-CM

## 2021-04-28 DIAGNOSIS — J02.9 SORETHROAT: ICD-10-CM

## 2021-04-28 DIAGNOSIS — J02.9 SORETHROAT: Primary | ICD-10-CM

## 2021-04-28 LAB
FLUAV+FLUBV AG NOSE QL IA.RAPID: NEGATIVE
FLUAV+FLUBV AG NOSE QL IA.RAPID: NEGATIVE
S PYO AG THROAT QL: NEGATIVE
VALID INTERNAL CONTROL?: YES
VALID INTERNAL CONTROL?: YES

## 2021-04-28 PROCEDURE — 99214 OFFICE O/P EST MOD 30 MIN: CPT | Performed by: NURSE PRACTITIONER

## 2021-04-28 PROCEDURE — 87880 STREP A ASSAY W/OPTIC: CPT | Performed by: NURSE PRACTITIONER

## 2021-04-28 PROCEDURE — U0003 INFECTIOUS AGENT DETECTION BY NUCLEIC ACID (DNA OR RNA); SEVERE ACUTE RESPIRATORY SYNDROME CORONAVIRUS 2 (SARS-COV-2) (CORONAVIRUS DISEASE [COVID-19]), AMPLIFIED PROBE TECHNIQUE, MAKING USE OF HIGH THROUGHPUT TECHNOLOGIES AS DESCRIBED BY CMS-2020-01-R: HCPCS

## 2021-04-28 PROCEDURE — 87804 INFLUENZA ASSAY W/OPTIC: CPT | Performed by: NURSE PRACTITIONER

## 2021-04-28 PROCEDURE — 99213 OFFICE O/P EST LOW 20 MIN: CPT | Performed by: NURSE PRACTITIONER

## 2021-04-28 RX ORDER — ALBUTEROL SULFATE 90 UG/1
2 AEROSOL, METERED RESPIRATORY (INHALATION)
Qty: 1 INHALER | Refills: 0 | Status: SHIPPED | OUTPATIENT
Start: 2021-04-28 | End: 2021-05-05

## 2021-04-28 RX ORDER — MONTELUKAST SODIUM 10 MG/1
10 TABLET ORAL DAILY
Qty: 90 TAB | Refills: 0 | Status: SHIPPED | OUTPATIENT
Start: 2021-04-28 | End: 2021-05-05

## 2021-04-28 NOTE — TELEPHONE ENCOUNTER
Pt would like a rx for some cough medicine. Pt states she was advised to get tested today for covid. Pt states she coughs throughout the night. Please advise.

## 2021-04-28 NOTE — TELEPHONE ENCOUNTER
Please let pt know that I have given albuterol inhaler and singulair. She can hold citrazine. Also if she develops high grade fever, shortness of breath , she needs to go to ER. Keep monitoring her oxygen , can buy oxymeter from the pharmacy. Social distance. Facial mask. Quarantine for 10 days. Has she taken any covid vaccine? ?

## 2021-04-28 NOTE — PROGRESS NOTES
Jennifer Cornejo presents today for   Chief Complaint   Patient presents with    Generalized Body Aches     started 4/25/2021    Fever    Fatigue    Cough     runny nose       Is someone accompanying this pt? no    Is the patient using any DME equipment during OV? no    Travel and Exposure Screening was performed during check in or rooming process Yes  No      Depression Screening:  3 most recent PHQ Screens 4/28/2021   Little interest or pleasure in doing things Not at all   Feeling down, depressed, irritable, or hopeless Not at all   Total Score PHQ 2 0       Fall Risk  Fall Risk Assessment, last 12 mths 3/16/2021   Able to walk? Yes   Fall in past 12 months? 0   Do you feel unsteady? 0   Are you worried about falling 0       This Visit Test  Results for orders placed or performed during the hospital encounter of 03/12/21   LIPID PANEL   Result Value Ref Range    LIPID PROFILE          Cholesterol, total 209 (H) <200 MG/DL    Triglyceride 311 (H) <150 MG/DL    HDL Cholesterol 46 40 - 60 MG/DL    LDL, calculated 100.8 (H) 0 - 100 MG/DL    VLDL, calculated 62.2 MG/DL    CHOL/HDL Ratio 4.5 0 - 5.0     HEPATIC FUNCTION PANEL   Result Value Ref Range    Protein, total 7.8 6.4 - 8.2 g/dL    Albumin 3.8 3.4 - 5.0 g/dL    Globulin 4.0 2.0 - 4.0 g/dL    A-G Ratio 1.0 0.8 - 1.7      Bilirubin, total 0.3 0.2 - 1.0 MG/DL    Bilirubin, direct 0.1 0.0 - 0.2 MG/DL    Alk.  phosphatase 141 (H) 45 - 117 U/L    AST (SGOT) 12 10 - 38 U/L    ALT (SGPT) 28 13 - 56 U/L

## 2021-04-28 NOTE — TELEPHONE ENCOUNTER
Contacted patient and verified identity using name and date of birth (2- identifiers)  Spoke with patient and advised of Albuterol and Singulair. Was advised to hold Cetirizine. Patient stated that the last time she used an albuterol inhaler she had severe nausea and vomiting. Patient was advised if high grade fever, SOB to report to the ER> advised of use of pulse ox and available over the counter. She has not had a Covid vaccine as of yet.

## 2021-04-28 NOTE — PROGRESS NOTES
Bay Araujo presents today for   Chief Complaint   Patient presents with    Generalized Body Aches     started 4/25/21    Sore Throat    Fatigue    Fever    Cough       Is someone accompanying this pt? no    Is the patient using any DME equipment during OV? no    Travel and Exposure Screening was performed during check in or rooming process Yes       Depression Screening:  3 most recent PHQ Screens 4/28/2021   Little interest or pleasure in doing things Not at all   Feeling down, depressed, irritable, or hopeless Not at all   Total Score PHQ 2 0       Fall Risk  Fall Risk Assessment, last 12 mths 3/16/2021   Able to walk? Yes   Fall in past 12 months? 0   Do you feel unsteady? 0   Are you worried about falling 0       This Visit Test  Results for orders placed or performed during the hospital encounter of 03/12/21   LIPID PANEL   Result Value Ref Range    LIPID PROFILE          Cholesterol, total 209 (H) <200 MG/DL    Triglyceride 311 (H) <150 MG/DL    HDL Cholesterol 46 40 - 60 MG/DL    LDL, calculated 100.8 (H) 0 - 100 MG/DL    VLDL, calculated 62.2 MG/DL    CHOL/HDL Ratio 4.5 0 - 5.0     HEPATIC FUNCTION PANEL   Result Value Ref Range    Protein, total 7.8 6.4 - 8.2 g/dL    Albumin 3.8 3.4 - 5.0 g/dL    Globulin 4.0 2.0 - 4.0 g/dL    A-G Ratio 1.0 0.8 - 1.7      Bilirubin, total 0.3 0.2 - 1.0 MG/DL    Bilirubin, direct 0.1 0.0 - 0.2 MG/DL    Alk.  phosphatase 141 (H) 45 - 117 U/L    AST (SGOT) 12 10 - 38 U/L    ALT (SGPT) 28 13 - 56 U/L

## 2021-04-28 NOTE — LETTER
NOTIFICATION RETURN TO WORK / SCHOOL 
 
4/28/2021 2:06 PM 
 
Ms. Chasity Barajas 
[address] [place] [phone] To Whom It May Concern: 
 
Chasity Barajas is currently under the care of Nancy Hwang. Patient is under quarantine until 5/5/2021. If there are questions or concerns please have the patient contact our office. Sincerely, Sania Keane NP

## 2021-04-28 NOTE — TELEPHONE ENCOUNTER
I have spoke with pt and advised to try albuterol and if she does not tolerate it call back office. Started symptoms since Friday. Has fever around 101. Having worsening of cough. Was positive covid test today. No sob. Able to talk in full sentence. Can take tylenol or motrin alternate with food.

## 2021-04-28 NOTE — PROGRESS NOTES
SUBJECTIVE:  Chief Complaint   Patient presents with    Generalized Body Aches     started 4/25/2021    Fever    Fatigue    Cough     runny nose     Patient denies recent travel. Pt exposed to sick contacts under investigations for possible Covid NO. Pt is not a current smoker. OBJECTIVE    Visit Vitals  BP (!) 128/90 (BP 1 Location: Right lower arm, BP Patient Position: Sitting)   Pulse 91   Temp (!) 100.7 °F (38.2 °C) (Oral)   Resp 16   LMP 01/28/2015   SpO2 94%      General:  healthy, alert, well developed, well nourished, cooperative, pleasant and in no apparent distress. Sick but not toxic appearing. Eyes:   The lids are without swelling, lesions, or drainage. The conjunctiva is clear and noninjected. ENT:  ENT exam normal, no neck nodes or sinus tenderness, bilateral TM normal without fluid or infection and no evidence of ectopic thyroid. Neck: normal, supple and no adenopathy. Lungs/CV: clear to auscultation, no wheezes or rales and unlabored breathing. Heart: regular rhythm normal rate. Skin:  No rashes, no jaundice. ICD-10-CM ICD-9-CM    1. Sorethroat  J02.9 462 AMB POC RAPID STREP A      AMB POC RAPID INFLUENZA TEST      NOVEL CORONAVIRUS (COVID-19)   2. Cough  R05 786.2 AMB POC RAPID STREP A      AMB POC RAPID INFLUENZA TEST      NOVEL CORONAVIRUS (COVID-19)       Results for orders placed or performed in visit on 04/28/21   AMB POC RAPID STREP A   Result Value Ref Range    VALID INTERNAL CONTROL POC Yes     Group A Strep Ag Negative Negative   AMB POC RAPID INFLUENZA TEST   Result Value Ref Range    VALID INTERNAL CONTROL POC Yes     Influenza A Ag POC Negative Negative    Influenza B Ag POC Negative Negative     Based on CDC recommendations and limited testing supplies, only those patients who meet criteria will be tested for Covid.      High priority groups for testing   Symptomatic and/or Exposure /Test for Covid  Immunocompromised host (on prednisone, biological therapy, blood cancer, metastatic cancer or active chemotherapy)   [facility] care worker in the home    Other high-risk group: o age >47   o Uncontrolled DM   o Uncontrolled HTN   o BMI >40, CKD/ESRD    Dialysis patients (patients going to HD units, not asymptomatic home HD/PD)    Anyone living in a congregate setting     Non High-risk patient category           Test for COVID-19   Asymptomatic, no known exposure  No    Asymptomatic, possible exposure  No    Asymptomatic, definite exposure  Provider discretion    Symptomatic, no known exposure  Yes    Symptomatic, + exposure  Yes      Patient does  meet criteria for Covid testing. COVID-19 testing was completed. Work note was given until [employer] are available. If Covid testing was completed and is negative, patient may return back to work despite quarantine originally set in place if applicable. Instructed pt on the importance of rest, fluid intake (with avoidance of red fluids), zinc and vit C supplements. Instructed pt to check temp if possible and to take acetaminophen or NSAIDs if fevers are noted. Instructed patient to remember to wash hands, disinfect surroundings, and avoid touching face. Instructed pt to remain home and and self quarantine until Covid results are negative and all symptoms have improved or subsided. If they must leave home, wear a mask. Patient verbalized understanding. We have provided the patient with a detailed after visit summary which was reviewed, and red flag symptoms that would warrant an ER visit were emphasized. CC'd chart to PCP: Yes: Comment: Dr. Jeanna Purvis, NP-C    This visit was provided as a focused evaluation during the COVID -19 pandemic/national emergency. A comprehensive review of all previous patient history and testing was not conducted. Pertinent findings were elicited during the visit.

## 2021-04-28 NOTE — PROGRESS NOTES
SUBJECTIVE:  Chief Complaint   Patient presents with    Generalized Body Aches     started 4/25/2021    Fever    Fatigue    Cough     runny nose     Patient denies recent travel. Pt exposed to sick contacts under investigations for possible Covid NO. Pt is not a current smoker. OBJECTIVE    Visit Vitals  BP (!) 128/90 (BP 1 Location: Right lower arm, BP Patient Position: Sitting)   Pulse 91   Temp (!) 100.7 °F (38.2 °C) (Oral)   Resp 16   LMP 01/28/2015   SpO2 94%      General:  healthy, alert, well developed, well nourished, cooperative, pleasant and in no apparent distress. Sick but not toxic appearing. Eyes:   The lids are without swelling, lesions, or drainage. The conjunctiva is clear and noninjected. ENT:  ENT exam normal, no neck nodes or sinus tenderness, bilateral TM normal without fluid or infection and no evidence of ectopic thyroid. Neck: normal, supple and no adenopathy. Lungs/CV: clear to auscultation, no wheezes or rales and unlabored breathing. Heart: regular rhythm normal rate. Skin:  No rashes, no jaundice. ASSESSMENT / PLAN     ICD-10-CM ICD-9-CM    1. Sorethroat  J02.9 462 AMB POC RAPID STREP A      AMB POC RAPID INFLUENZA TEST      NOVEL CORONAVIRUS (COVID-19)   2. Cough  R05 786.2 AMB POC RAPID STREP A      AMB POC RAPID INFLUENZA TEST      NOVEL CORONAVIRUS (COVID-19)     Results for orders placed or performed in visit on 04/28/21   AMB POC RAPID STREP A   Result Value Ref Range    VALID INTERNAL CONTROL POC Yes     Group A Strep Ag Negative Negative   AMB POC RAPID INFLUENZA TEST   Result Value Ref Range    VALID INTERNAL CONTROL POC Yes     Influenza A Ag POC Negative Negative    Influenza B Ag POC Negative Negative     Based on CDC recommendations and limited testing supplies, only those patients who meet criteria will be tested for Covid.      High priority groups for testing   Symptomatic and/or Exposure /Test for Covid  Immunocompromised host (on prednisone, biological therapy, blood cancer, metastatic cancer or active chemotherapy)   [facility] care worker in the home    Other high-risk group: o age >47   o Uncontrolled DM   o Uncontrolled HTN   o BMI >40, CKD/ESRD    Dialysis patients (patients going to HD units, not asymptomatic home HD/PD)    Anyone living in a congregate setting     Non High-risk patient category           Test for COVID-19   Asymptomatic, no known exposure  No    Asymptomatic, possible exposure  No    Asymptomatic, definite exposure  Provider discretion    Symptomatic, no known exposure  Yes    Symptomatic, + exposure  Yes      Patient does  meet criteria for Covid testing. COVID-19 testing was completed. Work note was given until [employer] are available. If Covid testing was completed and is negative, patient may return back to work despite quarantine originally set in place if applicable. Instructed pt on the importance of rest, fluid intake (with avoidance of red fluids), zinc and vit C supplements. Instructed pt to check temp if possible and to take acetaminophen or NSAIDs if fevers are noted. Instructed patient to remember to wash hands, disinfect surroundings, and avoid touching face. Instructed pt to remain home and and self quarantine until Covid results are negative and all symptoms have improved or subsided. If they must leave home, wear a mask. Patient verbalized understanding. We have provided the patient with a detailed after visit summary which was reviewed, and red flag symptoms that would warrant an ER visit were emphasized. CC'd chart to PCP: Yes: Comment: Dr. Ramona Kam, NP-C    This visit was provided as a focused evaluation during the COVID -19 pandemic/national emergency. A comprehensive review of all previous patient history and testing was not conducted. Pertinent findings were elicited during the visit.

## 2021-04-30 LAB — SARS-COV-2, COV2NT: DETECTED

## 2021-05-03 ENCOUNTER — TELEPHONE (OUTPATIENT)
Dept: FAMILY MEDICINE CLINIC | Age: 56
End: 2021-05-03

## 2021-05-03 NOTE — TELEPHONE ENCOUNTER
Pt tested positive for covid 19 on 4/28/2021 she received the results today and would like to know exactly what she needs to do. She would also like to know if DR Loreto Nam could give her something for nausea. Please advise.  [facility] [phone]

## 2021-05-03 NOTE — TELEPHONE ENCOUNTER
Contacted patient and verified identity using name and date of birth (2- identifiers)  Spoke with patient and she indicated that she is just very very tired. She states her temp will go down to 99 but will go right back up to 101. Reports HA, no appetite, Nausea and fatigue.  Please advise

## 2021-05-03 NOTE — TELEPHONE ENCOUNTER
Oxygen saturation going lower around  90%- 91% . fever is also 100-101. Advised her to go to Er. She denies any shortness of breath but due to low oxygen saturation sent her to Er.

## 2021-05-03 NOTE — PROGRESS NOTES
Patient notified by phone her Covid test was positive. Advised patient if she were to become short of breath to respond to emergency room. Patient reports that she spoke with her PCP, patient says her pulse ox has been fluctuating and her PCP asked her to respond to ED. Patient denied being short of breath. She says she still has been running a bit of a fever.

## 2021-05-03 NOTE — TELEPHONE ENCOUNTER
I have spoke to her on 28th April. Please ask how is she feeling. Is any worsening of shortness of breath or any worsening of cough. Please ask her fever has came down or not. If any worsening of shortness of breath or fever has not broken down she needs to go to emergency room. I have advised her to take the albuterol as needed. Meantime also she needs to start taking aspirin every day.

## 2021-05-05 ENCOUNTER — HOSPITAL ENCOUNTER (EMERGENCY)
Age: 56
Discharge: HOME | End: 2021-05-05
Attending: EMERGENCY MEDICINE
Payer: COMMERCIAL

## 2021-05-05 ENCOUNTER — APPOINTMENT (OUTPATIENT)
Dept: GENERAL RADIOLOGY | Age: 56
End: 2021-05-05
Attending: EMERGENCY MEDICINE
Payer: COMMERCIAL

## 2021-05-05 VITALS
WEIGHT: 211 LBS | SYSTOLIC BLOOD PRESSURE: 109 MMHG | OXYGEN SATURATION: 92 % | RESPIRATION RATE: 20 BRPM | BODY MASS INDEX: 35.16 KG/M2 | DIASTOLIC BLOOD PRESSURE: 72 MMHG | TEMPERATURE: 98.4 F | HEIGHT: 65 IN | HEART RATE: 87 BPM

## 2021-05-05 DIAGNOSIS — R11.0 NAUSEA WITHOUT VOMITING: ICD-10-CM

## 2021-05-05 DIAGNOSIS — U07.1 COVID-19: Primary | ICD-10-CM

## 2021-05-05 LAB
ANION GAP SERPL CALC-SCNC: 8 MMOL/L (ref 3–18)
BASOPHILS # BLD: 0 K/UL (ref 0–0.1)
BASOPHILS NFR BLD: 0 % (ref 0–2)
BUN SERPL-MCNC: 12 MG/DL (ref 7–18)
BUN/CREAT SERPL: 13 (ref 12–20)
CALCIUM SERPL-MCNC: 9.1 MG/DL (ref 8.5–10.1)
CHLORIDE SERPL-SCNC: 105 MMOL/L (ref 100–111)
CO2 SERPL-SCNC: 25 MMOL/L (ref 21–32)
CREAT SERPL-MCNC: 0.92 MG/DL (ref 0.6–1.3)
DIFFERENTIAL METHOD BLD: NORMAL
EOSINOPHIL # BLD: 0 K/UL (ref 0–0.4)
EOSINOPHIL NFR BLD: 0 % (ref 0–5)
ERYTHROCYTE [DISTWIDTH] IN BLOOD BY AUTOMATED COUNT: 13.9 % (ref 11.6–14.5)
GLUCOSE SERPL-MCNC: 111 MG/DL (ref 74–99)
HCT VFR BLD AUTO: 41.3 % (ref 35–45)
HGB BLD-MCNC: 13.4 G/DL (ref 12–16)
LYMPHOCYTES # BLD: 1.5 K/UL (ref 0.9–3.6)
LYMPHOCYTES NFR BLD: 31 % (ref 21–52)
MCH RBC QN AUTO: 28.2 PG (ref 24–34)
MCHC RBC AUTO-ENTMCNC: 32.4 G/DL (ref 31–37)
MCV RBC AUTO: 86.9 FL (ref 74–97)
MONOCYTES # BLD: 0.4 K/UL (ref 0.05–1.2)
MONOCYTES NFR BLD: 9 % (ref 3–10)
NEUTS SEG # BLD: 2.9 K/UL (ref 1.8–8)
NEUTS SEG NFR BLD: 60 % (ref 40–73)
PLATELET # BLD AUTO: 202 K/UL (ref 135–420)
PMV BLD AUTO: 10.7 FL (ref 9.2–11.8)
POTASSIUM SERPL-SCNC: 3.7 MMOL/L (ref 3.5–5.5)
RBC # BLD AUTO: 4.75 M/UL (ref 4.2–5.3)
SODIUM SERPL-SCNC: 138 MMOL/L (ref 136–145)
WBC # BLD AUTO: 4.9 K/UL (ref 4.6–13.2)

## 2021-05-05 PROCEDURE — 71045 X-RAY EXAM CHEST 1 VIEW: CPT

## 2021-05-05 PROCEDURE — 85025 COMPLETE CBC W/AUTO DIFF WBC: CPT

## 2021-05-05 PROCEDURE — 99284 EMERGENCY DEPT VISIT MOD MDM: CPT

## 2021-05-05 PROCEDURE — 80048 BASIC METABOLIC PNL TOTAL CA: CPT

## 2021-05-05 RX ORDER — ONDANSETRON 4 MG/1
4 TABLET, FILM COATED ORAL
Qty: 20 TAB | Refills: 0 | Status: SHIPPED | OUTPATIENT
Start: 2021-05-05 | End: 2021-10-13

## 2021-05-05 NOTE — ED TRIAGE NOTES
Patient presents with c/o shortness of breath, fever and fatigue. She states being diagnosed with Covid 19 on April 28, 2021. She states taking Tylenol approximately one hour ago.

## 2021-05-05 NOTE — ED PROVIDER NOTES
HPI   62-year-old  female presents with a chief complaint of shortness of breath. Patient was diagnosed with the COVID-19 virus on April 28, 2021. She states that she has been experiencing intermittent fever, shortness of breath, fatigue, and diarrhea which is now resolved. Patient states that she purchased a home pulse oximeter at the instructions of her primary care physician which has been intermittently registering saturations in the lower 90 percentile. She states her T-max was 101.   States that she last took Tylenol approximately 1 hour prior to arrival.  Past Medical History:   Diagnosis Date    COVID-19     Diabetes ([facility] [address]     Pre diabetes    Essential hypertension     GERD (gastroesophageal reflux disease)     Glaucoma     Hyperlipidemia     Hypertension     Nausea & vomiting     nausea       Past Surgical History:   Procedure Laterality Date    HX CHOLECYSTECTOMY      HX CHOLECYSTECTOMY  2003    HX DILATION AND CURETTAGE  8/14    hysterectomy    HX HEENT      glaucoma    HX ORTHOPAEDIC      back    HX PARTIAL HYSTERECTOMY           Family History:   Problem Relation Age of Onset    Heart Attack Mother     Coronary Artery Disease Mother     Heart Attack Father     Coronary Artery Disease Brother     Cancer Maternal Grandmother     Heart Attack Paternal Grandfather        Social History     Socioeconomic History    Marital status:      Spouse name: Not on file    Number of children: Not on file    Years of education: Not on file    Highest education level: Not on file   Occupational History    Not on file   Social Needs    Financial resource strain: Not on file    Food insecurity     Worry: Not on file     Inability: Not on file    Transportation needs     Medical: Not on file     Non-medical: Not on file   Tobacco Use    Smoking status: Never Smoker    Smokeless tobacco: Never Used   Substance and Sexual Activity    Alcohol use: Not Currently    Drug use: Never    Sexual activity: Not Currently     Partners: Male   Lifestyle    Physical activity     Days per week: Not on file     Minutes per session: Not on file    Stress: Not on file   Relationships    Social connections     Talks on phone: Not on file     Gets together: Not on file     Attends [employer] service: Not on file     Active member of club or organization: Not on file     Attends meetings of clubs or organizations: Not on file     Relationship status: Not on file    Intimate partner violence     Fear of current or ex partner: Not on file     Emotionally abused: Not on file     Physically abused: Not on file     Forced sexual activity: Not on file   Other Topics Concern    Not on file   Social History Narrative    ** Merged History Encounter **              ALLERGIES: Albuterol, Codeine, Doxycycline, Morphine, and Morphine    Review of Systems   Constitutional: Positive for fatigue. Negative for chills, diaphoresis, fever and unexpected weight change. HENT: Negative for congestion, dental problem, ear discharge, ear pain, hearing loss, nosebleeds, postnasal drip, sinus pressure and sore throat. Eyes: Negative for photophobia, pain, discharge, redness and visual disturbance. Respiratory: Positive for shortness of breath. Negative for cough, chest tightness, wheezing and stridor. Cardiovascular: Negative for chest pain, palpitations and leg swelling. Gastrointestinal: Positive for nausea. Negative for abdominal pain, anal bleeding, blood in stool, constipation, diarrhea and vomiting. Endocrine: Negative for polydipsia, polyphagia and polyuria. Genitourinary: Negative for difficulty urinating, dyspareunia, dysuria, flank pain, frequency, genital sores, hematuria, menstrual problem, pelvic pain, urgency, vaginal bleeding, vaginal discharge and vaginal pain. Musculoskeletal: Negative for arthralgias, back pain, joint swelling, myalgias, neck pain and neck stiffness.    Skin: Negative for color change, rash and wound. Allergic/Immunologic: Negative for food allergies and immunocompromised state. Neurological: Negative for dizziness, tremors, seizures, syncope, weakness, light-headedness, numbness and headaches. Hematological: Negative for adenopathy. Does not bruise/bleed easily. Psychiatric/Behavioral: Negative for agitation, confusion, decreased concentration, hallucinations, sleep disturbance and suicidal ideas. The patient is nervous/anxious. All other systems reviewed and are negative. Vitals:    05/05/21 1457   BP: 110/69   Pulse: 87   Resp: 20   Temp: 98.4 °F (36.9 °C)   SpO2: 97%   Weight: 95.7 kg (211 lb)   Height: 5' 5\" (1.651 m)            Physical Exam  Vitals signs and nursing note reviewed. Constitutional:       General: She is not in acute distress. Appearance: She is well-developed. She is not diaphoretic. HENT:      Head: Normocephalic and atraumatic. Right Ear: Tympanic membrane and external ear normal.      Left Ear: Tympanic membrane and external ear normal.      Nose: Nose normal. No congestion or rhinorrhea. Mouth/Throat:      Mouth: Mucous membranes are moist.      Pharynx: No oropharyngeal exudate. Eyes:      General: No scleral icterus. Right eye: No discharge. Left eye: No discharge. Extraocular Movements: Extraocular movements intact. Conjunctiva/sclera: Conjunctivae normal.      Pupils: Pupils are equal, round, and reactive to light. Neck:      Musculoskeletal: Normal range of motion and neck supple. Thyroid: No thyromegaly. Vascular: No JVD. Trachea: No tracheal deviation. Cardiovascular:      Rate and Rhythm: Normal rate and regular rhythm. Heart sounds: Normal heart sounds. No murmur. No friction rub. No gallop. Pulmonary:      Effort: Pulmonary effort is normal. No respiratory distress. Breath sounds: Normal breath sounds. No stridor. No wheezing or rales.    Chest:      Chest wall: No tenderness. Abdominal:      General: Bowel sounds are normal. There is no distension. Palpations: Abdomen is soft. There is no mass. Tenderness: There is no abdominal tenderness. There is no right CVA tenderness, left CVA tenderness, guarding or rebound. Genitourinary:     Vagina: Normal.   Musculoskeletal: Normal range of motion. General: No tenderness. Lymphadenopathy:      Cervical: No cervical adenopathy. Skin:     General: Skin is warm and dry. Coloration: Skin is not pale. Findings: No erythema or rash. Neurological:      Mental Status: She is alert and oriented to person, place, and time. Cranial Nerves: No cranial nerve deficit. Deep Tendon Reflexes: Reflexes are normal and symmetric.    Psychiatric:         Behavior: Behavior normal.         Judgment: Judgment normal.          MDM  Number of Diagnoses or Management Options  Diagnosis management comments: Differential diagnosis includes: Viral illness, COVID-19 virus symptoms, pneumonia       Amount and/or Complexity of Data Reviewed  Clinical lab tests: reviewed and ordered  Tests in the radiology section of CPT®: ordered and reviewed  Tests in the medicine section of CPT®: ordered and reviewed  Review and summarize past medical records: yes    Risk of Complications, Morbidity, and/or Mortality  Presenting problems: high  Diagnostic procedures: high  Management options: high    Patient Progress  Patient progress: stable         Procedures        Orders Placed This Encounter    XR CHEST PORT     Standing Status:   Standing     Number of Occurrences:   1     Order Specific Question:   Reason for Exam     Answer:   Dyspnea, Covid positive    CBC WITH AUTOMATED DIFF     Standing Status:   Standing     Number of Occurrences:   1    BASIC METABOLIC PANEL     Standing Status:   Standing     Number of Occurrences:   1    Droplet Plus Isolation     Standing Status:   Standing     Number of Occurrences:   1  ondansetron hcl (Zofran) 4 mg tablet     Sig: Take 1 Tab by mouth every eight (8) hours as needed for Nausea. Dispense:  20 Tab     Refill:  0     Recent Results (from the past 12 hour(s))   CBC WITH AUTOMATED DIFF    Collection Time: 05/05/21  3:42 PM   Result Value Ref Range    WBC 4.9 4.6 - 13.2 K/uL    RBC 4.75 4.20 - 5.30 M/uL    HGB 13.4 12.0 - 16.0 g/dL    HCT 41.3 35.0 - 45.0 %    MCV 86.9 74.0 - 97.0 FL    MCH 28.2 24.0 - 34.0 PG    MCHC 32.4 31.0 - 37.0 g/dL    RDW 13.9 11.6 - 14.5 %    PLATELET 250 288 - 461 K/uL    MPV 10.7 9.2 - 11.8 FL    NEUTROPHILS 60 40 - 73 %    LYMPHOCYTES 31 21 - 52 %    MONOCYTES 9 3 - 10 %    EOSINOPHILS 0 0 - 5 %    BASOPHILS 0 0 - 2 %    ABS. NEUTROPHILS 2.9 1.8 - 8.0 K/UL    ABS. LYMPHOCYTES 1.5 0.9 - 3.6 K/UL    ABS. MONOCYTES 0.4 0.05 - 1.2 K/UL    ABS. EOSINOPHILS 0.0 0.0 - 0.4 K/UL    ABS. BASOPHILS 0.0 0.0 - 0.1 K/UL    DF AUTOMATED     METABOLIC PANEL, BASIC    Collection Time: 05/05/21  3:42 PM   Result Value Ref Range    Sodium 138 136 - 145 mmol/L    Potassium 3.7 3.5 - 5.5 mmol/L    Chloride 105 100 - 111 mmol/L    CO2 25 21 - 32 mmol/L    Anion gap 8 3.0 - 18 mmol/L    Glucose 111 (H) 74 - 99 mg/dL    BUN 12 7.0 - 18 MG/DL    Creatinine 0.92 0.6 - 1.3 MG/DL    BUN/Creatinine ratio 13 12 - 20      GFR est AA >60 >60 ml/min/1.73m2    GFR est non-AA >60 >60 ml/min/1.73m2    Calcium 9.1 8.5 - 10.1 MG/DL       XR CHEST PORT   Final Result      No radiographic evidence of acute cardiopulmonary process. 5:31 PM Upon re-evaluation the patient's symptoms have improved. Pt has non-toxic appearance and condition is stable for discharge. She was informed of her results, instructed to f/u with her PCP and return to the ED upon worsening of symptoms. All questions and concerns were addressed. Diagnosis:   1. COVID-19    2.  Nausea without vomiting          Disposition: Discharge home    Follow-up Information     Follow up With Specialties Details Why Contact Info    Heather Rodriguez MD Family Medicine Schedule an appointment as soon as possible for a visit in 2 days  [address] [facility]  [id number] [address] [place] [id number] [phone] [id number] [facility] EMERGENCY DEPT Emergency Medicine  As needed, If symptoms worsen [address] [facility]  [phone]          Patient's Medications   Start Taking    ONDANSETRON HCL (ZOFRAN) 4 MG TABLET    Take 1 Tab by mouth every eight (8) hours as needed for Nausea. Continue Taking    ATORVASTATIN (LIPITOR) 10 MG TABLET    take 1 tablet by mouth once daily    CETIRIZINE (ZYRTEC) 10 MG CAP    Take 10 mg by mouth daily. DICYCLOMINE (BENTYL) 20 MG TABLET    take 1 tablet by mouth every 6 hours if needed for ABDOMINAL CRAMPS    INDOMETHACIN (INDOCIN) 50 MG CAPSULE    take 1 capsule by mouth twice a day if needed    LISINOPRIL (PRINIVIL, ZESTRIL) 20 MG TABLET    take 1 tablet by mouth once daily    METOPROLOL SUCCINATE (TOPROL-XL) 25 MG XL TABLET    take 1 tablet by mouth once daily    NIFEDIPINE ER (PROCARDIA XL) 30 MG ER TABLET    take 1 tablet by mouth once daily    OMEPRAZOLE (PRILOSEC) 20 MG CAPSULE    take 1 capsule by mouth once daily    SERTRALINE (ZOLOFT) 25 MG TABLET    take 1 tablet by mouth once daily   These Medications have changed    No medications on file   Stop Taking    ALBUTEROL (PROVENTIL HFA, VENTOLIN HFA, PROAIR HFA) 90 MCG/ACTUATION INHALER    Take 2 Puffs by inhalation every four (4) hours as needed for Wheezing. ERGOCALCIFEROL (DRISDOL) 1,250 MCG (50,000 UNIT) CAPSULE    Take 1 Cap by mouth every seven (7) days. FENOFIBRATE NANOCRYSTALLIZED (TRICOR) 145 MG TABLET    Take 1 Tab by mouth nightly. METFORMIN (GLUCOPHAGE) 500 MG TABLET    Take 1 Tab by mouth daily (with breakfast). MONTELUKAST (SINGULAIR) 10 MG TABLET    Take 1 Tab by mouth daily.

## 2021-05-05 NOTE — ED NOTES
Pt reports dx with Covid on 4/28, states has continued to feel fatigued and short of breath. Pt able to speak in full sentences, without any signs of distress. Pt in NAD.

## 2021-05-06 ENCOUNTER — PATIENT OUTREACH (OUTPATIENT)
Dept: CASE MANAGEMENT | Age: 56
End: 2021-05-06

## 2021-05-06 NOTE — PROGRESS NOTES
Date/Time:  5/6/2021 9:01 AM   Call within 2 business days of discharge: Yes   Attempted to reach patient by telephone. Left HIPPA compliant message requesting a return call. Will attempt to reach patient again.
No

## 2021-05-10 ENCOUNTER — PATIENT OUTREACH (OUTPATIENT)
Dept: CASE MANAGEMENT | Age: 56
End: 2021-05-10

## 2021-05-10 NOTE — PROGRESS NOTES
Date/Time:  5/10/2021 9:42 AM   Call within 2 business days of discharge: Yes   2nd attempt to reach patient by telephone. Left HIPPA compliant message requesting a return call. This episode is resolved.

## 2021-07-23 DIAGNOSIS — I10 ESSENTIAL HYPERTENSION: ICD-10-CM

## 2021-07-23 RX ORDER — METOPROLOL SUCCINATE 25 MG/1
TABLET, EXTENDED RELEASE ORAL
Qty: 30 TABLET | Refills: 6 | Status: SHIPPED | OUTPATIENT
Start: 2021-07-23 | End: 2022-02-24 | Stop reason: SDUPTHER

## 2021-07-23 RX ORDER — NIFEDIPINE 30 MG/1
TABLET, EXTENDED RELEASE ORAL
Qty: 30 TABLET | Refills: 6 | Status: SHIPPED | OUTPATIENT
Start: 2021-07-23 | End: 2022-02-24 | Stop reason: SDUPTHER

## 2021-08-27 DIAGNOSIS — E78.2 MIXED HYPERLIPIDEMIA: ICD-10-CM

## 2021-08-27 DIAGNOSIS — I25.10 ATHEROSCLEROSIS OF NATIVE CORONARY ARTERY OF NATIVE HEART WITHOUT ANGINA PECTORIS: ICD-10-CM

## 2021-08-27 RX ORDER — ATORVASTATIN CALCIUM 10 MG/1
10 TABLET, FILM COATED ORAL DAILY
Qty: 90 TABLET | Refills: 2 | Status: SHIPPED | OUTPATIENT
Start: 2021-08-27 | End: 2022-05-27

## 2021-09-24 RX ORDER — BUSPIRONE HYDROCHLORIDE 5 MG/1
5 TABLET ORAL 2 TIMES DAILY
Qty: 180 TABLET | Refills: 0 | Status: SHIPPED | OUTPATIENT
Start: 2021-09-24 | End: 2022-03-28

## 2021-10-13 ENCOUNTER — OFFICE VISIT (OUTPATIENT)
Dept: FAMILY MEDICINE CLINIC | Age: 56
End: 2021-10-13
Payer: COMMERCIAL

## 2021-10-13 VITALS
HEIGHT: 65 IN | BODY MASS INDEX: 36.65 KG/M2 | OXYGEN SATURATION: 99 % | TEMPERATURE: 97.6 F | DIASTOLIC BLOOD PRESSURE: 80 MMHG | SYSTOLIC BLOOD PRESSURE: 118 MMHG | RESPIRATION RATE: 16 BRPM | WEIGHT: 220 LBS | HEART RATE: 87 BPM

## 2021-10-13 DIAGNOSIS — Z12.31 ENCOUNTER FOR SCREENING MAMMOGRAM FOR MALIGNANT NEOPLASM OF BREAST: ICD-10-CM

## 2021-10-13 DIAGNOSIS — E11.9 DIABETES MELLITUS, NEW ONSET (HCC): ICD-10-CM

## 2021-10-13 DIAGNOSIS — I10 ESSENTIAL HYPERTENSION: Primary | ICD-10-CM

## 2021-10-13 DIAGNOSIS — E07.9 THYROID CONDITION: ICD-10-CM

## 2021-10-13 DIAGNOSIS — E66.01 SEVERE OBESITY (BMI 35.0-35.9 WITH COMORBIDITY) (HCC): ICD-10-CM

## 2021-10-13 DIAGNOSIS — E78.1 HYPERTRIGLYCERIDEMIA: ICD-10-CM

## 2021-10-13 DIAGNOSIS — K58.9 IRRITABLE BOWEL SYNDROME, UNSPECIFIED TYPE: ICD-10-CM

## 2021-10-13 DIAGNOSIS — Z11.59 NEED FOR HEPATITIS C SCREENING TEST: ICD-10-CM

## 2021-10-13 DIAGNOSIS — K21.9 GASTROESOPHAGEAL REFLUX DISEASE WITHOUT ESOPHAGITIS: ICD-10-CM

## 2021-10-13 DIAGNOSIS — G89.29 OTHER CHRONIC PAIN: ICD-10-CM

## 2021-10-13 DIAGNOSIS — E55.9 VITAMIN D DEFICIENCY: ICD-10-CM

## 2021-10-13 DIAGNOSIS — F41.9 ANXIETY: ICD-10-CM

## 2021-10-13 PROCEDURE — 99214 OFFICE O/P EST MOD 30 MIN: CPT | Performed by: FAMILY MEDICINE

## 2021-10-13 RX ORDER — OMEPRAZOLE 20 MG/1
20 CAPSULE, DELAYED RELEASE ORAL DAILY
Qty: 90 CAPSULE | Refills: 1 | Status: SHIPPED | OUTPATIENT
Start: 2021-10-13 | End: 2022-04-05

## 2021-10-13 RX ORDER — METFORMIN HYDROCHLORIDE 500 MG/1
500 TABLET ORAL 2 TIMES DAILY WITH MEALS
Qty: 60 TABLET | Refills: 1 | Status: SHIPPED | OUTPATIENT
Start: 2021-10-13 | End: 2022-03-18

## 2021-10-13 RX ORDER — INDOMETHACIN 50 MG/1
CAPSULE ORAL
Qty: 60 CAPSULE | Refills: 0 | Status: SHIPPED | OUTPATIENT
Start: 2021-10-13 | End: 2022-07-07 | Stop reason: SDUPTHER

## 2021-10-13 NOTE — PATIENT INSTRUCTIONS
Nutrition Tips for Diabetes: After Your Visit  Your Care Instructions  A healthy diet is important to manage diabetes. It helps you lose weight (if you need to) and keep it off. It gives you the nutrition and energy your body needs and helps prevent heart disease. But a diet for diabetes does not mean that you have to eat special foods. You can eat what your family eats, including occasional sweets and other favorites. But you do have to pay attention to how often you eat and how much you eat of certain foods. The right plan for you will give you meals that help you keep your blood sugar at healthy levels. Try to eat a variety of foods and to spread carbohydrate throughout the day. Carbohydrate raises blood sugar higher and more quickly than any other nutrient does. Carbohydrate is found in sugar, breads and cereals, fruit, starchy vegetables such as potatoes and corn, and milk and yogurt. You may want to work with a dietitian or diabetes educator to help you plan meals and snacks. A dietitian or diabetes educator also can help you lose weight if that is one of your goals. The following tips can help you enjoy your meals and stay healthy. Follow-up care is a key part of your treatment and safety. Be sure to make and go to all appointments, and call your doctor if you are having problems. Its also a good idea to know your test results and keep a list of the medicines you take. How can you care for yourself at home? · Learn which foods have carbohydrate and how much carbohydrate to eat. A dietitian or diabetes educator can help you learn to keep track of how much carbohydrate you eat. · Spread carbohydrate throughout the day. Eat some carbohydrate at all meals, but do not eat too much at any one time. · Plan meals to include food from all the food groups.  These are the food groups and some example portion sizes:  ¨ Grains: 1 slice of bread (1 ounce), ½ cup of cooked cereal, and 1/3 cup of cooked pasta or rice. These have about 15 grams of carbohydrate in a serving. Choose whole grains such as whole wheat bread or crackers, oatmeal, and brown rice more often than refined grains. ¨ Fruit: 1 small fresh fruit, such as an apple or orange; ½ of a banana; ½ cup of chopped, cooked, or canned fruit; ½ cup of fruit juice; 1 cup of melon or raspberries; and 2 tablespoons of dried fruit. These have about 15 grams of carbohydrate in a serving. ¨ Dairy: 1 cup of nonfat or low-fat milk and 2/3 cup of plain yogurt. These have about 15 grams of carbohydrate in a serving. ¨ Protein foods: Beef, chicken, turkey, fish, eggs, tofu, cheese, cottage cheese, and peanut butter. A serving size of meat is 3 ounces, which is about the size of a deck of cards. Examples of meat substitute serving sizes (equal to 1 ounce of meat) are 1/4 cup of cottage cheese, 1 egg, 1 tablespoon of peanut butter, and ½ cup of tofu. These have very little or no carbohydrate per serving. ¨ Vegetables: Starchy vegetables such as ½ cup of cooked dried beans, peas, potatoes, or corn have about 15 grams of carbohydrate. Nonstarchy vegetables have very little carbohydrate, such as 1 cup of raw leafy vegetables (such as spinach), ½ cup of other vegetables (cooked or chopped), and 3/4 cup of vegetable juice. · Use the plate format to plan meals. It is a good, quick way to make sure that you have a balanced meal. It also helps you spread carbohydrate throughout the day. You divide your plate by types of foods. Put vegetables on half the plate, meat or meat substitutes on one-quarter of the plate, and a grain or starchy vegetable (such as brown rice or a potato) in the final quarter of the plate. To this you can add a small piece of fruit and 1 cup of milk or yogurt, depending on how much carbohydrate you are supposed to eat at a meal.  · Talk to your dietitian or diabetes educator about ways to add limited amounts of sweets into your meal plan.  You can eat these foods now and then, as long as you include the amount of carbohydrate they have in your daily carbohydrate allowance. · If you drink alcohol, limit it to no more than 1 drink a day for women and 2 drinks a day for men. If you are pregnant, no amount of alcohol is known to be safe. · Protein, fat, and fiber do not raise blood sugar as much as carbohydrate does. If you eat a lot of these nutrients in a meal, your blood sugar will rise more slowly than it would otherwise. · Limit saturated fats, such as those from meat and dairy products. Try to replace it with monounsaturated fat, such as olive oil. This is a healthier choice because people who have diabetes are at higher-than-average risk of heart disease. But use a modest amount of olive oil. A tablespoon of olive oil has 14 grams of fat and 120 calories. · Exercise lowers blood sugar. If you take insulin by shots or pump, you can use less than you would if you were not exercising. Keep in mind that timing matters. If you exercise within 1 hour after a meal, your body may need less insulin for that meal than it would if you exercised 3 hours after the meal. Test your blood sugar to find out how exercise affects your need for insulin. · Exercise on most days of the week. Aim for at least 30 minutes. Exercise helps you stay at a healthy weight and helps your body use insulin. Walking is an easy way to get exercise. Gradually increase the amount you walk every day. You also may want to swim, bike, or do other activities. When you eat out  · Learn to estimate the serving sizes of foods that have carbohydrate. If you measure food at home, it will be easier to estimate the amount in a serving of restaurant food. · If the meal you order has too much carbohydrate (such as potatoes, corn, or baked beans), ask to have a low-carbohydrate food instead. Ask for a salad or green vegetables.   · If you use insulin, check your blood sugar before and after eating out to help you plan how much to eat in the future. · If you eat more carbohydrate at a meal than you had planned, take a walk or do other exercise. This will help lower your blood sugar. Where can you learn more? Go to Expertcloud.de.be  Enter I921 in the search box to learn more about \"Nutrition Tips for Diabetes: After Your Visit. \"   © 2418-1404 Healthwise, Incorporated. Care instructions adapted under license by [facility] (which disclaims liability or warranty for this information). This care instruction is for use with your licensed healthcare professional. If you have questions about a medical condition or this instruction, always ask your healthcare professional. [address] any warranty or liability for your use of this information. Content Version: 90.4.033171; Current as of: June 4, 2014                 Learning About Low-Fat Eating  What is low-fat eating? Most food has some fat in it. Your body needs some fat to be healthy. But some kinds of fats are healthier than others. In a low-fat eating plan, you try to choose healthier fats and eat fewer unhealthy fats. Healthy fats include olive and canola oil. Try to avoid eating too much saturated fat, such as in cheese and meats. You do not need to cut all fat from your diet. But you can make healthier choices about the types and amount of fat you eat. Even though it is a good idea to choose healthier fats, it is still important to be careful of how much fat you eat, because all fats are high in calories. What are the different types of fats? Unhealthy fat  · Saturated fat. Saturated fats are mostly in animal foods, such as meat and dairy foods. Tropical oils, such as coconut oil, palm oil, and cocoa butter, are also saturated fats. Healthy fats  · Monounsaturated fat. Monounsaturated fats are liquid at room temperature but get solid when refrigerated.  Eating foods that are high in this fat may help lower your \"bad\" (LDL) cholesterol, keep your \"good\" (HDL) cholesterol level up, and lower your chances of getting coronary artery disease. This fat is found in canola oil, olive oil, peanut oil, olives, avocados, nuts, and nut butters. · Polyunsaturated fat. Polyunsaturated fats are liquid at room temperature. They are in safflower, sunflower, and corn oils. They are also the main fat in seafood. Omega-3 fatty acids are types of polyunsaturated fat. Eating fish may lower your chances of getting coronary artery disease. Fatty fish such as salmon and mackerel contain these healthy fatty acids. So do ground flaxseeds and flaxseed oil, soybeans, walnuts, and seeds. Why cut down on unhealthy fats? Eating foods that contain saturated fats can raise the LDL (\"bad\") cholesterol in your blood. Having a high level of LDL cholesterol increases your chance of hardening of the arteries (atherosclerosis), which can lead to heart disease, heart attack, and stroke. In general:  · No more than 10% of your daily calories should come from saturated fat. This is about 20 grams in a 2,000-calorie diet. · No more than 10% of your daily calories should come from polyunsaturated fat. This is about 20 grams in a 2,000-calorie diet. · Monounsaturated fats can be up to 15% of your daily calories. This is about 25 to 30 grams in a 2,000-calorie diet. If you're not sure how much fat you should be eating or how many calories you need each day to stay at a healthy weight, talk to a registered dietitian. A dietitian can help you create a plan that's right for you. What can you do to cut down on fat? Foods like cheese, butter, sausage, and desserts can have a lot of unhealthy fats. Try these tips for healthier meals at home and when you eat out. At home  · Fill up on fruits, vegetables, and whole grains.   · Think of meat as a side dish instead of as the main part of your meal.  · When you do eat meat, make it extra-lean ground beef (97% lean), ground turkey breast (without skin added), meats with fat trimmed off before cooking, or skinless chicken. · Try main dishes that use whole wheat pasta, brown rice, dried beans, or vegetables. · Use cooking methods that use little or no fat, such as broiling, steaming, or grilling. Use cooking spray instead of oil. If you use oil, use a monounsaturated oil, such as canola or olive oil. · Read food labels on canned, bottled, or packaged foods. Choose those with little saturated fat. When eating out at a restaurant  · Order foods that are broiled or poached instead of fried or breaded. · Cut back on the amount of butter or margarine that you use on bread. Use small amounts of olive oil instead. · Order sauces, gravies, and salad dressings on the side, and use only a little. · When you order pasta, choose tomato sauce instead of cream sauce. · Ask for salsa with your baked potato instead of sour cream, butter, cheese, or retana. Where can you learn more? Go to http://www.gray.com/  Enter [id number] in the search box to learn more about \"Learning About Low-Fat Eating. \"  Current as of: December 17, 2020               Content Version: 13.0  © 2006-2021 Healthwise, Incorporated. Care instructions adapted under license by [employer] (which disclaims liability or warranty for this information). If you have questions about a medical condition or this instruction, always ask your healthcare professional. [place] [address] any warranty or liability for your use of this information. A Healthy Lifestyle: Care Instructions  Your Care Instructions     A healthy lifestyle can help you feel good, stay at a healthy weight, and have plenty of energy for both work and play. A healthy lifestyle is something you can share with your whole family.   A healthy lifestyle also can lower your risk for serious health problems, such as high blood pressure, heart disease, and diabetes. You can follow a few steps listed below to improve your health and the health of your family. Follow-up care is a key part of your treatment and safety. Be sure to make and go to all appointments, and call your doctor if you are having problems. It's also a good idea to know your test results and keep a list of the medicines you take. How can you care for yourself at home? · Do not eat too much sugar, fat, or fast foods. You can still have dessert and treats now and then. The goal is moderation. · Start small to improve your eating habits. Pay attention to portion sizes, drink less juice and soda pop, and eat more fruits and vegetables. ? Eat a healthy amount of food. A 3-ounce serving of meat, for example, is about the size of a deck of cards. Fill the rest of your plate with vegetables and whole grains. ? Limit the amount of soda and sports drinks you have every day. Drink more water when you are thirsty. ? Eat plenty of fruits and vegetables every day. Have an apple or some carrot sticks as an afternoon snack instead of a candy bar. Try to have fruits and/or vegetables at every meal.  · Make exercise part of your daily routine. You may want to start with simple activities, such as walking, bicycling, or slow swimming. Try to be active 30 to 60 minutes every day. You do not need to do all 30 to 60 minutes all at once. For example, you can exercise 3 times a day for 10 or 20 minutes. Moderate exercise is safe for most people, but it is always a good idea to talk to your doctor before starting an exercise program.  · Keep moving. [place] the lawn, work in the garden, or [place]. Take the stairs instead of the elevator at work. · If you smoke, quit. People who smoke have an increased risk for heart attack, stroke, cancer, and other lung illnesses. Quitting is hard, but there are ways to boost your chance of quitting tobacco for good. ? Use nicotine gum, patches, or lozenges. ?  Ask your doctor about stop-smoking programs and medicines. ? Keep trying. In addition to reducing your risk of diseases in the future, you will notice some benefits soon after you stop using tobacco. If you have shortness of breath or asthma symptoms, they will likely get better within a few weeks after you quit. · Limit how much alcohol you drink. Moderate amounts of alcohol (up to 2 drinks a day for men, 1 drink a day for women) are okay. But drinking too much can lead to liver problems, high blood pressure, and other health problems. Family health  If you have a family, there are many things you can do together to improve your health. · Eat meals together as a family as often as possible. · Eat healthy foods. This includes fruits, vegetables, lean meats and dairy, and whole grains. · Include your family in your fitness plan. Most people think of activities such as jogging or tennis as the way to fitness, but there are many ways you and your family can be more active. Anything that makes you breathe hard and gets your heart pumping is exercise. Here are some tips:  ? Walk to do errands or to take your child to school or the bus.  ? Go for a family bike ride after dinner instead of watching TV. Where can you learn more? Go to http://www.gray.com/  Enter O100 in the search box to learn more about \"A Healthy Lifestyle: Care Instructions. \"  Current as of: June 16, 2021               Content Version: 13.0  © 6104-2650 Healthwise, Incorporated. Care instructions adapted under license by [employer] (which disclaims liability or warranty for this information). If you have questions about a medical condition or this instruction, always ask your healthcare professional. [address] any warranty or liability for your use of this information.

## 2021-10-13 NOTE — PROGRESS NOTES
Chief Complaint   Patient presents with    Diabetes    Hypertension    Cholesterol Problem    GERD    Anxiety    Thyroid Problem     1. \"Have you been to the ER, urgent care clinic since your last visit? Hospitalized since your last visit? \" 5/5/21- Covid +    2. \"Have you seen or consulted any other health care providers outside of the [address] [place] [address] since your last visit? \" No     3. For patients aged 39-70: Has the patient had a colonoscopy? No     If the patient is female:    4. For patients aged 41-77: Has the patient had a mammogram within the past 2 years? No    5. For patients aged 21-65: Has the patient had a pap smear?  No-partial hysterectomy

## 2021-10-13 NOTE — PROGRESS NOTES
HISTORY OF PRESENT ILLNESS  Narda Florian is a 49 y.o. female. HPI: Here for follow-up. History of noncompliance with the follow-ups and medication. Trying to be better. History of hypertension. Taking medication with compliance. Vitals been stable. She is asymptomatic. During visit sitting comfortable without any acute distress. She does have borderline diabetes. Did not want to try any medication last visit. Mentioned that she would go with the lifestyle modification but being noncompliant with diet modification and exercise. Discussed high BMI. Discussed importance of weight loss. She will work on it. I have discussed her to start the Metformin and she agrees with it and since being noncompliant with lifestyle modification. Discussed medication side effects. She does have a history of anxiety and IBS. It has been stable at this time. Discussed the side effect of GI upset with Metformin and if she feels that she will call back and we will change the medication. Hypertriglyceridemia. On statin. No side effects. Again noncompliant with the diet modification and exercise. History of GERD. On and off. Stable on PPI. No abdominal pain or nausea vomiting or any weight changes. History of anxiety. Fairly stable. No panic attacks. Taking medication with compliance. Denies any headache, dizziness, no chest pain or trouble breathing, no arm or leg weakness. No nausea or vomiting, no weight or appetite changes, no mood changes . No urine or bowel complains, no palpitation, no diaphoresis. No abdominal pain. No cold or cough. No leg swelling. No fever. No sleep trouble. Visit Vitals  /80 (BP 1 Location: Left arm, BP Patient Position: Sitting, BP Cuff Size: Large adult)   Pulse 87   Temp 97.6 °F (36.4 °C) (Temporal)   Resp 16   Ht 5' 5\" (1.651 m)   Wt 220 lb (99.8 kg)   SpO2 99%   BMI 36.61 kg/m²     Review medication list, vitals, problem list,allergies. Review prior labs. Lab Results   Component Value Date/Time    WBC 4.9 05/05/2021 03:42 PM    HGB 13.4 05/05/2021 03:42 PM    HCT 41.3 05/05/2021 03:42 PM    PLATELET 520 80/99/5105 03:42 PM    MCV 86.9 05/05/2021 03:42 PM     Lab Results   Component Value Date/Time    Sodium 138 05/05/2021 03:42 PM    Potassium 3.7 05/05/2021 03:42 PM    Chloride 105 05/05/2021 03:42 PM    CO2 25 05/05/2021 03:42 PM    Anion gap 8 05/05/2021 03:42 PM    Glucose 111 (H) 05/05/2021 03:42 PM    BUN 12 05/05/2021 03:42 PM    Creatinine 0.92 05/05/2021 03:42 PM    BUN/Creatinine ratio 13 05/05/2021 03:42 PM    GFR est AA >60 05/05/2021 03:42 PM    GFR est non-AA >60 05/05/2021 03:42 PM    Calcium 9.1 05/05/2021 03:42 PM    Bilirubin, total 0.3 03/12/2021 12:37 PM    Alk. phosphatase 141 (H) 03/12/2021 12:37 PM    Protein, total 7.8 03/12/2021 12:37 PM    Albumin 3.8 03/12/2021 12:37 PM    Globulin 4.0 03/12/2021 12:37 PM    A-G Ratio 1.0 03/12/2021 12:37 PM    ALT (SGPT) 28 03/12/2021 12:37 PM    AST (SGOT) 12 03/12/2021 12:37 PM     Lab Results   Component Value Date/Time    Cholesterol, total 209 (H) 03/12/2021 12:37 PM    HDL Cholesterol 46 03/12/2021 12:37 PM    LDL, calculated 100.8 (H) 03/12/2021 12:37 PM    VLDL, calculated 62.2 03/12/2021 12:37 PM    Triglyceride 311 (H) 03/12/2021 12:37 PM    CHOL/HDL Ratio 4.5 03/12/2021 12:37 PM     Lab Results   Component Value Date/Time    TSH 1.64 03/12/2021 12:34 PM     Lab Results   Component Value Date/Time    Hemoglobin A1c 6.6 (H) 03/12/2021 12:34 PM       Lab Results   Component Value Date/Time    Vitamin D 25-Hydroxy 11.1 (L) 03/12/2021 12:34 PM           ROS: See HPI    Physical Exam  Constitutional:       General: She is not in acute distress. Cardiovascular:      Rate and Rhythm: Normal rate and regular rhythm. Heart sounds: Normal heart sounds. Abdominal:      General: Bowel sounds are normal.      Palpations: Abdomen is soft. Tenderness: There is no abdominal tenderness. Musculoskeletal:         General: No swelling. Cervical back: Neck supple. Neurological:      Mental Status: She is oriented to person, place, and time. Psychiatric:         Behavior: Behavior normal.         ASSESSMENT and PLAN    ICD-10-CM ICD-9-CM    1. Essential hypertension:well controlled. Continue current dose of medication and low salt diet. Exercise as tolerated. I10 401.9    2. Gastroesophageal reflux disease without esophagitis: On PPI. Send her to GI for further evaluation but she has not made an appointment yet. Given the contact number for GI to make an appointment K21.9 530.81 omeprazole (PRILOSEC) 20 mg capsule   3. Anxiety: Fairly stable on current medication F41.9 300.00    4. Irritable bowel syndrome, unspecified type: Stable on symptomatic treatment. Probiotic K58.9 564.1    5. Thyroid condition/ not on any medication: Last TSH within normal limit. She is asymptomatic. Will observe E07.9 246.9    6. Diabetes mellitus, new onset ([facility] [address]): Started Metformin. Noncompliant with lifestyle modification which she will start again with the diet modification. Discussed the importance of weight loss as well. She is on statin E11.9 250.00 MICROALBUMIN, UR, RAND W/ MICROALB/CREAT RATIO      HEMOGLOBIN A1C WITH EAG      METABOLIC PANEL, COMPREHENSIVE   7. Severe obesity (BMI 35.0-35.9 with comorbidity) [facility]): See above E66.01 278.01     Z68.35 V85.35    8. Hypertriglyceridemia: On statin. Will recheck labs E78.1 272.1 LIPID PANEL      METABOLIC PANEL, COMPREHENSIVE   9. Encounter for screening mammogram for malignant neoplasm of breast  Z12.31 V76.12 [facility] MAMMO BI SCREENING INCL CAD   8. Vitamin D deficiency: Not on supplement will recheck E55.9 268.9 VITAMIN D, 25 HYDROXY   11.  Need for hepatitis C screening test  Z11.59 V73.89 HEPATITIS C AB   12. Other chronic pain/ lower back on and off: Given medication refill as needed G89.29 338.29 indomethacin (INDOCIN) 50 mg capsule   Patient understood agree with the plan  Reminded her to complete the mammogram  She will make an appointment with GI for colon cancer screening and GERD symptom  She has an appointment with the eye exam    Follow-up and Dispositions    · Return in about 3 months (around 1/13/2022). Please note that this dictation was completed with Cognea, the computer voice recognition software. Quite often unanticipated grammatical, syntax, homophones, and other interpretive errors are inadvertently transcribed by the computer software. Please disregard these errors. Please excuse any errors that have escaped final proofreading.

## 2021-12-03 DIAGNOSIS — F41.9 ANXIETY: ICD-10-CM

## 2021-12-05 DIAGNOSIS — I10 ESSENTIAL HYPERTENSION: ICD-10-CM

## 2021-12-07 RX ORDER — LISINOPRIL 20 MG/1
TABLET ORAL
Qty: 90 TABLET | Refills: 0 | Status: SHIPPED | OUTPATIENT
Start: 2021-12-07 | End: 2022-03-07 | Stop reason: SDUPTHER

## 2021-12-09 DIAGNOSIS — K58.8 OTHER IRRITABLE BOWEL SYNDROME: ICD-10-CM

## 2021-12-09 DIAGNOSIS — F41.9 ANXIETY: ICD-10-CM

## 2021-12-09 RX ORDER — DICYCLOMINE HYDROCHLORIDE 20 MG/1
TABLET ORAL
Qty: 90 TABLET | Refills: 0 | Status: CANCELLED | OUTPATIENT
Start: 2021-12-09

## 2021-12-09 RX ORDER — DICYCLOMINE HYDROCHLORIDE 20 MG/1
TABLET ORAL
Qty: 90 TABLET | Refills: 0 | Status: SHIPPED | OUTPATIENT
Start: 2021-12-09 | End: 2022-03-28

## 2021-12-09 RX ORDER — SERTRALINE HYDROCHLORIDE 25 MG/1
25 TABLET, FILM COATED ORAL DAILY
Qty: 90 TABLET | Refills: 0 | Status: SHIPPED | OUTPATIENT
Start: 2021-12-09 | End: 2021-12-27 | Stop reason: SDUPTHER

## 2021-12-09 NOTE — TELEPHONE ENCOUNTER
Verbal order given by Dr. Amelia Davis for approval for zoloft and Bentyl to be refilled. Patient identified with 2 identifiers (name and ).  Patient aware medication has been E scribed to [employer]

## 2021-12-27 RX ORDER — SERTRALINE HYDROCHLORIDE 25 MG/1
TABLET, FILM COATED ORAL
Qty: 90 TABLET | Refills: 0 | Status: SHIPPED | OUTPATIENT
Start: 2021-12-27 | End: 2022-06-10 | Stop reason: SDUPTHER

## 2022-02-01 ENCOUNTER — TELEPHONE (OUTPATIENT)
Dept: MAMMOGRAPHY | Age: 57
End: 2022-02-01

## 2022-02-01 NOTE — TELEPHONE ENCOUNTER
I called  Estefania Luna  To assist  this her  in scheduling a Mammogram . I left a message for this patient to return my call  at [phone].     Terell Dahl LPN   Panel Manager

## 2022-02-18 DIAGNOSIS — I10 ESSENTIAL HYPERTENSION: ICD-10-CM

## 2022-02-23 RX ORDER — METOPROLOL SUCCINATE 25 MG/1
TABLET, EXTENDED RELEASE ORAL
Qty: 30 TABLET | Refills: 6 | OUTPATIENT
Start: 2022-02-23

## 2022-02-23 RX ORDER — NIFEDIPINE 30 MG/1
TABLET, EXTENDED RELEASE ORAL
Qty: 30 TABLET | Refills: 6 | OUTPATIENT
Start: 2022-02-23

## 2022-02-24 RX ORDER — METOPROLOL SUCCINATE 25 MG/1
25 TABLET, EXTENDED RELEASE ORAL DAILY
Qty: 90 TABLET | Refills: 1 | Status: SHIPPED | OUTPATIENT
Start: 2022-02-24 | End: 2022-08-19

## 2022-02-24 RX ORDER — NIFEDIPINE 30 MG/1
30 TABLET, EXTENDED RELEASE ORAL DAILY
Qty: 90 TABLET | Refills: 1 | Status: SHIPPED | OUTPATIENT
Start: 2022-02-24 | End: 2022-08-19

## 2022-03-18 ENCOUNTER — OFFICE VISIT (OUTPATIENT)
Dept: CARDIOLOGY CLINIC | Age: 57
End: 2022-03-18
Payer: COMMERCIAL

## 2022-03-18 VITALS
HEART RATE: 87 BPM | DIASTOLIC BLOOD PRESSURE: 77 MMHG | HEIGHT: 65 IN | SYSTOLIC BLOOD PRESSURE: 129 MMHG | OXYGEN SATURATION: 95 % | WEIGHT: 230 LBS | BODY MASS INDEX: 38.32 KG/M2

## 2022-03-18 DIAGNOSIS — E78.2 MIXED HYPERLIPIDEMIA: ICD-10-CM

## 2022-03-18 DIAGNOSIS — Q24.5 ANOMALOUS CORONARY ARTERY ORIGIN: Primary | ICD-10-CM

## 2022-03-18 DIAGNOSIS — I25.10 CORONARY ARTERY CALCIFICATION: ICD-10-CM

## 2022-03-18 DIAGNOSIS — I10 ESSENTIAL HYPERTENSION: ICD-10-CM

## 2022-03-18 DIAGNOSIS — E66.01 SEVERE OBESITY (BMI 35.0-39.9) WITH COMORBIDITY (HCC): ICD-10-CM

## 2022-03-18 DIAGNOSIS — I25.84 CORONARY ARTERY CALCIFICATION: ICD-10-CM

## 2022-03-18 PROCEDURE — 99214 OFFICE O/P EST MOD 30 MIN: CPT | Performed by: INTERNAL MEDICINE

## 2022-03-18 NOTE — PROGRESS NOTES
HISTORY OF PRESENT ILLNESS  Eugenie Ramírez is a 49 y.o. female. 10/2019  Patient is here for follow up of diagnostic tests. Results will be discussed. Hypertension  The history is provided by the patient. This is a chronic problem. The problem occurs constantly. Associated symptoms include shortness of breath. Dizziness  The history is provided by the patient and medical records. This is a recurrent problem. The current episode started more than 1 week ago. The problem occurs every several days. The problem has been gradually worsening. Associated symptoms include shortness of breath. Nothing aggravates the symptoms. Nothing relieves the symptoms. Shortness of Breath  The history is provided by the patient and medical records. This is a recurrent problem. The problem occurs continuously. The current episode started more than 1 week ago. The problem has been gradually worsening. Pertinent negatives include no cough, no wheezing, no PND, no orthopnea, no vomiting, no leg swelling and no claudication.      Family History   Problem Relation Age of Onset    Heart Attack Mother     Coronary Art Dis Mother     Heart Attack Father     Coronary Art Dis Brother     Cancer Maternal Grandmother     Heart Attack Paternal Grandfather        Past Medical History:   Diagnosis Date    COVID-19     Diabetes ([address]     Pre diabetes    Essential hypertension     GERD (gastroesophageal reflux disease)     Glaucoma     Hyperlipidemia     Hypertension     Nausea & vomiting     nausea       Past Surgical History:   Procedure Laterality Date    HX CHOLECYSTECTOMY      HX CHOLECYSTECTOMY  2003    HX DILATION AND CURETTAGE  8/14    hysterectomy    HX HEENT      glaucoma    HX ORTHOPAEDIC      back    HX PARTIAL HYSTERECTOMY         Social History     Tobacco Use    Smoking status: Never Smoker    Smokeless tobacco: Never Used   Substance Use Topics    Alcohol use: Not Currently       Allergies   Allergen Reactions    Albuterol Nausea and Vomiting    Codeine Other (comments)    Doxycycline Other (comments)    Morphine Other (comments)     Chest burning    Morphine Other (comments)     \"chest burning\"       Prior to Admission medications    Medication Sig Start Date End Date Taking? Authorizing Provider   lisinopriL (PRINIVIL, ZESTRIL) 20 mg tablet take 1 tablet by mouth once daily 3/7/22  Yes Minnie Hinkle MD   metoprolol succinate (TOPROL-XL) 25 mg XL tablet Take 1 Tablet by mouth daily. 2/24/22  Yes Irvin Kc NP   NIFEdipine ER (PROCARDIA XL) 30 mg ER tablet Take 1 Tablet by mouth daily. 2/24/22  Yes Irvin Kc NP   sertraline (ZOLOFT) 25 mg tablet take 1 tablet by mouth once daily 12/27/21  Yes Minnie Hinkle MD   dicyclomine (BENTYL) 20 mg tablet take 1 tablet by mouth every 6 hours if needed for ABDOMINAL CRAMPS 12/9/21  Yes Minnie Hinkle MD   omeprazole (PRILOSEC) 20 mg capsule Take 1 Capsule by mouth daily. 10/13/21  Yes Minnie Hinkle MD   indomethacin (INDOCIN) 50 mg capsule take 1 capsule by mouth twice a day if needed 10/13/21  Yes Minnie Hiknle MD   busPIRone (BUSPAR) 5 mg tablet Take 1 Tablet by mouth two (2) times a day. Patient taking differently: Take 5 mg by mouth daily. 9/24/21  Yes Minnie Hinkle MD   atorvastatin (LIPITOR) 10 mg tablet Take 1 Tablet by mouth daily. 8/27/21  Yes Kenna Strickland NP   Cetirizine (ZYRTEC) 10 mg cap Take 10 mg by mouth daily. 5/20/19  Yes Minnie Hinkle MD         Visit Vitals  /77 (BP 1 Location: Left upper arm, BP Patient Position: Sitting)   Pulse 87   Ht 5' 5\" (1.651 m)   Wt 104.3 kg (230 lb)   LMP 01/28/2015   SpO2 95%   BMI 38.27 kg/m²     Review of Systems   Constitutional: Positive for malaise/fatigue. Respiratory: Positive for shortness of breath. Negative for cough and wheezing. Cardiovascular: Positive for palpitations. Negative for orthopnea, claudication, leg swelling and PND.    Gastrointestinal: Negative for nausea and vomiting. Musculoskeletal: Negative for falls. Neurological: Positive for dizziness. Endo/Heme/Allergies: Does not bruise/bleed easily. Physical Exam  Vitals and nursing note reviewed. Constitutional:       Appearance: She is well-developed. Neck:      Vascular: No JVD. Cardiovascular:      Rate and Rhythm: Normal rate and regular rhythm. Pulses: Intact distal pulses. Heart sounds: Normal heart sounds. No murmur heard. No friction rub. No gallop. Pulmonary:      Effort: Pulmonary effort is normal. No respiratory distress. Breath sounds: Normal breath sounds. No wheezing or rales. Chest:      Chest wall: No tenderness. Abdominal:      General: There is no distension. Palpations: Abdomen is soft. There is no mass. Tenderness: There is no abdominal tenderness. Musculoskeletal:         General: Normal range of motion. Skin:     General: Skin is warm and dry. Neurological:      Mental Status: She is alert and oriented to person, place, and time. Echo 9/2019  Interpretation Summary        · Left Ventricle: Normal cavity size, systolic function (ejection fraction normal) and diastolic function. Mild concentric hypertrophy. Estimated left ventricular ejection fraction is 56 - 60%. No regional wall motion abnormality noted. · Right Ventricle: Normal right ventricular size and function. · Mitral Valve: Trace mitral valve regurgitation. · Tricuspid Valve: Mild tricuspid valve regurgitation is present. · Pulmonary Artery: There is no evidence of pulmonary hypertension. 10/2019  Nuclear Stress Test     Abnormal myocardial perfusion imaging. Myocardial perfusion imaging supports an intermediate risk stress test.   There is no prior study available for comparison. . Moderate intensity ,partially reversible anterior wal defect,possible artifact although can not rule out ischemia and disease of LAD.      Calcium score 5    EKG - SR  Conclusion 2019       · Normal epicardial coronary arteries. 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 A stress test was performed following the Keven protocol, with the patient reaching stage 2. The patient achieved the target heart rate. The test was stopped because the patient complained of fatigue. The patient reported no angina during stress. Onset of symptoms occurred at stage 0 of the protocol. The patient's response to exercise was adequate for diagnosis. Blood pressure demonstrated a normal response to exercise. Heart rate demonstrated maximal response to stress. Overall, the patient's exercise capacity was fair. Angina Index is 0. The Duke Treadmill score is 5 (low risk). Negative stress test.  CTA HEART WITH [address]  Other Result Information   This result has an attachment that is not available. Result Narrative   PATIENT: No Christopher  : 1965  --------------------  Impression  --------------------  1. 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  Consider correlation with ischemia testing. 2. No evidence of coronary atherosclerosis. Impression     Pulmonary Function Test    Flows:  Normal flows    Volumes:  FRC, RV and ERV are reduced    Flow Volume Loop:  Suggestive of mild restriction      Diffusion:  Normal Diffusion Capacity      Impression:  Mild restrictive ventilatory defect with normal spirometry and diffusion capacity. Comment:  See technicians comments. ASSESSMENT and PLAN    Ms. Angel Aaron has a reminder for a \"due or due soon\" health maintenance. I have asked that she contact her primary care provider for follow-up on this health maintenance. No flowsheet data found. Assessment         ICD-10-CM ICD-9-CM    1.  Anomalous coronary artery origin  Q24.5 746.85     Stable symptom monitor 2. Essential hypertension  I10 401.9 HEMOGLOBIN A1C WITH EAG    Controlled continue therapy   3. Severe obesity (BMI 35.0-39. 9) with comorbidity ([facility] [address]  E66.01 278.01     Continue dietary modification and exercise   4. Mixed hyperlipidemia  E78.2 272.2 HEPATIC FUNCTION PANEL      LIPID PANEL    Recheck labs continue treatment   5. Coronary artery calcification  I25.10 414.00 HEMOGLOBIN A1C WITH EAG    I25.84 414.4     Stable symptom     9/2019 Referred for episodes of dizziness with diaphoresis, nausea and SOB. Episodes occur with rest and last 10-30 minutes. She also reports occasional chest pain. She has strong family h/o CAD with mother having MI in 25s. Will obtain non-invasive evaluation due to symptoms and risk factors with stress test, echo, calcium score, event monitor and tilt table. To f/u post testing. 10/2019  Abnormal stress test with atypical chest pain strong family history and minimal calcium abnormality on CAT scan. Will proceed with cardiac catheterization for further evaluation. Decreased nifedipine due to positive tilt table test with vasodepressor response. Add low-dose Toprol. Hydration and stockings. 12/2019  Exertional shortness of breath and chest tightness. Anomalous right coronary artery will get a CT scan to evaluate for coronary course  12/2019  CTA showing malignant course of right coronary artery although reviewing all the data with normal perfusion in inferior wall and negative EKG stress test less likely that there is angina equivalent as a cause of her shortness of breath. I will get PFT. For time being continue control of blood pressure. Discussed option which would require surgical correction which we would hold off at this point    6/2020-virtual visit  Cardiac status stable.   Mild restrictive disease on PFT likely related to obesity continue diet exercise weight loss monitor anginal symptoms are stable      Medications Discontinued During This Encounter Medication Reason    metFORMIN (GLUCOPHAGE) 500 mg tablet Not A Current Medication       Orders Placed This Encounter    HEPATIC FUNCTION PANEL     Standing Status:   Future     Standing Expiration Date:   9/16/2022    LIPID PANEL     Standing Status:   Future     Standing Expiration Date:   9/16/2022    HEMOGLOBIN A1C WITH EAG     Standing Status:   Future     Standing Expiration Date:   3/19/2023       Follow-up and Dispositions    · Return in about 6 months (around 9/18/2022).        Beatriz Fernandez MD

## 2022-03-18 NOTE — PROGRESS NOTES
1. Have you been to the ER, urgent care clinic since your last visit? Hospitalized since your last visit? No    2. Have you seen or consulted any other health care providers outside of the [address] [place] [address] since your last visit? Include any pap smears or colon screening.       No

## 2022-03-19 PROBLEM — F41.9 ANXIETY: Status: ACTIVE | Noted: 2019-09-04

## 2022-03-19 PROBLEM — I10 ESSENTIAL HYPERTENSION: Status: ACTIVE | Noted: 2019-09-04

## 2022-03-19 PROBLEM — K21.9 GASTROESOPHAGEAL REFLUX DISEASE WITHOUT ESOPHAGITIS: Status: ACTIVE | Noted: 2019-09-04

## 2022-03-19 PROBLEM — Q24.5 ANOMALOUS CORONARY ARTERY ORIGIN: Status: ACTIVE | Noted: 2019-12-04

## 2022-03-19 PROBLEM — R94.39 ABNORMAL NUCLEAR STRESS TEST: Status: ACTIVE | Noted: 2019-10-30

## 2022-03-27 DIAGNOSIS — K58.8 OTHER IRRITABLE BOWEL SYNDROME: ICD-10-CM

## 2022-03-28 RX ORDER — DICYCLOMINE HYDROCHLORIDE 20 MG/1
TABLET ORAL
Qty: 90 TABLET | Refills: 0 | Status: SHIPPED | OUTPATIENT
Start: 2022-03-28 | End: 2022-05-26

## 2022-03-28 RX ORDER — BUSPIRONE HYDROCHLORIDE 5 MG/1
5 TABLET ORAL DAILY
Qty: 90 TABLET | Refills: 0 | Status: SHIPPED | OUTPATIENT
Start: 2022-03-28 | End: 2022-07-07 | Stop reason: SDUPTHER

## 2022-05-04 DIAGNOSIS — I25.10 ATHEROSCLEROSIS OF NATIVE CORONARY ARTERY OF NATIVE HEART WITHOUT ANGINA PECTORIS: ICD-10-CM

## 2022-05-04 DIAGNOSIS — E78.2 MIXED HYPERLIPIDEMIA: ICD-10-CM

## 2022-05-27 RX ORDER — ATORVASTATIN CALCIUM 10 MG/1
TABLET, FILM COATED ORAL
Qty: 90 TABLET | Refills: 2 | Status: SHIPPED | OUTPATIENT
Start: 2022-05-27 | End: 2022-07-07 | Stop reason: SDUPTHER

## 2022-06-10 DIAGNOSIS — F41.9 ANXIETY: ICD-10-CM

## 2022-06-10 DIAGNOSIS — K21.9 GASTROESOPHAGEAL REFLUX DISEASE WITHOUT ESOPHAGITIS: ICD-10-CM

## 2022-06-10 DIAGNOSIS — I10 ESSENTIAL HYPERTENSION: ICD-10-CM

## 2022-06-10 NOTE — TELEPHONE ENCOUNTER
This patient contacted office for the following prescriptions to be filled:    Last office visit: 10/13/2021  Follow up appointment: 6/21/2022  Medication requested :   Requested Prescriptions     Pending Prescriptions Disp Refills    sertraline (ZOLOFT) 25 mg tablet 90 Tablet 0     Sig: Take 1 Tablet by mouth daily.  lisinopriL (PRINIVIL, ZESTRIL) 20 mg tablet 90 Tablet 0     Sig: Take 1 Tablet by mouth daily.  omeprazole (PRILOSEC) 20 mg capsule 30 Capsule 1     Sig: Take 1 Capsule by mouth daily.      PCP: Mallory Bocanegra order or Local pharmacy name [facility] [phone]

## 2022-06-13 RX ORDER — OMEPRAZOLE 20 MG/1
20 CAPSULE, DELAYED RELEASE ORAL DAILY
Qty: 90 CAPSULE | Refills: 0 | Status: SHIPPED | OUTPATIENT
Start: 2022-06-13

## 2022-06-13 RX ORDER — LISINOPRIL 20 MG/1
20 TABLET ORAL DAILY
Qty: 90 TABLET | Refills: 0 | Status: SHIPPED | OUTPATIENT
Start: 2022-06-13

## 2022-06-13 RX ORDER — SERTRALINE HYDROCHLORIDE 25 MG/1
25 TABLET, FILM COATED ORAL DAILY
Qty: 90 TABLET | Refills: 0 | Status: SHIPPED | OUTPATIENT
Start: 2022-06-13 | End: 2022-09-12

## 2022-06-21 ENCOUNTER — OFFICE VISIT (OUTPATIENT)
Dept: FAMILY MEDICINE CLINIC | Age: 57
End: 2022-06-21
Payer: COMMERCIAL

## 2022-06-21 VITALS
SYSTOLIC BLOOD PRESSURE: 118 MMHG | BODY MASS INDEX: 37.99 KG/M2 | TEMPERATURE: 98.7 F | HEART RATE: 77 BPM | DIASTOLIC BLOOD PRESSURE: 80 MMHG | RESPIRATION RATE: 16 BRPM | OXYGEN SATURATION: 98 % | WEIGHT: 228 LBS | HEIGHT: 65 IN

## 2022-06-21 DIAGNOSIS — E11.9 DIABETES MELLITUS, NEW ONSET (HCC): Primary | ICD-10-CM

## 2022-06-21 DIAGNOSIS — I10 ESSENTIAL HYPERTENSION: ICD-10-CM

## 2022-06-21 DIAGNOSIS — Z12.11 SCREENING FOR COLON CANCER: ICD-10-CM

## 2022-06-21 DIAGNOSIS — Z11.59 NEED FOR HEPATITIS C SCREENING TEST: ICD-10-CM

## 2022-06-21 DIAGNOSIS — Z12.31 ENCOUNTER FOR SCREENING MAMMOGRAM FOR MALIGNANT NEOPLASM OF BREAST: ICD-10-CM

## 2022-06-21 DIAGNOSIS — G47.9 SLEEP TROUBLE: ICD-10-CM

## 2022-06-21 DIAGNOSIS — K21.9 GASTROESOPHAGEAL REFLUX DISEASE WITHOUT ESOPHAGITIS: ICD-10-CM

## 2022-06-21 DIAGNOSIS — K58.9 IRRITABLE BOWEL SYNDROME, UNSPECIFIED TYPE: ICD-10-CM

## 2022-06-21 DIAGNOSIS — F41.9 ANXIETY: ICD-10-CM

## 2022-06-21 DIAGNOSIS — E07.9 THYROID CONDITION: ICD-10-CM

## 2022-06-21 PROCEDURE — 99214 OFFICE O/P EST MOD 30 MIN: CPT | Performed by: FAMILY MEDICINE

## 2022-06-21 NOTE — PROGRESS NOTES
Chief Complaint   Patient presents with    Anxiety    Cholesterol Problem    Diabetes    Hypertension    Irritable Bowel Syndrome    Other     c/o left great toe some discomfort     Thyroid Problem    Vitamin D Deficiency       1. \"Have you been to the ER, urgent care clinic since your last visit? Hospitalized since your last visit? \" June 11, 2022 ED positive for COVID 19     2. \"Have you seen or consulted any other health care providers outside of the [address] [place] [address] since your last visit? \" No     3. For patients aged 39-70: Has the patient had a colonoscopy / FIT/ Cologuard? No      If the patient is female:    4. For patients aged 41-77: Has the patient had a mammogram within the past 2 years? No      5. For patients aged 21-65: Has the patient had a pap smear?  No

## 2022-06-21 NOTE — PROGRESS NOTES
HISTORY OF PRESENT ILLNESS  Ishaan Armenta is a 56 y.o. female. HPI: Noncompliant with follow-ups. Borderline diabetes. Done labs since last year. Advised and recommended to complete the lab work. Discussed with the patient that without lab results not able to give refill on medication. History of anxiety. Its been always like that since young age. No panic attack. Current medication is helping. Denies any headache, dizziness, no chest pain or trouble breathing, no arm or leg weakness. No nausea or vomiting, no weight or appetite changes, no mood changes . No urine or bowel complains, no palpitation, no diaphoresis. No abdominal pain. No cold or cough. No leg swelling. No fever. No sleep trouble. Hypertension. Vitals been stable. Taking medication with compliance and asymptomatic. Always had a sleep trouble due to mind is constantly thinking. Discussed the counseling and further evaluation but she does not want to go for it. IBS. Fairly stable. Also last thyroid function within normal limit. No trouble swallowing or change in voice. GERD symptom has been stable. No appetite or weight changes. Visit Vitals  /80 (BP 1 Location: Right arm, BP Patient Position: Sitting, BP Cuff Size: Large adult)   Pulse 77   Temp 98.7 °F (37.1 °C) (Temporal)   Resp 16   Ht 5' 5\" (1.651 m)   Wt 228 lb (103.4 kg)   SpO2 98%   BMI 37.94 kg/m²     Review medication list, vitals, problem list,allergies.    Lab Results   Component Value Date/Time    WBC 4.9 05/05/2021 03:42 PM    HGB 13.4 05/05/2021 03:42 PM    HCT 41.3 05/05/2021 03:42 PM    PLATELET 140 20/78/4508 03:42 PM    MCV 86.9 05/05/2021 03:42 PM     Lab Results   Component Value Date/Time    Sodium 138 05/05/2021 03:42 PM    Potassium 3.7 05/05/2021 03:42 PM    Chloride 105 05/05/2021 03:42 PM    CO2 25 05/05/2021 03:42 PM    Anion gap 8 05/05/2021 03:42 PM    Glucose 111 (H) 05/05/2021 03:42 PM    BUN 12 05/05/2021 03:42 PM    Creatinine 0.92 05/05/2021 03:42 PM    BUN/Creatinine ratio 13 05/05/2021 03:42 PM    GFR est AA >60 05/05/2021 03:42 PM    GFR est non-AA >60 05/05/2021 03:42 PM    Calcium 9.1 05/05/2021 03:42 PM    Bilirubin, total 0.3 03/12/2021 12:37 PM    Alk. phosphatase 141 (H) 03/12/2021 12:37 PM    Protein, total 7.8 03/12/2021 12:37 PM    Albumin 3.8 03/12/2021 12:37 PM    Globulin 4.0 03/12/2021 12:37 PM    A-G Ratio 1.0 03/12/2021 12:37 PM    ALT (SGPT) 28 03/12/2021 12:37 PM    AST (SGOT) 12 03/12/2021 12:37 PM     Lab Results   Component Value Date/Time    Cholesterol, total 209 (H) 03/12/2021 12:37 PM    HDL Cholesterol 46 03/12/2021 12:37 PM    LDL-C, External 168 09/03/2014 05:43 AM    LDL, calculated 100.8 (H) 03/12/2021 12:37 PM    VLDL, calculated 62.2 03/12/2021 12:37 PM    Triglyceride 311 (H) 03/12/2021 12:37 PM    CHOL/HDL Ratio 4.5 03/12/2021 12:37 PM     Lab Results   Component Value Date/Time    TSH 1.64 03/12/2021 12:34 PM     Lab Results   Component Value Date/Time    Hemoglobin A1c 6.6 (H) 03/12/2021 12:34 PM     Lab Results   Component Value Date/Time    Vitamin D 25-Hydroxy 11.1 (L) 03/12/2021 12:34 PM           ROS: See HPI    Physical Exam  Constitutional:       General: She is not in acute distress. Cardiovascular:      Rate and Rhythm: Normal rate and regular rhythm. Heart sounds: Normal heart sounds. Abdominal:      General: Bowel sounds are normal.      Palpations: Abdomen is soft. Tenderness: There is no abdominal tenderness. Musculoskeletal:         General: No swelling. Cervical back: Neck supple. Comments: No callus, no open skin area, peripheral pulsations of dorsalis pedis palpable bilaterally. Monofilament test normal.   Peripheral sensations of dorsalis pedis palpable bilaterally. Neurological:      Mental Status: She is oriented to person, place, and time. Psychiatric:         Behavior: Behavior normal.         ASSESSMENT and PLAN    ICD-10-CM ICD-9-CM    1.  Diabetes mellitus, new onset [facility]): Checking labs. Discussed importance of follow the recommendation and healthy diet repeating labs-not able to give her a refill. Patient understood it well. Discussed diet and lifestyle modification which she will try to work E11.9 250.00 LIPID PANEL      CBC W/O DIFF      METABOLIC PANEL, COMPREHENSIVE      HEMOGLOBIN A1C WITH EAG      MICROALBUMIN, UR, RAND W/ MICROALB/CREAT RATIO      TSH 3RD GENERATION       DIABETES FOOT EXAM   2. Essential hypertension:well controlled. Continue current dose of medication and low salt diet. Exercise as tolerated. I10 401.9    3. Gastroesophageal reflux disease without esophagitis: Fairly stable. No concern K21.9 530.81    4. Irritable bowel syndrome, unspecified type: Probiotic as needed K58.9 564.1    5. Thyroid condition/ not on any medication : Checking thyroid function. Asymptomatic E07.9 246.9    6. Anxiety: At baseline with current medication. She does not want any counseling or further evaluation F41.9 300.00    7. Screening for colon cancer  Z12.11 V76.51 REFERRAL TO GASTROENTEROLOGY   8. Encounter for screening mammogram for malignant neoplasm of breast  Z12.31 V76.12 [facility] MAMMO BI SCREENING INCL CAD   9. Sleep trouble: Discussed the sleep hygiene. She will work on it. G47.9 780.50    10. Need for hepatitis C screening test  Z11.59 V73.89 HEPATITIS C AB   Patient understood agreed with the plan  Advised to complete the mammogram and fit test.  Done counseling regarding compliance with instructions and follow-ups. She is up-to-date with an eye exam.  Will obtain the records  She has taken COVID booster  Follow-up and Dispositions    · Return in about 4 months (around 10/21/2022). Please note that this dictation was completed with [employer], the [employer] voice recognition software. Quite often unanticipated grammatical, syntax, homophones, and other interpretive errors are inadvertently transcribed by the computer software.   Please disregard these errors. Please excuse any errors that have escaped final proofreading.

## 2022-06-21 NOTE — PATIENT INSTRUCTIONS
Learning About Low-Carbohydrate Foods  What foods are low in carbohydrate? The foods you eat contain nutrients, such as vitamins and minerals. Carbohydrate is a nutrient. Your body needs the right amount to stay healthy and work as it should. You can use the list below to help you make choices about which foods to eat. Some foods that are lower in carbohydrate include:  Dairy and dairy alternatives  · Cheese  · Cottage cheese  · Cream cheese  · Nut milk (unsweetened)  · Soy milk (unsweetened)  · Yogurt (Greek, plain)  Fruits  · Avocado  · [employer] and other protein foods  · Almonds  · Beef  · Chicken  · Cod  · Eggs  · Halibut  · Peanut butter and other nut butters  · Pistachios  · Pork  · Pumpkin seeds  · Tofu  · Trout  · [place]  · [place] ([facility])  · Walnuts  Vegetables  · Broccoli  · Carrots  · Cauliflower  · Green beans  · Mushrooms  · Peppers  · Salad greens  · Spinach  · Tomatoes  Work with your doctor to find out how much of this nutrient you need. Depending on your health, you may need more or less of it in your diet. Where can you learn more? Go to http://www.gray.com/  Enter C470 in the search box to learn more about \"Learning About Low-Carbohydrate Foods. \"  Current as of: September 8, 2021               Content Version: 13.2  © 7543-7206 [employer]. Care instructions adapted under license by [employer] (which disclaims liability or warranty for this information). If you have questions about a medical condition or this instruction, always ask your healthcare professional. [place] [address] any warranty or liability for your use of this information. A Healthy Lifestyle: Care Instructions  Your Care Instructions     A healthy lifestyle can help you feel good, stay at a healthy weight, and have plenty of energy for both work and play. A healthy lifestyle is something you can share with your whole family.   A healthy lifestyle also can lower your risk for serious health problems, such as high blood pressure, heart disease, and diabetes. You can follow a few steps listed below to improve your health and the health of your family. Follow-up care is a key part of your treatment and safety. Be sure to make and go to all appointments, and call your doctor if you are having problems. It's also a good idea to know your test results and keep a list of the medicines you take. How can you care for yourself at home? · Do not eat too much sugar, fat, or fast foods. You can still have dessert and treats now and then. The goal is moderation. · Start small to improve your eating habits. Pay attention to portion sizes, drink less juice and soda pop, and eat more fruits and vegetables. ? Eat a healthy amount of food. A 3-ounce serving of meat, for example, is about the size of a deck of cards. Fill the rest of your plate with vegetables and whole grains. ? Limit the amount of soda and sports drinks you have every day. Drink more water when you are thirsty. ? Eat plenty of fruits and vegetables every day. Have an apple or some carrot sticks as an afternoon snack instead of a candy bar. Try to have fruits and/or vegetables at every meal.  · Make exercise part of your daily routine. You may want to start with simple activities, such as walking, bicycling, or slow swimming. Try to be active 30 to 60 minutes every day. You do not need to do all 30 to 60 minutes all at once. For example, you can exercise 3 times a day for 10 or 20 minutes. Moderate exercise is safe for most people, but it is always a good idea to talk to your doctor before starting an exercise program.  · Keep moving. Kit Shila the lawn, work in the garden, or [employer]. Take the stairs instead of the elevator at work. · If you smoke, quit. People who smoke have an increased risk for heart attack, stroke, cancer, and other lung illnesses.  Quitting is hard, but there are ways to boost your chance of quitting tobacco for good. ? Use nicotine gum, patches, or lozenges. ? Ask your doctor about stop-smoking programs and medicines. ? Keep trying. In addition to reducing your risk of diseases in the future, you will notice some benefits soon after you stop using tobacco. If you have shortness of breath or asthma symptoms, they will likely get better within a few weeks after you quit. · Limit how much alcohol you drink. Moderate amounts of alcohol (up to 2 drinks a day for men, 1 drink a day for women) are okay. But drinking too much can lead to liver problems, high blood pressure, and other health problems. Family health  If you have a family, there are many things you can do together to improve your health. · Eat meals together as a family as often as possible. · Eat healthy foods. This includes fruits, vegetables, lean meats and dairy, and whole grains. · Include your family in your fitness plan. Most people think of activities such as jogging or tennis as the way to fitness, but there are many ways you and your family can be more active. Anything that makes you breathe hard and gets your heart pumping is exercise. Here are some tips:  ? Walk to do errands or to take your child to school or the bus.  ? Go for a family bike ride after dinner instead of watching TV. Where can you learn more? Go to http://www.gray.com/  Enter E346 in the search box to learn more about \"A Healthy Lifestyle: Care Instructions. \"  Current as of: June 16, 2021               Content Version: 13.2  © 2006-2022 [employer]. Care instructions adapted under license by [employer] (which disclaims liability or warranty for this information). If you have questions about a medical condition or this instruction, always ask your healthcare professional. [place] [address] any warranty or liability for your use of this information.

## 2022-06-29 ENCOUNTER — PATIENT MESSAGE (OUTPATIENT)
Dept: FAMILY MEDICINE CLINIC | Age: 57
End: 2022-06-29

## 2022-07-07 ENCOUNTER — TELEPHONE (OUTPATIENT)
Dept: MAMMOGRAPHY | Age: 57
End: 2022-07-07

## 2022-07-07 DIAGNOSIS — I25.10 ATHEROSCLEROSIS OF NATIVE CORONARY ARTERY OF NATIVE HEART WITHOUT ANGINA PECTORIS: ICD-10-CM

## 2022-07-07 DIAGNOSIS — E78.2 MIXED HYPERLIPIDEMIA: ICD-10-CM

## 2022-07-07 DIAGNOSIS — G89.29 OTHER CHRONIC PAIN: ICD-10-CM

## 2022-07-07 DIAGNOSIS — K58.8 OTHER IRRITABLE BOWEL SYNDROME: ICD-10-CM

## 2022-07-07 RX ORDER — INDOMETHACIN 50 MG/1
CAPSULE ORAL
Qty: 60 CAPSULE | Refills: 0 | Status: SHIPPED | OUTPATIENT
Start: 2022-07-07

## 2022-07-07 RX ORDER — BUSPIRONE HYDROCHLORIDE 5 MG/1
5 TABLET ORAL DAILY
Qty: 90 TABLET | Refills: 1 | Status: SHIPPED | OUTPATIENT
Start: 2022-07-07

## 2022-07-07 RX ORDER — DICYCLOMINE HYDROCHLORIDE 20 MG/1
20 TABLET ORAL EVERY 6 HOURS
Qty: 90 TABLET | Refills: 1 | Status: SHIPPED | OUTPATIENT
Start: 2022-07-07 | End: 2022-10-23

## 2022-07-07 RX ORDER — ATORVASTATIN CALCIUM 10 MG/1
10 TABLET, FILM COATED ORAL DAILY
Qty: 90 TABLET | Refills: 3 | Status: SHIPPED | OUTPATIENT
Start: 2022-07-07

## 2022-07-07 NOTE — TELEPHONE ENCOUNTER
This pharmacy faxed over request for the following prescriptions to be filled:    Medication requested :   Requested Prescriptions     Pending Prescriptions Disp Refills    indomethacin (INDOCIN) 50 mg capsule 60 Capsule 0     Sig: take 1 capsule by mouth twice a day if needed    busPIRone (BUSPAR) 5 mg tablet 90 Tablet 0     Sig: Take 1 Tablet by mouth daily.  dicyclomine (BENTYL) 20 mg tablet 30 Tablet 0     PCP: [address] [place] [address] Blvd or Print: [facility]   Mail order or Local pharmacy [address] [place] [address]     Scheduled appointment if not seen by current providers in office: lov 6/21/2022f/u 10/21/2022 pt states that she has to go out of town tomorrow and would like to know if these medications could be refilled today .

## 2022-07-07 NOTE — TELEPHONE ENCOUNTER
I called  Lon Rosenberg, to assist her  in scheduling a Mammogram . I left a message for this patient to return my call  at [phone].     Jorgito Ferro LPN   Panel Manager

## 2022-08-18 DIAGNOSIS — I10 ESSENTIAL HYPERTENSION: ICD-10-CM

## 2022-08-19 RX ORDER — METOPROLOL SUCCINATE 25 MG/1
TABLET, EXTENDED RELEASE ORAL
Qty: 90 TABLET | Refills: 1 | Status: SHIPPED | OUTPATIENT
Start: 2022-08-19

## 2022-08-19 RX ORDER — NIFEDIPINE 30 MG/1
TABLET, EXTENDED RELEASE ORAL
Qty: 90 TABLET | Refills: 1 | Status: SHIPPED | OUTPATIENT
Start: 2022-08-19

## 2022-10-23 DIAGNOSIS — K58.8 OTHER IRRITABLE BOWEL SYNDROME: ICD-10-CM

## 2022-10-23 RX ORDER — DICYCLOMINE HYDROCHLORIDE 20 MG/1
TABLET ORAL
Qty: 90 TABLET | Refills: 1 | Status: SHIPPED | OUTPATIENT
Start: 2022-10-23

## 2022-11-08 ENCOUNTER — HOSPITAL ENCOUNTER (OUTPATIENT)
Dept: MAMMOGRAPHY | Age: 57
Discharge: HOME | End: 2022-11-08
Attending: FAMILY MEDICINE
Payer: COMMERCIAL

## 2022-11-08 DIAGNOSIS — Z12.31 ENCOUNTER FOR SCREENING MAMMOGRAM FOR MALIGNANT NEOPLASM OF BREAST: ICD-10-CM

## 2022-11-08 PROCEDURE — 77063 BREAST TOMOSYNTHESIS BI: CPT

## 2022-11-29 ENCOUNTER — VIRTUAL VISIT (OUTPATIENT)
Dept: FAMILY MEDICINE CLINIC | Age: 57
End: 2022-11-29

## 2022-11-29 ENCOUNTER — APPOINTMENT (OUTPATIENT)
Dept: GENERAL RADIOLOGY | Age: 57
End: 2022-11-29
Attending: PHYSICIAN ASSISTANT
Payer: COMMERCIAL

## 2022-11-29 ENCOUNTER — HOSPITAL ENCOUNTER (EMERGENCY)
Age: 57
Discharge: HOME | End: 2022-11-29
Attending: EMERGENCY MEDICINE
Payer: COMMERCIAL

## 2022-11-29 VITALS
HEIGHT: 66 IN | WEIGHT: 235 LBS | SYSTOLIC BLOOD PRESSURE: 127 MMHG | HEART RATE: 96 BPM | RESPIRATION RATE: 16 BRPM | OXYGEN SATURATION: 99 % | BODY MASS INDEX: 37.77 KG/M2 | TEMPERATURE: 100 F | DIASTOLIC BLOOD PRESSURE: 87 MMHG

## 2022-11-29 DIAGNOSIS — R05.9 COUGH, UNSPECIFIED TYPE: Primary | ICD-10-CM

## 2022-11-29 DIAGNOSIS — R50.9 FEVER, UNSPECIFIED FEVER CAUSE: ICD-10-CM

## 2022-11-29 DIAGNOSIS — B34.9 VIRAL ILLNESS: Primary | ICD-10-CM

## 2022-11-29 LAB
COVID-19 RAPID TEST, COVR: NOT DETECTED
FLUAV AG NPH QL IA: NEGATIVE
FLUBV AG NOSE QL IA: NEGATIVE
SOURCE, COVRS: NORMAL

## 2022-11-29 PROCEDURE — 99283 EMERGENCY DEPT VISIT LOW MDM: CPT

## 2022-11-29 PROCEDURE — 71046 X-RAY EXAM CHEST 2 VIEWS: CPT

## 2022-11-29 PROCEDURE — 87804 INFLUENZA ASSAY W/OPTIC: CPT

## 2022-11-29 PROCEDURE — 87635 SARS-COV-2 COVID-19 AMP PRB: CPT

## 2022-11-29 RX ORDER — PREDNISONE 50 MG/1
50 TABLET ORAL DAILY
Qty: 3 TABLET | Refills: 0 | Status: SHIPPED | OUTPATIENT
Start: 2022-11-29 | End: 2022-12-02

## 2022-11-29 RX ORDER — AZITHROMYCIN 250 MG/1
TABLET, FILM COATED ORAL
Qty: 6 TABLET | Refills: 0 | Status: SHIPPED | OUTPATIENT
Start: 2022-11-29 | End: 2022-12-04

## 2022-11-29 RX ORDER — BENZONATATE 100 MG/1
100 CAPSULE ORAL
Qty: 30 CAPSULE | Refills: 0 | Status: SHIPPED | OUTPATIENT
Start: 2022-11-29 | End: 2022-12-06

## 2022-11-29 RX ORDER — ALBUTEROL SULFATE 90 UG/1
2 AEROSOL, METERED RESPIRATORY (INHALATION)
Qty: 18 G | Refills: 0 | Status: SHIPPED | OUTPATIENT
Start: 2022-11-29

## 2022-11-29 RX ORDER — ACETAMINOPHEN 325 MG/1
650 TABLET ORAL
Qty: 20 TABLET | Refills: 0 | Status: SHIPPED | OUTPATIENT
Start: 2022-11-29

## 2022-11-29 RX ORDER — IBUPROFEN 600 MG/1
600 TABLET ORAL
Qty: 20 TABLET | Refills: 0 | Status: SHIPPED | OUTPATIENT
Start: 2022-11-29

## 2022-11-29 RX ORDER — HYDROCODONE POLISTIREX AND CHLORPHENIRAMINE POLISTIREX 10; 8 MG/5ML; MG/5ML
5 SUSPENSION, EXTENDED RELEASE ORAL
Qty: 70 ML | Refills: 0 | Status: SHIPPED | OUTPATIENT
Start: 2022-11-29 | End: 2022-12-02

## 2022-11-29 NOTE — PROGRESS NOTES
Cherry Suresh is a 56 y.o. female who was seen by synchronous (real-time) audio-video technology on 11/29/2022 for Hypertension, GERD, Thyroid Problem, Diabetes, Anxiety, Back Pain, and Eye Problem        Assessment & Plan:   Diagnoses and all orders for this visit:    1. Cough, unspecified type: See HPI. Sent patient to urgent care    2. Fever, unspecified fever cause    Evaluation at the urgent care  Follow-up and Dispositions    Return if symptoms worsen or fail to improve. Follow-up according to urgent care recommendation    712  Subjective:     Done visit through [address] Patient has cough mainly dry. Sounds like a whooping cough. No audible wheezing. She was having a little trouble talking in full sentences due to constant cough. Started 3 days ago while she took her mother-in-law to the hospital.  Having a fever around 100.1. No shortness of breath or chest pain. No headache or dizziness. Having sneezing. Postnasal drip. Not done any home COVID test.  I have advised her to go to urgent care to have a further evaluation as she might need COVID test, influenza and RSV testing. She has not taken any medication so far. Advised to get evaluated at the urgent care  Prior to Admission medications    Medication Sig Start Date End Date Taking? Authorizing Provider   dicyclomine (BENTYL) 20 mg tablet take 1 tablet every 6 hours 10/23/22  Yes Joelle Kwon MD   sertraline (ZOLOFT) 25 mg tablet take 1 tablet by mouth once daily 9/12/22  Yes Joelle Kwon MD   NIFEdipine ER (PROCARDIA XL) 30 mg ER tablet take 1 tablet by mouth once daily 8/19/22  Yes Laura Iglesias NP   metoprolol succinate (TOPROL-XL) 25 mg XL tablet take 1 tablet by mouth once daily 8/19/22  Yes Irvin Kc NP   indomethacin (INDOCIN) 50 mg capsule take 1 capsule by mouth twice a day if needed 7/7/22  Yes Joelle Kwon MD   busPIRone (BUSPAR) 5 mg tablet Take 1 Tablet by mouth daily.  7/7/22  Yes Joelle Kwon MD atorvastatin (LIPITOR) 10 mg tablet Take 1 Tablet by mouth daily. 7/7/22  Yes Irvin Kc NP   lisinopriL (PRINIVIL, ZESTRIL) 20 mg tablet Take 1 Tablet by mouth daily. 6/13/22  Yes Silvio Naranjo MD   omeprazole (PRILOSEC) 20 mg capsule Take 1 Capsule by mouth daily. 6/13/22  Yes Silvio Naranjo MD   Cetirizine (ZYRTEC) 10 mg cap Take 10 mg by mouth daily. 5/20/19  Yes Silvio Naranjo MD         ROS    Objective:     Patient-Reported Vitals 11/29/2022   Patient-Reported Weight -   Patient-Reported Height -   Patient-Reported Pulse -   Patient-Reported Temperature 101   Patient-Reported Systolic  -   Patient-Reported Diastolic -      General: alert, cooperative, no distress   Mental  status: normal mood, behavior, speech, dress, motor activity, and thought processes, able to follow commands   HENT: NCAT   Neck: no visualized mass   Resp: no respiratory distress   Neuro: no gross deficits   Skin: no discoloration or lesions of concern on visible areas   Psychiatric: normal affect, consistent with stated mood, no evidence of hallucinations     Additional exam findings: We discussed the expected course, resolution and complications of the diagnosis(es) in detail. Medication risks, benefits, costs, interactions, and alternatives were discussed as indicated. I advised her to contact the office if her condition worsens, changes or fails to improve as anticipated. She expressed understanding with the diagnosis(es) and plan. Rita Hicks, was evaluated through a synchronous (real-time) audio-video encounter. The patient (or guardian if applicable) is aware that this is a billable service, which includes applicable co-pays. This Virtual Visit was conducted with patient's (and/or legal guardian's) consent.  The visit was conducted pursuant to the emergency declaration under the [facility], 1135 waiver authority and the [employer] Supplemental Appropriations Act. Patient identification was verified, and a caregiver was present when appropriate. The patient was located at: Home: [address] [place] [id number]  The provider was located at:  Facility (Appt Department): [address] [place] [address],Fourth Floor [address] [phone]        Tan Palafox MD

## 2022-11-29 NOTE — PROGRESS NOTES
Chief Complaint   Patient presents with    Hypertension    GERD    Thyroid Problem    Diabetes    Anxiety    Back Pain    Eye Problem      Visit Vitals  LMP 01/28/2015    Temp per patient 101    3 most recent PHQ Screens 11/29/2022   Little interest or pleasure in doing things Not at all   Feeling down, depressed, irritable, or hopeless Not at all   Total Score PHQ 2 0   Trouble falling or staying asleep, or sleeping too much Nearly every day   Feeling tired or having little energy Several days   Poor appetite, weight loss, or overeating Not at all   Feeling bad about yourself - or that you are a failure or have let yourself or your family down Not at all   Trouble concentrating on things such as school, work, reading, or watching TV Not at all   Moving or speaking so slowly that other people could have noticed; or the opposite being so fidgety that others notice Not at all   How difficult have these problems made it for you to do your work, take care of your home and get along with others Not difficult at all      Abuse Screening Questionnaire 11/29/2022   Do you ever feel afraid of your partner? N   Are you in a relationship with someone who physically or mentally threatens you? N   Is it safe for you to go home? Y      Fall Risk Assessment, last 12 mths 11/29/2022   Able to walk? Yes   Fall in past 12 months? 0   Do you feel unsteady? 0   Are you worried about falling 0     1. \"Have you been to the ER, urgent care clinic since your last visit? Hospitalized since your last visit? \" No    2. \"Have you seen or consulted any other health care providers outside of the [address] [place] [address] since your last visit? \" No     3. For patients aged 39-70: Has the patient had a colonoscopy / FIT/ Cologuard? Yes - Care Gap present. Rooming MA/LPN to request most recent results      If the patient is female:    4. For patients aged 41-77: Has the patient had a mammogram within the past 2 years?  Yes - no Care Gap present      5. For patients aged 21-65: Has the patient had a pap smear? Yes - Care Gap present.  Rooming MA/LPN to request most recent results

## 2022-11-29 NOTE — Clinical Note
David Cuello was seen and treated in our emergency department on 11/29/2022.     Patient may return to work once asymptomatic for 24 hours without any medication assistance    Bessie Cagle [place]

## 2022-11-29 NOTE — ED NOTES
Grace Mckinnon is a 56 y.o. female that was discharged in stable condition. The patients diagnosis, condition and treatment were explained to  patient and aftercare instructions were given. The patient verbalized understanding. Patient armband removed and shredded.

## 2022-11-29 NOTE — Clinical Note
Wandawagnerwilliam Sims was seen and treated in our emergency department on 11/29/2022.     Patient may return to work once asymptomatic for 24 hours without any medication assistance    Adiel Cagle [place]

## 2022-11-29 NOTE — ED PROVIDER NOTES
EMERGENCY DEPARTMENT HISTORY AND PHYSICAL EXAM    Date: 11/29/2022  Patient Name: Brad Gutierrez    History of Presenting Illness     Chief Complaint   Patient presents with    Cough    Fever    Flu Like Symptoms         History Provided By: Patient    Chief Complaint: cough, sneezing, body aches, fever, recent sick contact   Duration: 4 days  Timing: Gradual  Location: Chest  Quality: Coughing  Severity: Moderate  Modifying Factors: Coughing so much her back hurts  Associated Symptoms: none       Additional History (Context): Brad Gutierrez is a 56 y.o. female with a history of diabetes and hypertension who presents today for issues listed above. Patient reports her daughter was home for the holidays who is also ill and now she has similar symptoms. Did not get the flu shot but is up-to-date on her COVID shot. Patient also reports she has been going through a lot recently with the loss of her mother-in-law yesterday. Reports he had a virtual appointment prior to coming to the emergency department was advised come the emergency room for viral screening. PCP: Ksenia Bill MD    Current Outpatient Medications   Medication Sig Dispense Refill    predniSONE (DELTASONE) 50 mg tablet Take 1 Tablet by mouth daily for 3 days. 3 Tablet 0    ibuprofen (MOTRIN) 600 mg tablet Take 1 Tablet by mouth every six (6) hours as needed for Pain. 20 Tablet 0    acetaminophen (TYLENOL) 325 mg tablet Take 2 Tablets by mouth every four (4) hours as needed for Pain. 20 Tablet 0    benzonatate (Tessalon Perles) 100 mg capsule Take 1 Capsule by mouth three (3) times daily as needed for Cough for up to 7 days. 30 Capsule 0    albuterol (ProAir HFA) 90 mcg/actuation inhaler Take 2 Puffs by inhalation every four (4) hours as needed for Wheezing.  18 g 0    azithromycin (Zithromax Z-Willy) 250 mg tablet Take 2 tablets on day 1 and then 1 tablet on days 2 through 5 6 Tablet 0    HYDROcodone-chlorpheniramine (TUSSIONEX) 10-8 mg/5 mL suspension Take 5 mL by mouth every twelve (12) hours as needed for Cough for up to 3 days. Max Daily Amount: 10 mL. 70 mL 0    dicyclomine (BENTYL) 20 mg tablet take 1 tablet every 6 hours 90 Tablet 1    sertraline (ZOLOFT) 25 mg tablet take 1 tablet by mouth once daily 90 Tablet 1    NIFEdipine ER (PROCARDIA XL) 30 mg ER tablet take 1 tablet by mouth once daily 90 Tablet 1    metoprolol succinate (TOPROL-XL) 25 mg XL tablet take 1 tablet by mouth once daily 90 Tablet 1    indomethacin (INDOCIN) 50 mg capsule take 1 capsule by mouth twice a day if needed 60 Capsule 0    busPIRone (BUSPAR) 5 mg tablet Take 1 Tablet by mouth daily. 90 Tablet 1    atorvastatin (LIPITOR) 10 mg tablet Take 1 Tablet by mouth daily. 90 Tablet 3    lisinopriL (PRINIVIL, ZESTRIL) 20 mg tablet Take 1 Tablet by mouth daily. 90 Tablet 0    omeprazole (PRILOSEC) 20 mg capsule Take 1 Capsule by mouth daily. 90 Capsule 0    Cetirizine (ZYRTEC) 10 mg cap Take 10 mg by mouth daily.  30 Cap 2       Past History     Past Medical History:  Past Medical History:   Diagnosis Date    COVID-19     Diabetes ([facility] [address]     Pre diabetes    Essential hypertension     GERD (gastroesophageal reflux disease)     Glaucoma     Hyperlipidemia     Hypertension     Nausea & vomiting     nausea       Past Surgical History:  Past Surgical History:   Procedure Laterality Date    HX CHOLECYSTECTOMY      HX CHOLECYSTECTOMY  2003    HX DILATION AND CURETTAGE  8/14    hysterectomy    HX HEENT      glaucoma    HX ORTHOPAEDIC      back    HX PARTIAL HYSTERECTOMY         Family History:  Family History   Problem Relation Age of Onset    Heart Attack Mother     Coronary Art Dis Mother     Heart Attack Father     Coronary Art Dis Brother     Cancer Maternal Grandmother     Heart Attack Paternal Grandfather        Social History:  Social History     Tobacco Use    Smoking status: Never    Smokeless tobacco: Never   Vaping Use    Vaping Use: Never used   Substance Use Topics    Alcohol use: Not Currently    Drug use: Never       Allergies: Allergies   Allergen Reactions    Codeine Other (comments)    Doxycycline Other (comments)    Morphine Other (comments)     Chest burning    Morphine Other (comments)     \"chest burning\"    Albuterol Nausea and Vomiting         Review of Systems   Review of Systems   Constitutional:  Negative for chills and fever. HENT:  Positive for congestion. Negative for rhinorrhea, sore throat, tinnitus, trouble swallowing and voice change. Respiratory:  Positive for cough. Negative for shortness of breath. Cardiovascular:  Negative for chest pain. Gastrointestinal:  Negative for abdominal pain, blood in stool, constipation, diarrhea, nausea and vomiting. Genitourinary:  Negative for dysuria, frequency and hematuria. Musculoskeletal:  Negative for back pain and myalgias. Skin:  Negative for rash and wound. Neurological:  Negative for dizziness and headaches. All other systems reviewed and are negative. All Other Systems Negative  Physical Exam     Vitals:    11/29/22 1721   BP: 127/87   Pulse: 96   Resp: 16   Temp: 100 °F (37.8 °C)   SpO2: 99%   Weight: 106.6 kg (235 lb)   Height: 5' 6\" (1.676 m)     Physical Exam  Vitals and nursing note reviewed. Constitutional:       General: She is not in acute distress. Appearance: She is well-developed. She is not diaphoretic. HENT:      Head: Normocephalic and atraumatic. Eyes:      Conjunctiva/sclera: Conjunctivae normal.   Cardiovascular:      Rate and Rhythm: Normal rate and regular rhythm. Heart sounds: Normal heart sounds. Pulmonary:      Effort: Pulmonary effort is normal. No respiratory distress. Breath sounds: Normal breath sounds. Chest:      Chest wall: No tenderness. Abdominal:      General: Bowel sounds are normal. There is no distension. Palpations: Abdomen is soft. Tenderness: There is no abdominal tenderness. There is no guarding or rebound.    Musculoskeletal: General: No deformity. Cervical back: Normal range of motion and neck supple. Comments: Lung sounds are clear and equal bilaterally, no respiratory distress   Skin:     General: Skin is warm and dry. Neurological:      Mental Status: She is alert and oriented to person, place, and time. Deep Tendon Reflexes: Reflexes are normal and symmetric. Diagnostic Study Results     Labs -     Recent Results (from the past 12 hour(s))   INFLUENZA A & B AG (RAPID TEST)    Collection Time: 11/29/22  5:20 PM   Result Value Ref Range    Influenza A Antigen Negative NEG      Influenza B Antigen Negative NEG         Radiologic Studies -   XR CHEST PA LAT    (Results Pending)     CT Results  (Last 48 hours)      None          CXR Results  (Last 48 hours)      None              Medical Decision Making   I am the first provider for this patient. I reviewed the vital signs, available nursing notes, past medical history, past surgical history, family history and social history. Vital Signs-Reviewed the patient's vital signs. Records Reviewed: Nursing Notes and Old Medical Records     Procedures: None   Procedures    Provider Notes (Medical Decision Making):     Differential Diagnosis:  influenza, mononucleosis, acute bronchitis, URI, streptococcal pharyngitis, pneumonia, asthma exacerbation, allergic rhinitis, seasonal allergies, COVID    Plan: We will screen for COVID, influenza and pneumonia. Order chest x-ray. 6:57 PM  Will discharge home with multiple medications to include Tylenol, Motrin, antitussives, ProAir and short course of steroids. Patient requesting antibiotics however have discussed with her I do not feel these will be helpful. She states she is going to wait it out a little longer before filling them. Have encouraged close primary care follow-up. Will discharge home. MED RECONCILIATION:  No current facility-administered medications for this encounter.      Current Outpatient Medications   Medication Sig    predniSONE (DELTASONE) 50 mg tablet Take 1 Tablet by mouth daily for 3 days. ibuprofen (MOTRIN) 600 mg tablet Take 1 Tablet by mouth every six (6) hours as needed for Pain. acetaminophen (TYLENOL) 325 mg tablet Take 2 Tablets by mouth every four (4) hours as needed for Pain. benzonatate (Tessalon Perles) 100 mg capsule Take 1 Capsule by mouth three (3) times daily as needed for Cough for up to 7 days. albuterol (ProAir HFA) 90 mcg/actuation inhaler Take 2 Puffs by inhalation every four (4) hours as needed for Wheezing. azithromycin (Zithromax Z-Willy) 250 mg tablet Take 2 tablets on day 1 and then 1 tablet on days 2 through 5    HYDROcodone-chlorpheniramine (TUSSIONEX) 10-8 mg/5 mL suspension Take 5 mL by mouth every twelve (12) hours as needed for Cough for up to 3 days. Max Daily Amount: 10 mL. dicyclomine (BENTYL) 20 mg tablet take 1 tablet every 6 hours    sertraline (ZOLOFT) 25 mg tablet take 1 tablet by mouth once daily    NIFEdipine ER (PROCARDIA XL) 30 mg ER tablet take 1 tablet by mouth once daily    metoprolol succinate (TOPROL-XL) 25 mg XL tablet take 1 tablet by mouth once daily    indomethacin (INDOCIN) 50 mg capsule take 1 capsule by mouth twice a day if needed    busPIRone (BUSPAR) 5 mg tablet Take 1 Tablet by mouth daily. atorvastatin (LIPITOR) 10 mg tablet Take 1 Tablet by mouth daily. lisinopriL (PRINIVIL, ZESTRIL) 20 mg tablet Take 1 Tablet by mouth daily. omeprazole (PRILOSEC) 20 mg capsule Take 1 Capsule by mouth daily. Cetirizine (ZYRTEC) 10 mg cap Take 10 mg by mouth daily. Disposition:  Home     DISCHARGE NOTE:   Pt has been reexamined. Patient has no new complaints, changes, or physical findings. Care plan outlined and precautions discussed. Results of workup were reviewed with the patient. All medications were reviewed with the patient. All of pt's questions and concerns were addressed.  Patient was instructed and agrees to follow up with PCP as well as to return to the ED upon further deterioration. Patient is ready to go home. Follow-up Information       Follow up With Specialties Details Why Contact Info    [address] [facility] EMERGENCY DEPT Emergency Medicine  As needed [address]     Tera Kaye MD Family Medicine Schedule an appointment as soon as possible for a visit   [address]  [phone]              Current Discharge Medication List        START taking these medications    Details   predniSONE (DELTASONE) 50 mg tablet Take 1 Tablet by mouth daily for 3 days. Qty: 3 Tablet, Refills: 0  Start date: 11/29/2022, End date: 12/2/2022      ibuprofen (MOTRIN) 600 mg tablet Take 1 Tablet by mouth every six (6) hours as needed for Pain. Qty: 20 Tablet, Refills: 0  Start date: 11/29/2022      acetaminophen (TYLENOL) 325 mg tablet Take 2 Tablets by mouth every four (4) hours as needed for Pain. Qty: 20 Tablet, Refills: 0  Start date: 11/29/2022      benzonatate (Tessalon Perles) 100 mg capsule Take 1 Capsule by mouth three (3) times daily as needed for Cough for up to 7 days. Qty: 30 Capsule, Refills: 0  Start date: 11/29/2022, End date: 12/6/2022      albuterol (ProAir HFA) 90 mcg/actuation inhaler Take 2 Puffs by inhalation every four (4) hours as needed for Wheezing. Qty: 18 g, Refills: 0  Start date: 11/29/2022      azithromycin (Zithromax Z-Willy) 250 mg tablet Take 2 tablets on day 1 and then 1 tablet on days 2 through 5  Qty: 6 Tablet, Refills: 0  Start date: 11/29/2022, End date: 12/4/2022      HYDROcodone-chlorpheniramine (TUSSIONEX) 10-8 mg/5 mL suspension Take 5 mL by mouth every twelve (12) hours as needed for Cough for up to 3 days. Max Daily Amount: 10 mL. Qty: 70 mL, Refills: 0  Start date: 11/29/2022, End date: 12/2/2022    Associated Diagnoses: Viral illness                 Diagnosis     Clinical Impression:   1. Viral illness          \"Please note that this dictation was completed with The [employer], the computer voice recognition software. Quite often unanticipated grammatical, syntax, homophones, and other interpretive errors are inadvertently transcribed by the computer software. Please disregard these errors. Please excuse any errors that have escaped final proofreading. \"

## 2022-12-19 RX ORDER — BUSPIRONE HYDROCHLORIDE 5 MG/1
TABLET ORAL
Qty: 90 TABLET | Refills: 1 | Status: SHIPPED | OUTPATIENT
Start: 2022-12-19

## 2022-12-23 DIAGNOSIS — K58.8 OTHER IRRITABLE BOWEL SYNDROME: ICD-10-CM

## 2022-12-23 RX ORDER — DICYCLOMINE HYDROCHLORIDE 20 MG/1
TABLET ORAL
Qty: 90 TABLET | Refills: 1 | Status: SHIPPED | OUTPATIENT
Start: 2022-12-23

## 2023-02-13 RX ORDER — METOPROLOL SUCCINATE 25 MG/1
TABLET, EXTENDED RELEASE ORAL
Qty: 90 TABLET | Refills: 1 | Status: SHIPPED | OUTPATIENT
Start: 2023-02-13

## 2023-02-13 RX ORDER — NIFEDIPINE 30 MG/1
TABLET, EXTENDED RELEASE ORAL
Qty: 90 TABLET | Refills: 0 | Status: SHIPPED | OUTPATIENT
Start: 2023-02-13

## 2023-02-27 RX ORDER — SERTRALINE HYDROCHLORIDE 25 MG/1
TABLET, FILM COATED ORAL
Qty: 30 TABLET | Refills: 0 | Status: SHIPPED | OUTPATIENT
Start: 2023-02-27 | End: 2023-04-11

## 2023-03-14 RX ORDER — OMEPRAZOLE 20 MG/1
CAPSULE, DELAYED RELEASE ORAL
Qty: 30 CAPSULE | Refills: 0 | Status: SHIPPED | OUTPATIENT
Start: 2023-03-14 | End: 2023-04-11

## 2023-04-11 RX ORDER — SERTRALINE HYDROCHLORIDE 25 MG/1
TABLET, FILM COATED ORAL
Qty: 30 TABLET | Refills: 2 | Status: SHIPPED | OUTPATIENT
Start: 2023-04-11

## 2023-04-11 RX ORDER — OMEPRAZOLE 20 MG/1
CAPSULE, DELAYED RELEASE ORAL
Qty: 30 CAPSULE | Refills: 2 | Status: SHIPPED | OUTPATIENT
Start: 2023-04-11

## 2023-05-24 RX ORDER — LISINOPRIL 20 MG/1
TABLET ORAL
Qty: 90 TABLET | OUTPATIENT
Start: 2023-05-24

## 2023-05-24 RX ORDER — NIFEDIPINE 30 MG/1
30 TABLET, EXTENDED RELEASE ORAL DAILY
Qty: 30 TABLET | Refills: 0 | Status: SHIPPED | OUTPATIENT
Start: 2023-05-24

## 2023-05-24 RX ORDER — NIFEDIPINE 30 MG/1
30 TABLET, EXTENDED RELEASE ORAL DAILY
Qty: 90 TABLET | Refills: 0 | Status: SHIPPED | OUTPATIENT
Start: 2023-05-24 | End: 2023-05-24 | Stop reason: SDUPTHER

## 2023-05-24 RX ORDER — NIFEDIPINE 30 MG/1
TABLET, EXTENDED RELEASE ORAL
Qty: 90 TABLET | Refills: 0 | OUTPATIENT
Start: 2023-05-24

## 2023-06-06 RX ORDER — LISINOPRIL 20 MG/1
TABLET ORAL
Qty: 90 TABLET | OUTPATIENT
Start: 2023-06-06

## 2023-06-19 ENCOUNTER — TELEPHONE (OUTPATIENT)
Age: 58
End: 2023-06-19

## 2023-06-19 NOTE — TELEPHONE ENCOUNTER
----- Message from Jose Martin Rodríguez sent at 6/16/2023 12:09 PM EDT -----  Subject: Message to Provider    QUESTIONS  Information for Provider? Pt is awaiting lab results from 6/12 on MyChart.     ---------------------------------------------------------------------------  --------------  Hero Failing INFO  [phone]; OK to leave message on voicemail  ---------------------------------------------------------------------------  --------------  SCRIPT ANSWERS  Relationship to Patient?  Self

## 2023-06-19 NOTE — TELEPHONE ENCOUNTER
Overall, her labs appear to be fairly stable from previous. Her A1C was 6.8%. Our goal is <6.5%, and her previous was 6.6%. This is fairly stable. I recommend she be diligent with following a diabetic diet and can further discuss her options at her appt with Dr. Samson Nurse in September. Her cholesterol was elevated but better than the last time it was checked. I recommend she continue her Lipitor. She can discuss a possible dosage increase at her upcoming cardiology appointment. Her alkaline phosphatase remained a little elevated but was also stable from previous. I recommend she monitor this.

## 2023-06-22 ENCOUNTER — OFFICE VISIT (OUTPATIENT)
Age: 58
End: 2023-06-22
Payer: COMMERCIAL

## 2023-06-22 VITALS
HEART RATE: 81 BPM | SYSTOLIC BLOOD PRESSURE: 121 MMHG | BODY MASS INDEX: 35.68 KG/M2 | OXYGEN SATURATION: 95 % | HEIGHT: 66 IN | WEIGHT: 222 LBS | DIASTOLIC BLOOD PRESSURE: 71 MMHG

## 2023-06-22 DIAGNOSIS — I10 ESSENTIAL (PRIMARY) HYPERTENSION: ICD-10-CM

## 2023-06-22 DIAGNOSIS — I25.84 CORONARY ATHEROSCLEROSIS DUE TO CALCIFIED CORONARY LESION (CODE): Primary | ICD-10-CM

## 2023-06-22 DIAGNOSIS — Q24.5 ANOMALOUS CORONARY ARTERY ORIGIN: ICD-10-CM

## 2023-06-22 DIAGNOSIS — E66.01 SEVERE OBESITY (BMI 35.0-39.9) WITH COMORBIDITY (HCC): ICD-10-CM

## 2023-06-22 DIAGNOSIS — E78.2 MIXED HYPERLIPIDEMIA: ICD-10-CM

## 2023-06-22 PROCEDURE — 3078F DIAST BP <80 MM HG: CPT | Performed by: NURSE PRACTITIONER

## 2023-06-22 PROCEDURE — 93000 ELECTROCARDIOGRAM COMPLETE: CPT | Performed by: NURSE PRACTITIONER

## 2023-06-22 PROCEDURE — 99214 OFFICE O/P EST MOD 30 MIN: CPT | Performed by: NURSE PRACTITIONER

## 2023-06-22 PROCEDURE — 3074F SYST BP LT 130 MM HG: CPT | Performed by: NURSE PRACTITIONER

## 2023-06-22 NOTE — PROGRESS NOTES
1. Have you been to the ER, urgent care clinic since your last visit? Hospitalized since your last visit? Yes Where: [facility]    2. Have you seen or consulted any other health care providers outside of the [address] [place] [address] since your last visit? Include any pap smears or colon screening. No     3. Do you need any refills today?    no
Blood pressure demonstrated a normal response to exercise. Heart rate demonstrated maximal response to stress. Overall, the patient's exercise capacity was fair. Angina Index is 0. The Duke Treadmill score is 5 (low risk). Negative stress test.  [facility] WITH [address]  Other Result Information   This result has an attachment that is not available. Result Narrative   PATIENT: Franki Byrd  : 1965  --------------------  Impression  --------------------  1. 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 Consider correlation with ischemia testing. 2. No evidence of coronary atherosclerosis. Impression     Pulmonary Function Test    Flows:  Normal flows    Volumes:  FRC, RV and ERV are reduced    Flow Volume Loop:  Suggestive of mild restriction      Diffusion:  Normal Diffusion Capacity      Impression:  Mild restrictive ventilatory defect with normal spirometry and diffusion capacity. Comment:  See technicians comments. ASSESSMENT and PLAN    Ms. Irineo Gray has a reminder for a \"due or due soon\" health maintenance. I have asked that she contact her primary care provider for follow-up on this health maintenance. No flowsheet data found. Assessment       Ms. Irineo Gray has a reminder for a \"due or due soon\" health maintenance. I have asked that she contact her primary care provider for follow-up on this health maintenance. No flowsheet data found. Assessment        Diagnosis Orders   1. Coronary atherosclerosis due to calcified coronary lesion (CODE)  EKG 12 Lead    Stable symptoms monitor      2. Severe obesity (BMI 35.0-39. 9) with comorbidity ([address]      Weight loss efforts encouraged      3. Essential (primary) hypertension      Controlled continue current medications      4. Mixed hyperlipidemia      Continue treatment lab with PCP      5.

## 2023-06-26 RX ORDER — LISINOPRIL 20 MG/1
TABLET ORAL
Qty: 90 TABLET | Refills: 0 | OUTPATIENT
Start: 2023-06-26

## 2023-07-10 RX ORDER — SERTRALINE HYDROCHLORIDE 25 MG/1
TABLET, FILM COATED ORAL
Qty: 90 TABLET | Refills: 0 | Status: SHIPPED | OUTPATIENT
Start: 2023-07-10

## 2023-07-10 RX ORDER — OMEPRAZOLE 20 MG/1
CAPSULE, DELAYED RELEASE ORAL
Qty: 90 CAPSULE | Refills: 0 | Status: SHIPPED | OUTPATIENT
Start: 2023-07-10

## 2023-07-20 NOTE — PROGRESS NOTES
1. \"Have you been to the ER, urgent care clinic since your last visit? Hospitalized since your last visit? \" Yes When: Aggie Martinez 11/29/22 Viral infection. 2. \"Have you seen or consulted any other health care providers outside of the [address] [place] [address] Avenue since your last visit? \" Tong Hummel; 1/30/23. 3. For patients aged 43-73: Has the patient had a colonoscopy / FIT/ Cologuard? Colonoscopy in 2023 at [facility] - will obtain record. If the patient is female:    4. For patients aged 43-66: Has the patient had a mammogram within the past 2 years? Yes - no Care Gap present  11/08/22      5. For patients aged 21-65: Has the patient had a pap smear?  No - hysterectomy    Chief Complaint   Patient presents with    Results    Diabetes    Hypertension    Blood Sugar Problem    Thyroid Problem    Anxiety    Sleep Problem

## 2023-07-23 SDOH — ECONOMIC STABILITY: HOUSING INSECURITY
IN THE LAST 12 MONTHS, WAS THERE A TIME WHEN YOU DID NOT HAVE A STEADY PLACE TO SLEEP OR SLEPT IN A SHELTER (INCLUDING NOW)?: NO

## 2023-07-23 SDOH — ECONOMIC STABILITY: FOOD INSECURITY: WITHIN THE PAST 12 MONTHS, YOU WORRIED THAT YOUR FOOD WOULD RUN OUT BEFORE YOU GOT MONEY TO BUY MORE.: NEVER TRUE

## 2023-07-23 SDOH — ECONOMIC STABILITY: FOOD INSECURITY: WITHIN THE PAST 12 MONTHS, THE FOOD YOU BOUGHT JUST DIDN'T LAST AND YOU DIDN'T HAVE MONEY TO GET MORE.: PATIENT DECLINED

## 2023-07-23 SDOH — ECONOMIC STABILITY: TRANSPORTATION INSECURITY
IN THE PAST 12 MONTHS, HAS LACK OF TRANSPORTATION KEPT YOU FROM MEETINGS, WORK, OR FROM GETTING THINGS NEEDED FOR DAILY LIVING?: NO

## 2023-07-23 SDOH — ECONOMIC STABILITY: INCOME INSECURITY: HOW HARD IS IT FOR YOU TO PAY FOR THE VERY BASICS LIKE FOOD, HOUSING, MEDICAL CARE, AND HEATING?: SOMEWHAT HARD

## 2023-07-24 ENCOUNTER — OFFICE VISIT (OUTPATIENT)
Age: 58
End: 2023-07-24
Payer: COMMERCIAL

## 2023-07-24 VITALS
RESPIRATION RATE: 16 BRPM | TEMPERATURE: 97.2 F | HEART RATE: 83 BPM | SYSTOLIC BLOOD PRESSURE: 116 MMHG | DIASTOLIC BLOOD PRESSURE: 80 MMHG | WEIGHT: 224.2 LBS | OXYGEN SATURATION: 95 % | HEIGHT: 65 IN | BODY MASS INDEX: 37.36 KG/M2

## 2023-07-24 DIAGNOSIS — K58.9 IRRITABLE BOWEL SYNDROME, UNSPECIFIED TYPE: ICD-10-CM

## 2023-07-24 DIAGNOSIS — E78.1 HYPERTRIGLYCERIDEMIA: ICD-10-CM

## 2023-07-24 DIAGNOSIS — F41.9 ANXIETY: ICD-10-CM

## 2023-07-24 DIAGNOSIS — E11.9 TYPE 2 DIABETES MELLITUS WITHOUT COMPLICATION, WITHOUT LONG-TERM CURRENT USE OF INSULIN (HCC): Primary | ICD-10-CM

## 2023-07-24 DIAGNOSIS — K21.9 GASTROESOPHAGEAL REFLUX DISEASE WITHOUT ESOPHAGITIS: ICD-10-CM

## 2023-07-24 DIAGNOSIS — I10 ESSENTIAL HYPERTENSION: ICD-10-CM

## 2023-07-24 DIAGNOSIS — E07.9 THYROID CONDITION: ICD-10-CM

## 2023-07-24 PROCEDURE — 3079F DIAST BP 80-89 MM HG: CPT | Performed by: FAMILY MEDICINE

## 2023-07-24 PROCEDURE — 3044F HG A1C LEVEL LT 7.0%: CPT | Performed by: FAMILY MEDICINE

## 2023-07-24 PROCEDURE — 99214 OFFICE O/P EST MOD 30 MIN: CPT | Performed by: FAMILY MEDICINE

## 2023-07-24 PROCEDURE — 3074F SYST BP LT 130 MM HG: CPT | Performed by: FAMILY MEDICINE

## 2023-07-24 ASSESSMENT — ANXIETY QUESTIONNAIRES
3. WORRYING TOO MUCH ABOUT DIFFERENT THINGS: 1
GAD7 TOTAL SCORE: 7
6. BECOMING EASILY ANNOYED OR IRRITABLE: 1
5. BEING SO RESTLESS THAT IT IS HARD TO SIT STILL: 1
2. NOT BEING ABLE TO STOP OR CONTROL WORRYING: 1
7. FEELING AFRAID AS IF SOMETHING AWFUL MIGHT HAPPEN: 0
4. TROUBLE RELAXING: 1
1. FEELING NERVOUS, ANXIOUS, OR ON EDGE: 2
IF YOU CHECKED OFF ANY PROBLEMS ON THIS QUESTIONNAIRE, HOW DIFFICULT HAVE THESE PROBLEMS MADE IT FOR YOU TO DO YOUR WORK, TAKE CARE OF THINGS AT HOME, OR GET ALONG WITH OTHER PEOPLE: SOMEWHAT DIFFICULT

## 2023-07-24 ASSESSMENT — PATIENT HEALTH QUESTIONNAIRE - PHQ9
1. LITTLE INTEREST OR PLEASURE IN DOING THINGS: 0
SUM OF ALL RESPONSES TO PHQ9 QUESTIONS 1 & 2: 0
SUM OF ALL RESPONSES TO PHQ QUESTIONS 1-9: 0
2. FEELING DOWN, DEPRESSED OR HOPELESS: 0
SUM OF ALL RESPONSES TO PHQ QUESTIONS 1-9: 0

## 2023-07-24 NOTE — PROGRESS NOTES
Rachid Kay is a 66 y.o. female complains of   Chief Complaint   Patient presents with    Results    Diabetes    Hypertension    Blood Sugar Problem    Thyroid Problem    Anxiety    Sleep Problem       HPI: Here for routine follow-up  Diabetes. A1c went up to 6.8. She has metformin at home but has not started yet. Discussed importance of maltreated diabetes. Discussed diet modification exercise and weight loss. She will work on it. Asking for nutritionist referral as well which was done during the visit. No hypothyroidism symptoms. Not checking blood sugar at home. During visit sitting comfortable without any acute distress  History of hypertension, hyper lipidemia. Elevated triglyceride on recent labs. Again discussed diet modification and lifestyle modification with weight loss importance. Discussed metformin no side effects. She agrees to try it once a day. Also history of irritable bowel syndrome. Discussed probiotic. If symptoms persist GI upset or loose stool advised to contact office. Pressure taking medication with compliance and asymptomatic. Taking statin without any side effects. History of anxiety which has been fairly stable. Last: The mother-in-law, she was very close to. Discussed family counseling which she will think about. Currently denies any panic attacks. Sleep is fair. Discussed the hygiene. No thoughts of hurting herself or someone else. Denies any headache, dizziness, no chest pain or trouble breathing, no arm or leg weakness. No nausea or vomiting, no weight or appetite changes, no mood changes . No urine or bowel complains, no palpitation, no diaphoresis. No abdominal pain. No cold or cough. No leg swelling. No fever. No sleep trouble.    CMP:  Lab Results   Component Value Date/Time     06/12/2023 11:50 AM    K 3.9 06/12/2023 11:50 AM     06/12/2023 11:50 AM    CO2 28 06/12/2023 11:50 AM    BUN 16 06/12/2023 11:50 AM    CREATININE 0.89

## 2023-08-14 RX ORDER — METOPROLOL SUCCINATE 25 MG/1
TABLET, EXTENDED RELEASE ORAL
Qty: 90 TABLET | Refills: 3 | Status: SHIPPED | OUTPATIENT
Start: 2023-08-14

## 2023-09-14 RX ORDER — LISINOPRIL 20 MG/1
20 TABLET ORAL DAILY
Qty: 90 TABLET | Refills: 1 | Status: SHIPPED | OUTPATIENT
Start: 2023-09-14

## 2023-09-25 RX ORDER — NIFEDIPINE 30 MG/1
30 TABLET, EXTENDED RELEASE ORAL DAILY
Qty: 30 TABLET | Refills: 0 | Status: SHIPPED | OUTPATIENT
Start: 2023-09-25

## 2023-09-25 RX ORDER — BUSPIRONE HYDROCHLORIDE 5 MG/1
TABLET ORAL
Qty: 90 TABLET | Refills: 0 | Status: SHIPPED | OUTPATIENT
Start: 2023-09-25

## 2023-09-26 RX ORDER — BUSPIRONE HYDROCHLORIDE 5 MG/1
5 TABLET ORAL DAILY
Qty: 90 TABLET | Refills: 0 | OUTPATIENT
Start: 2023-09-26

## 2023-10-13 RX ORDER — SERTRALINE HYDROCHLORIDE 25 MG/1
25 TABLET, FILM COATED ORAL DAILY
Qty: 90 TABLET | Refills: 0 | OUTPATIENT
Start: 2023-10-13

## 2023-10-13 RX ORDER — OMEPRAZOLE 20 MG/1
CAPSULE, DELAYED RELEASE ORAL
Qty: 90 CAPSULE | Refills: 0 | Status: SHIPPED | OUTPATIENT
Start: 2023-10-13

## 2023-10-13 RX ORDER — SERTRALINE HYDROCHLORIDE 25 MG/1
TABLET, FILM COATED ORAL
Qty: 90 TABLET | Refills: 0 | Status: SHIPPED | OUTPATIENT
Start: 2023-10-13

## 2023-10-13 RX ORDER — OMEPRAZOLE 20 MG/1
20 CAPSULE, DELAYED RELEASE ORAL DAILY
Qty: 90 CAPSULE | Refills: 0 | OUTPATIENT
Start: 2023-10-13

## 2023-10-23 RX ORDER — NIFEDIPINE 30 MG/1
30 TABLET, EXTENDED RELEASE ORAL DAILY
Qty: 90 TABLET | Refills: 1 | Status: SHIPPED | OUTPATIENT
Start: 2023-10-23

## 2023-10-27 RX ORDER — INDOMETHACIN 50 MG/1
CAPSULE ORAL
Qty: 60 CAPSULE | Refills: 1 | Status: SHIPPED | OUTPATIENT
Start: 2023-10-27

## 2023-11-27 RX ORDER — ATORVASTATIN CALCIUM 10 MG/1
TABLET, FILM COATED ORAL
Qty: 90 TABLET | Refills: 1 | Status: SHIPPED | OUTPATIENT
Start: 2023-11-27

## 2024-01-15 RX ORDER — SERTRALINE HYDROCHLORIDE 25 MG/1
TABLET, FILM COATED ORAL
Qty: 90 TABLET | Refills: 0 | Status: SHIPPED | OUTPATIENT
Start: 2024-01-15

## 2024-01-17 NOTE — TELEPHONE ENCOUNTER
This patient contacted the office for the following prescriptions to be refilled:    Medication requested :   Requested Prescriptions     Pending Prescriptions Disp Refills    omeprazole (PRILOSEC) 20 MG delayed release capsule [Pharmacy Med Name: OMEPRAZOLE DR 20 MG CAPSULE] 90 capsule 0     Sig: take 1 capsule by mouth once daily      LAST REFILLED: 10/13/2023  PCP: Rona Kirby MD  LOV: 7/24/2023  NOV: 1/23/2024  FUTURE APPT:   Future Appointments   Date Time Provider Department Center   1/23/2024  2:15 PM Rona Kirby MD [facility]   4/9/2024  1:00 PM Adalberto Choi, APRN - NP [facility] AM[facility]     LABS: No results found for this or any previous visit (from the past 2160 hour(s)).      Thank you.

## 2024-01-18 RX ORDER — OMEPRAZOLE 20 MG/1
CAPSULE, DELAYED RELEASE ORAL
Qty: 90 CAPSULE | Refills: 0 | Status: SHIPPED | OUTPATIENT
Start: 2024-01-18

## 2024-01-23 ENCOUNTER — OFFICE VISIT (OUTPATIENT)
Age: 59
End: 2024-01-23
Payer: COMMERCIAL

## 2024-01-23 VITALS
HEART RATE: 81 BPM | HEIGHT: 65 IN | WEIGHT: 224.8 LBS | DIASTOLIC BLOOD PRESSURE: 78 MMHG | SYSTOLIC BLOOD PRESSURE: 118 MMHG | OXYGEN SATURATION: 95 % | RESPIRATION RATE: 16 BRPM | BODY MASS INDEX: 37.45 KG/M2 | TEMPERATURE: 97.5 F

## 2024-01-23 DIAGNOSIS — R73.03 BORDERLINE DIABETES: Primary | ICD-10-CM

## 2024-01-23 DIAGNOSIS — Z11.4 SCREENING FOR HIV (HUMAN IMMUNODEFICIENCY VIRUS): ICD-10-CM

## 2024-01-23 DIAGNOSIS — Z12.31 ENCOUNTER FOR SCREENING MAMMOGRAM FOR MALIGNANT NEOPLASM OF BREAST: ICD-10-CM

## 2024-01-23 DIAGNOSIS — Z12.83 SCREENING FOR SKIN CANCER: ICD-10-CM

## 2024-01-23 DIAGNOSIS — K92.1 BLOOD IN STOOL: ICD-10-CM

## 2024-01-23 DIAGNOSIS — R10.9 ABDOMINAL CRAMPS: ICD-10-CM

## 2024-01-23 DIAGNOSIS — J39.2 THROAT IRRITATION: ICD-10-CM

## 2024-01-23 LAB
EXP DATE SOLUTION: NORMAL
EXP DATE SWAB: NORMAL
EXPIRATION DATE: NORMAL
GROUP A STREP ANTIGEN, POC: NEGATIVE
LOT NUMBER POC: NORMAL
LOT NUMBER SOLUTION: NORMAL
LOT NUMBER SWAB: NORMAL
SARS-COV-2 RNA, POC: NEGATIVE
VALID INTERNAL CONTROL, POC: YES

## 2024-01-23 PROCEDURE — 87635 SARS-COV-2 COVID-19 AMP PRB: CPT | Performed by: FAMILY MEDICINE

## 2024-01-23 PROCEDURE — 3078F DIAST BP <80 MM HG: CPT | Performed by: FAMILY MEDICINE

## 2024-01-23 PROCEDURE — 87880 STREP A ASSAY W/OPTIC: CPT | Performed by: FAMILY MEDICINE

## 2024-01-23 PROCEDURE — 3074F SYST BP LT 130 MM HG: CPT | Performed by: FAMILY MEDICINE

## 2024-01-23 PROCEDURE — 99213 OFFICE O/P EST LOW 20 MIN: CPT | Performed by: FAMILY MEDICINE

## 2024-01-23 ASSESSMENT — ANXIETY QUESTIONNAIRES
5. BEING SO RESTLESS THAT IT IS HARD TO SIT STILL: 0
IF YOU CHECKED OFF ANY PROBLEMS ON THIS QUESTIONNAIRE, HOW DIFFICULT HAVE THESE PROBLEMS MADE IT FOR YOU TO DO YOUR WORK, TAKE CARE OF THINGS AT HOME, OR GET ALONG WITH OTHER PEOPLE: SOMEWHAT DIFFICULT
GAD7 TOTAL SCORE: 10
1. FEELING NERVOUS, ANXIOUS, OR ON EDGE: 2
6. BECOMING EASILY ANNOYED OR IRRITABLE: 2
7. FEELING AFRAID AS IF SOMETHING AWFUL MIGHT HAPPEN: 0
3. WORRYING TOO MUCH ABOUT DIFFERENT THINGS: 2
2. NOT BEING ABLE TO STOP OR CONTROL WORRYING: 2
4. TROUBLE RELAXING: 2

## 2024-01-23 ASSESSMENT — PATIENT HEALTH QUESTIONNAIRE - PHQ9
SUM OF ALL RESPONSES TO PHQ QUESTIONS 1-9: 0
SUM OF ALL RESPONSES TO PHQ QUESTIONS 1-9: 0
SUM OF ALL RESPONSES TO PHQ9 QUESTIONS 1 & 2: 0
SUM OF ALL RESPONSES TO PHQ QUESTIONS 1-9: 0
1. LITTLE INTEREST OR PLEASURE IN DOING THINGS: 0
SUM OF ALL RESPONSES TO PHQ QUESTIONS 1-9: 0
2. FEELING DOWN, DEPRESSED OR HOPELESS: 0

## 2024-01-23 NOTE — PATIENT INSTRUCTIONS
Please schedule an appt with gyn for pap smear as you are due   Please schedule your eye exam   Complete your blood work     Start probitic. Metamucil fiber powder in a glass of water. Take 1-2 tsp at bedtime.   Drink lots of water   Light diet.

## 2024-01-23 NOTE — PROGRESS NOTES
1. \"Have you been to the ER, urgent care clinic since your last visit?  Hospitalized since your last visit?\" No    2. \"Have you seen or consulted any other health care providers outside of the [facility] since your last visit?\" No     Chief Complaint   Patient presents with    Diabetes    Hypertension    Cholesterol Problem    Blood Sugar Problem    Abdominal Pain     X 4 days    Rectal Bleeding     X 4 days    scratchy throat

## 2024-01-23 NOTE — PROGRESS NOTES
Adwoa Plascencia is a 58 y.o. female complains of   Chief Complaint   Patient presents with    Diabetes    Hypertension    Cholesterol Problem    Blood Sugar Problem    Abdominal Pain     X 4 days    Rectal Bleeding     X 4 days    scratchy throat       HPI: Here for follow-up.  Also complaining of generalized abdominal cramps and discomfort.  Has to go to bathroom frequently.  Blood in the stool.  Mild straining.  No constipation.  Not ate anything outside.  Coworker had a COVID-positive test at work.  Exposure.  At the office COVID test negative.  Does have a scratchy throat on and off.  Advised salt water gargle.  Strep test negative as well.  Borderline diabetes.  Not taking metformin.  Will recheck labs.  No hypo or hyperglycemic symptoms.  Discussed could be stomach upset and frequency of bowel movement could be from high sugar.  Will check labs.  Advised probiotic.  Light diet.  And sending to colorectal specialist for blood in the stool.  Has up-to-date with colonoscopy.  No mention about hemorrhoid.  Discussed to avoid constipation.  High-fiber diet.  Vitals been stable.  Taking blood pressure medication with compliance and no side effects.  Taking statin and tolerating it well.  Anxiety.  On medication.  No side effects.  BuSpar helps.  Will observe.  Some time thoughts racing during sleep time.  Having trouble sleeping.  Discussed sleep hygiene.  Make a list of routine activity and plan to accomplish it.  She will work on it.  Did not appear in any acute distress  Denies any headache, dizziness, no chest pain or trouble breathing, no arm or leg weakness. No nausea or vomiting, no weight or appetite changes, no mood changes . No urine or bowel complains, no palpitation, no diaphoresis. No abdominal pain. No cold or cough. No leg swelling. No fever.  CMP:  Lab Results   Component Value Date/Time     06/12/2023 11:50 AM    K 3.9 06/12/2023 11:50 AM     06/12/2023 11:50 AM    CO2 28 06/12/2023

## 2024-03-01 RX ORDER — LISINOPRIL 20 MG/1
20 TABLET ORAL DAILY
Qty: 90 TABLET | Refills: 1 | Status: SHIPPED | OUTPATIENT
Start: 2024-03-01

## 2024-03-22 RX ORDER — DICYCLOMINE HCL 20 MG
20 TABLET ORAL EVERY 6 HOURS
Qty: 90 TABLET | Refills: 0 | Status: SHIPPED | OUTPATIENT
Start: 2024-03-22 | End: 2024-04-23 | Stop reason: SDUPTHER

## 2024-03-22 RX ORDER — BUSPIRONE HYDROCHLORIDE 5 MG/1
TABLET ORAL
Qty: 90 TABLET | Refills: 0 | Status: SHIPPED | OUTPATIENT
Start: 2024-03-22 | End: 2024-04-23 | Stop reason: SDUPTHER

## 2024-04-12 RX ORDER — NIFEDIPINE 30 MG/1
30 TABLET, EXTENDED RELEASE ORAL DAILY
Qty: 90 TABLET | Refills: 1 | OUTPATIENT
Start: 2024-04-12

## 2024-04-22 NOTE — PROGRESS NOTES
\"Have you been to the ER, urgent care clinic since your last visit?  Hospitalized since your last visit?\"    NO    “Have you seen or consulted any other health care providers outside of [facility] since your last visit?”    NO     “Have you had a pap smear?”    NO    No cervical cancer screening on file       Last dm eye exam - No, but sees Dr. Be. Patient stated she will schedule an appointment.    Last dm foot exam - None; does not see a podiatrist. Shoes and socks removed for exam.    Chief Complaint   Patient presents with    Diabetes    blood in stool    abnormal cramping    throat irritation    Fatigue    Cough     Hurts when breathing in and out         Click Here for Release of Records Request

## 2024-04-23 ENCOUNTER — OFFICE VISIT (OUTPATIENT)
Age: 59
End: 2024-04-23
Payer: COMMERCIAL

## 2024-04-23 VITALS
HEIGHT: 65 IN | HEART RATE: 77 BPM | SYSTOLIC BLOOD PRESSURE: 120 MMHG | WEIGHT: 227.4 LBS | BODY MASS INDEX: 37.89 KG/M2 | DIASTOLIC BLOOD PRESSURE: 72 MMHG | RESPIRATION RATE: 16 BRPM | OXYGEN SATURATION: 94 % | TEMPERATURE: 97.6 F

## 2024-04-23 DIAGNOSIS — R53.82 CHRONIC FATIGUE: ICD-10-CM

## 2024-04-23 DIAGNOSIS — Z12.4 SCREENING FOR CERVICAL CANCER: ICD-10-CM

## 2024-04-23 DIAGNOSIS — E11.9 TYPE 2 DIABETES MELLITUS WITHOUT COMPLICATION, WITHOUT LONG-TERM CURRENT USE OF INSULIN (HCC): ICD-10-CM

## 2024-04-23 DIAGNOSIS — E66.01 SEVERE OBESITY (BMI 35.0-39.9) WITH COMORBIDITY (HCC): ICD-10-CM

## 2024-04-23 DIAGNOSIS — R20.0 NUMBNESS AND TINGLING OF HAND: ICD-10-CM

## 2024-04-23 DIAGNOSIS — R20.2 NUMBNESS AND TINGLING OF HAND: ICD-10-CM

## 2024-04-23 DIAGNOSIS — R10.9 ABDOMINAL CRAMPS: ICD-10-CM

## 2024-04-23 DIAGNOSIS — J39.2 THROAT IRRITATION: Primary | ICD-10-CM

## 2024-04-23 DIAGNOSIS — K92.1 BLOOD IN STOOL: ICD-10-CM

## 2024-04-23 PROCEDURE — 3078F DIAST BP <80 MM HG: CPT | Performed by: FAMILY MEDICINE

## 2024-04-23 PROCEDURE — 99213 OFFICE O/P EST LOW 20 MIN: CPT | Performed by: FAMILY MEDICINE

## 2024-04-23 PROCEDURE — 3074F SYST BP LT 130 MM HG: CPT | Performed by: FAMILY MEDICINE

## 2024-04-23 RX ORDER — OMEPRAZOLE 20 MG/1
20 CAPSULE, DELAYED RELEASE ORAL DAILY
Qty: 90 CAPSULE | Refills: 0 | Status: SHIPPED | OUTPATIENT
Start: 2024-04-23

## 2024-04-23 RX ORDER — SERTRALINE HYDROCHLORIDE 25 MG/1
25 TABLET, FILM COATED ORAL DAILY
Qty: 90 TABLET | Refills: 1 | Status: SHIPPED | OUTPATIENT
Start: 2024-04-23

## 2024-04-23 RX ORDER — DICYCLOMINE HCL 20 MG
20 TABLET ORAL EVERY 6 HOURS
Qty: 90 TABLET | Refills: 0 | Status: SHIPPED | OUTPATIENT
Start: 2024-04-23

## 2024-04-23 RX ORDER — BUSPIRONE HYDROCHLORIDE 5 MG/1
5 TABLET ORAL DAILY
Qty: 90 TABLET | Refills: 1 | Status: SHIPPED | OUTPATIENT
Start: 2024-04-23

## 2024-04-23 RX ORDER — INDOMETHACIN 50 MG/1
CAPSULE ORAL
Qty: 60 CAPSULE | Refills: 0 | Status: SHIPPED | OUTPATIENT
Start: 2024-04-23

## 2024-04-23 SDOH — ECONOMIC STABILITY: INCOME INSECURITY: HOW HARD IS IT FOR YOU TO PAY FOR THE VERY BASICS LIKE FOOD, HOUSING, MEDICAL CARE, AND HEATING?: SOMEWHAT HARD

## 2024-04-23 SDOH — ECONOMIC STABILITY: FOOD INSECURITY: WITHIN THE PAST 12 MONTHS, YOU WORRIED THAT YOUR FOOD WOULD RUN OUT BEFORE YOU GOT MONEY TO BUY MORE.: NEVER TRUE

## 2024-04-23 SDOH — ECONOMIC STABILITY: FOOD INSECURITY: WITHIN THE PAST 12 MONTHS, THE FOOD YOU BOUGHT JUST DIDN'T LAST AND YOU DIDN'T HAVE MONEY TO GET MORE.: NEVER TRUE

## 2024-04-23 ASSESSMENT — PATIENT HEALTH QUESTIONNAIRE - PHQ9
SUM OF ALL RESPONSES TO PHQ QUESTIONS 1-9: 0
2. FEELING DOWN, DEPRESSED OR HOPELESS: NOT AT ALL
SUM OF ALL RESPONSES TO PHQ9 QUESTIONS 1 & 2: 0
1. LITTLE INTEREST OR PLEASURE IN DOING THINGS: NOT AT ALL
SUM OF ALL RESPONSES TO PHQ QUESTIONS 1-9: 0

## 2024-04-23 NOTE — PROGRESS NOTES
Adwoa Plascencia is a 59 y.o. female complains of   Chief Complaint   Patient presents with    Diabetes    blood in stool    abnormal cramping    throat irritation    Fatigue    Cough     Hurts when breathing in and out       HPI: Here for follow up .  Last visit she was feeling upper respiratory symptoms. Still has feeling them. No fever. Feeling very fatigue.   Discussed that she suppose to complete the labs which she has not done. Could be from high sugar.  For now no sore throat or any chest congestion. No nausea or vomiting. No shortness of breath or chest pain. No audible wheezing. Able to talk in full sentence. Not using any extra respiratory muscle. Did not appear in any acute distress.   Does not need to take any inhaler.   Also still has bloating and stomach cramps. ? IBS and h/o anxiety. On zolot and taking it with compliance. No panic attack and anxiety been fairly stable. No more blood in stool. Has been up to date with colonoscopy./  Currently no abdominal pain.  No urinary or bowel complain.   Not taking vitamin D supplement.   Vitals been stable.   No headache or dizziness. No mood changes.       Allergies   Allergen Reactions    Codeine Other (See Comments)    Doxycycline Other (See Comments)    Morphine Other (See Comments)     \"chest burning\"  Chest burning      Albuterol Nausea And Vomiting       ROS:  Review of Systems    Medication :  Current Outpatient Medications   Medication Sig Dispense Refill    Phenyleph-Doxylamine-DM-APAP (KALLIE SELTZER PLUS PO) Take by mouth      sertraline (ZOLOFT) 25 MG tablet Take 1 tablet by mouth daily 90 tablet 1    omeprazole (PRILOSEC) 20 MG delayed release capsule Take 1 capsule by mouth daily 90 capsule 0    dicyclomine (BENTYL) 20 MG tablet Take 1 tablet by mouth every 6 hours 90 tablet 0    busPIRone (BUSPAR) 5 MG tablet Take 1 tablet by mouth daily 90 tablet 1    indomethacin (INDOCIN) 50 MG capsule take 1 capsule by mouth twice a day if needed 60 capsule

## 2024-04-23 NOTE — PATIENT INSTRUCTIONS
Salt water gargle. Cough drops.   Complete lab work as ordered and further discussion after lab result   Need to make an appointment with gyn .  Need to make an appointment for your diabetic eye exam.   Start taking vitamin d over the counter 2000 units daily .

## 2024-04-25 ENCOUNTER — HOSPITAL ENCOUNTER (OUTPATIENT)
Facility: HOSPITAL | Age: 59
Discharge: HOME | End: 2024-04-25
Payer: COMMERCIAL

## 2024-04-25 DIAGNOSIS — K92.1 BLOOD IN STOOL: ICD-10-CM

## 2024-04-25 DIAGNOSIS — Z11.4 SCREENING FOR HIV (HUMAN IMMUNODEFICIENCY VIRUS): ICD-10-CM

## 2024-04-25 DIAGNOSIS — R73.03 BORDERLINE DIABETES: ICD-10-CM

## 2024-04-25 DIAGNOSIS — R10.9 ABDOMINAL CRAMPS: ICD-10-CM

## 2024-04-25 LAB
ALBUMIN SERPL-MCNC: 3.8 G/DL (ref 3.4–5)
ALBUMIN/GLOB SERPL: 0.9 (ref 0.8–1.7)
ALP SERPL-CCNC: 126 U/L (ref 45–117)
ALT SERPL-CCNC: 31 U/L (ref 13–56)
ANION GAP SERPL CALC-SCNC: 7 MMOL/L (ref 3–18)
AST SERPL-CCNC: 19 U/L (ref 10–38)
BILIRUB SERPL-MCNC: 0.4 MG/DL (ref 0.2–1)
BUN SERPL-MCNC: 18 MG/DL (ref 7–18)
BUN/CREAT SERPL: 20 (ref 12–20)
CALCIUM SERPL-MCNC: 9.3 MG/DL (ref 8.5–10.1)
CHLORIDE SERPL-SCNC: 106 MMOL/L (ref 100–111)
CHOLEST SERPL-MCNC: 176 MG/DL
CO2 SERPL-SCNC: 25 MMOL/L (ref 21–32)
CREAT SERPL-MCNC: 0.9 MG/DL (ref 0.6–1.3)
ERYTHROCYTE [DISTWIDTH] IN BLOOD BY AUTOMATED COUNT: 13.9 % (ref 11.6–14.5)
EST. AVERAGE GLUCOSE BLD GHB EST-MCNC: 151 MG/DL
GLOBULIN SER CALC-MCNC: 4.1 G/DL (ref 2–4)
GLUCOSE SERPL-MCNC: 163 MG/DL (ref 74–99)
HBA1C MFR BLD: 6.9 % (ref 4.2–5.6)
HCT VFR BLD AUTO: 43 % (ref 35–45)
HDLC SERPL-MCNC: 37 MG/DL (ref 40–60)
HDLC SERPL: 4.8 (ref 0–5)
HGB BLD-MCNC: 13.6 G/DL (ref 12–16)
LDLC SERPL CALC-MCNC: 91.2 MG/DL (ref 0–100)
LIPID PANEL: ABNORMAL
MCH RBC QN AUTO: 28.8 PG (ref 24–34)
MCHC RBC AUTO-ENTMCNC: 31.6 G/DL (ref 31–37)
MCV RBC AUTO: 91.1 FL (ref 78–100)
NRBC # BLD: 0 K/UL (ref 0–0.01)
NRBC BLD-RTO: 0 PER 100 WBC
PLATELET # BLD AUTO: 217 K/UL (ref 135–420)
PMV BLD AUTO: 12.1 FL (ref 9.2–11.8)
POTASSIUM SERPL-SCNC: 4.2 MMOL/L (ref 3.5–5.5)
PROT SERPL-MCNC: 7.9 G/DL (ref 6.4–8.2)
RBC # BLD AUTO: 4.72 M/UL (ref 4.2–5.3)
SODIUM SERPL-SCNC: 138 MMOL/L (ref 136–145)
TRIGL SERPL-MCNC: 239 MG/DL
TSH SERPL DL<=0.05 MIU/L-ACNC: 1.44 UIU/ML (ref 0.36–3.74)
VLDLC SERPL CALC-MCNC: 47.8 MG/DL
WBC # BLD AUTO: 7.5 K/UL (ref 4.6–13.2)

## 2024-04-25 PROCEDURE — 83036 HEMOGLOBIN GLYCOSYLATED A1C: CPT

## 2024-04-25 PROCEDURE — 80053 COMPREHEN METABOLIC PANEL: CPT

## 2024-04-25 PROCEDURE — 87389 HIV-1 AG W/HIV-1&-2 AB AG IA: CPT

## 2024-04-25 PROCEDURE — 85027 COMPLETE CBC AUTOMATED: CPT

## 2024-04-25 PROCEDURE — 80061 LIPID PANEL: CPT

## 2024-04-25 PROCEDURE — 36415 COLL VENOUS BLD VENIPUNCTURE: CPT

## 2024-04-25 PROCEDURE — 84443 ASSAY THYROID STIM HORMONE: CPT

## 2024-04-26 LAB
HIV 1+2 AB+HIV1 P24 AG SERPL QL IA: NONREACTIVE
HIV 1/2 RESULT COMMENT: NORMAL

## 2024-05-07 RX ORDER — NIFEDIPINE 30 MG/1
30 TABLET, EXTENDED RELEASE ORAL DAILY
Qty: 90 TABLET | Refills: 1 | OUTPATIENT
Start: 2024-05-07

## 2024-05-08 RX ORDER — NIFEDIPINE 30 MG/1
30 TABLET, EXTENDED RELEASE ORAL DAILY
Qty: 90 TABLET | Refills: 2 | Status: SHIPPED | OUTPATIENT
Start: 2024-05-08

## 2024-05-13 RX ORDER — ATORVASTATIN CALCIUM 10 MG/1
TABLET, FILM COATED ORAL
Qty: 90 TABLET | Refills: 1 | Status: SHIPPED | OUTPATIENT
Start: 2024-05-13

## 2024-05-29 ENCOUNTER — OFFICE VISIT (OUTPATIENT)
Age: 59
End: 2024-05-29
Payer: COMMERCIAL

## 2024-05-29 VITALS
RESPIRATION RATE: 16 BRPM | HEART RATE: 85 BPM | HEIGHT: 65 IN | WEIGHT: 231.4 LBS | SYSTOLIC BLOOD PRESSURE: 124 MMHG | BODY MASS INDEX: 38.55 KG/M2 | DIASTOLIC BLOOD PRESSURE: 78 MMHG | TEMPERATURE: 97.5 F | OXYGEN SATURATION: 96 %

## 2024-05-29 DIAGNOSIS — R53.82 CHRONIC FATIGUE: Primary | ICD-10-CM

## 2024-05-29 DIAGNOSIS — I10 ESSENTIAL HYPERTENSION: ICD-10-CM

## 2024-05-29 DIAGNOSIS — F41.9 ANXIETY: ICD-10-CM

## 2024-05-29 DIAGNOSIS — E78.1 HYPERTRIGLYCERIDEMIA: ICD-10-CM

## 2024-05-29 DIAGNOSIS — R22.1 LOCALIZED SWELLING, MASS AND LUMP, NECK: ICD-10-CM

## 2024-05-29 DIAGNOSIS — E55.9 VITAMIN D DEFICIENCY: ICD-10-CM

## 2024-05-29 DIAGNOSIS — E11.9 DIABETES MELLITUS, NEW ONSET (HCC): ICD-10-CM

## 2024-05-29 PROCEDURE — 3044F HG A1C LEVEL LT 7.0%: CPT | Performed by: FAMILY MEDICINE

## 2024-05-29 PROCEDURE — 99213 OFFICE O/P EST LOW 20 MIN: CPT | Performed by: FAMILY MEDICINE

## 2024-05-29 PROCEDURE — 3074F SYST BP LT 130 MM HG: CPT | Performed by: FAMILY MEDICINE

## 2024-05-29 PROCEDURE — 3078F DIAST BP <80 MM HG: CPT | Performed by: FAMILY MEDICINE

## 2024-05-29 NOTE — PROGRESS NOTES
Adwoa Plascencia (:  1965) is a 59 y.o. female, Established patient, here for evaluation of the following chief complaint(s):  Results, Fatigue, and blood in stool         Assessment & Plan  1. Fatigue.  The patient's A1c levels are elevated, indicative of diabetes, which can contribute to her fatigue. Renal and hepatic functions are within normal parameters. A daily intake of 2000 units of vitamin D was recommended. Recheck vitamin d level with next labs     2. Lipoma. Soft tissue lump over upper chest and neck area. No pain. On and off since few years. No palpable lymphadenopathy   An ultrasound was ordered.    3. Anxiety.  A consultation with a psychiatrist or counselor was recommended.    4. Hypertriglyceridemia.  The patient was advised to adhere to a low-fat diet, engage in regular walking, and lose weight.    5. Hypertension.  The patient's blood pressure is well-controlled. Continuation of current medications was recommended.    6. New onset diabetes: life style modification. She will get back to me by end of week about decision starting metformin vs. Ozempic or trulicity. Labs prior next visit .    Pt understood and agree with the plan       Follow-up  A follow-up appointment is scheduled for 3 months from now.    Results  CMP:  Lab Results   Component Value Date/Time     2024 11:07 AM    K 4.2 2024 11:07 AM     2024 11:07 AM    CO2 25 2024 11:07 AM    BUN 18 2024 11:07 AM    CREATININE 0.90 2024 11:07 AM    GLUCOSE 163 2024 11:07 AM    CALCIUM 9.3 2024 11:07 AM    BILITOT 0.4 2024 11:07 AM    AST 19 2024 11:07 AM    ALT 31 2024 11:07 AM          CBC:  Lab Results   Component Value Date/Time    WBC 7.5 2024 11:07 AM    RBC 4.72 2024 11:07 AM    HGB 13.6 2024 11:07 AM    HCT 43.0 2024 11:07 AM    MCV 91.1 2024 11:07 AM    MCH 28.8 2024 11:07 AM    MCHC 31.6 2024 11:07 AM    RDW

## 2024-05-29 NOTE — PROGRESS NOTES
\"Have you been to the ER, urgent care clinic since your last visit?  Hospitalized since your last visit?\"    NO    “Have you seen or consulted any other health care providers outside of [facility] since your last visit?”    NO     “Have you had a pap smear?”    NO    No cervical cancer screening on file     Last dm eye exam Dr. eB - No    Chief Complaint   Patient presents with    Results    Fatigue    blood in stool           Click Here for Release of Records Request

## 2024-06-04 ENCOUNTER — HOSPITAL ENCOUNTER (OUTPATIENT)
Facility: HOSPITAL | Age: 59
Discharge: HOME | End: 2024-06-07
Attending: FAMILY MEDICINE
Payer: COMMERCIAL

## 2024-06-04 DIAGNOSIS — R22.1 LOCALIZED SWELLING, MASS AND LUMP, NECK: ICD-10-CM

## 2024-06-04 PROCEDURE — 76536 US EXAM OF HEAD AND NECK: CPT

## 2024-06-18 NOTE — TELEPHONE ENCOUNTER
[facility]    Cardiology Progress Note    Primary Cardiologist: Dr. Sung    Date of Admission: 6/12/2024  Service Date: 06/18/2024     Summary:  Mr. Cirilo Gardner is a very pleasant 66 year old male with a past medical history of obesity and no other known medical history (has not seen a doctor in many years) who was admitted on 6/12/2024 after presenting with confusion and expressive/receptive aphasia along with a right visual cut and being found to have an acute left MCA stroke. Cardiology was consulted as the patient was found to have a new diagnosis of a cardiomyopathy with severe LV systolic dysfunction, ejection fraction 15-20% and LV apical thrombus on echocardiogram.    Interval History   No acute events overnight. Patient resting comfortably in recliner. Denies chest pain. Has occasional shortness of breath with exertion. He has difficulty word finding but states it is getting better. Weight down 15 lbs. Net -6.6 L. Electrolytes and creatinine within normal limits.       Telemetry: Sinus rhythm    Assessment:  Severe LV systolic dysfunction (newly diagnosed), ejection fraction 15-20%  - Etiology: Unclear at this time--Ischemic vs. other  - Fluid status: Challenging to assess due to body habitus. EDW Unknown--Most likely closer to 270-275 lbs or possibly lower. Patient poor historian  - Diuretic regimen: None PTA.   - Ischemic evaluation: Needs to be completed.     Guideline directed medical therapy (GDMT):  - Beta blocker: Carvedilol 25 mg BID  - ACEI/ARB/ARNI: Entresto 49-51 mg BID   - Aldactone antagonist: Spironolactone 25 mg daily   - SGLT2 inhibitor: Jardiance 10 mg once daily.    LV apical thrombus measuring 1.5 x 1.9 cm  Acute left MCA stroke likely due to #2  Severe expressive/receptive aphasia  Confusion  Mildly elevated troponin, likely demand  Obesity    Plan:   1.  Continue cardiology medication regimen    -atorvastatin 40 mg daily    -jardiance 10 mg daily  Called patient and left message to please call office to schedule follow-up as only 15 days supply was sent and an appt is required for further refills.    -IV lasix 60 mg BID   2. Increase carvedilol to 25 mg daily  3. Increase Entresto to 49-51 mg BID   4. Start spironolactone 25 mg daily   5. CT coronary angiogram ordered for today. Results pending   6. Daily standing weight, strict I/O, daily BMP and repletion of electrolytes   7. Coordinating outpatient follow up   8. Cardiology to follow     Plan of care was formulated under the direction and guidance of Dr. Barraza.         Ashley Yao PA-C  Physician Assistant   [facility]  Pager: [phone]    Patient Active Problem List   Diagnosis    Confusion    Elevated troponin    Visual field cut    Acute CVA (cerebrovascular accident) (H)       Physical Exam   Temp: 98  F (36.7  C) Temp src: Axillary BP: 128/72 Pulse: 73   Resp: 20 SpO2: 96 % O2 Device: None (Room air)    Vitals:    06/16/24 1354 06/17/24 0906 06/18/24 0759   Weight: 125.1 kg (275 lb 14.4 oz) 123.8 kg (273 lb) 117.4 kg (258 lb 12.8 oz)     Vital Signs with Ranges  Temp:  [97.9  F (36.6  C)-99.1  F (37.3  C)] 98  F (36.7  C)  Pulse:  [66-94] 73  Resp:  [16-20] 20  BP: (126-193)/() 128/72  SpO2:  [92 %-97 %] 96 %  I/O last 3 completed shifts:  In: 365 [P.O.:240; I.V.:125]  Out: 7000 [Urine:7000]    General: Appears his stated age, well nourished, and in no acute distress, resting in recliner   Eyes: No scleral icterus.  HEENT: Neck supple. JVD not appreciated   Cardiovascular: Regular rate and rhythm, normal S1 and S2. No murmur, rub, or gallop.  Extremities: Moves all extremities well and symmetrically. There is at least 1+ lower extremity edema in the ankles to the knees bilaterally.  Respiratory: Breathing non-labored. Lungs clear to auscultation with no crackles or wheezes bilaterally.  Skin: No pallor or cyanosis.  Gastrointestinal: Non-distended.  Psych: Appropriate affect. Mentation normal.  Neurological: No gross focal deficits.    Medications   Current Facility-Administered Medications   Medication Dose Route  Frequency Provider Last Rate Last Admin    medication instruction - No oral meds if patient didn't pass dysphagia screen   Does not apply Continuous PRN Cameron Frederick PA-C        Medication Instructions - Avoid dextrose in IV solutions.   Intravenous Continuous PRN Cameron Frederick PA-C         Current Facility-Administered Medications   Medication Dose Route Frequency Provider Last Rate Last Admin    atorvastatin (LIPITOR) tablet 40 mg  40 mg Oral QPM Medina, Radha CRAIN PA-C   40 mg at 06/16/24 1949    carvedilol (COREG) tablet 25 mg  25 mg Oral BID w/meals Ashley Yao PA-C        empagliflozin (JARDIANCE) tablet 10 mg  10 mg Oral Daily Margarita Marvin CNP   10 mg at 06/17/24 0837    enoxaparin ANTICOAGULANT (LOVENOX) injection 120 mg  1 mg/kg Subcutaneous Q12H Luciano Gallagher MD        furosemide (LASIX) injection 60 mg  60 mg Intravenous BID Ashley Yao PA-C   60 mg at 06/18/24 0945    insulin aspart (NovoLOG) injection (RAPID ACTING)  1-7 Units Subcutaneous TID AC Louisa Townsend DO        insulin aspart (NovoLOG) injection (RAPID ACTING)  1-5 Units Subcutaneous At Bedtime Louisa Townsend DO        levETIRAcetam (KEPPRA) 1,500 mg in sodium chloride 0.9 % 125 mL intermittent infusion  1,500 mg Intravenous Q12H Trista Glover  mL/hr at 06/18/24 0647 1,500 mg at 06/18/24 0647    sacubitril-valsartan (ENTRESTO) 49-51 MG per tablet 1 tablet  1 tablet Oral BID Ashley Yao PA-C        sodium chloride (PF) 0.9% PF flush 3 mL  3 mL Intracatheter Q8H Cameron Frederick PA-C   3 mL at 06/18/24 0947    spironolactone (ALDACTONE) tablet 25 mg  25 mg Oral Daily Ashley Yao PA-C   25 mg at 06/18/24 1205    warfarin ANTICOAGULANT (COUMADIN) tablet 7.5 mg  7.5 mg Oral Once Redd Saavedra MD        Warfarin Dose Required Daily - Pharmacist Managed  1 each Oral See Admin Instructions Steven Barraza MD         Data   Recent Labs   Lab 06/17/24  1800  06/16/24  0731 06/15/24  0612   WBC 8.7 8.2 8.6   HGB 17.0 15.3 14.2   HCT 50.2 47.2 44.5   MCV 94 97 96    310 281     Recent Labs   Lab 06/18/24  0803 06/18/24  0755 06/17/24  2117 06/17/24  1808 06/17/24  1129 06/17/24  1026   NA  --  142  --  140  --  143   POTASSIUM  --  3.5  --  3.5  --  3.4   CHLORIDE  --  105  --  100  --  104   CO2  --  24  --  24  --  27   ANIONGAP  --  13  --  16*  --  12   GLC 99 96 115* 126*   < > 121*   BUN  --  17.9  --  24.4*  --  24.3*   CR  --  0.94  --  1.18*  --  1.15   GFRESTIMATED  --  >90  --  69  --  71   PARISH  --  9.0  --  9.5  --  9.4    < > = values in this interval not displayed.        Please kindly note that this document was completed in part using Dragon voice recognition software. Although reviewed after completion, some word substitutions and typographical errors may occur. Please contact me if clarification is needed.

## 2024-06-26 DIAGNOSIS — F40.243 ANXIETY WITH FLYING: Primary | ICD-10-CM

## 2024-06-26 DIAGNOSIS — M54.9 DISCOMFORT OF BACK: ICD-10-CM

## 2024-06-26 RX ORDER — CYCLOBENZAPRINE HCL 5 MG
5 TABLET ORAL NIGHTLY PRN
Qty: 30 TABLET | Refills: 0 | Status: SHIPPED | OUTPATIENT
Start: 2024-06-26

## 2024-06-26 RX ORDER — ALPRAZOLAM 0.5 MG/1
0.5 TABLET ORAL DAILY PRN
Qty: 7 TABLET | Refills: 0 | Status: SHIPPED | OUTPATIENT
Start: 2024-06-26 | End: 2024-07-03

## 2024-06-27 RX ORDER — ATORVASTATIN CALCIUM 10 MG/1
10 TABLET, FILM COATED ORAL DAILY
Qty: 90 TABLET | Refills: 3 | Status: SHIPPED | OUTPATIENT
Start: 2024-06-27

## 2024-07-01 RX ORDER — BUSPIRONE HYDROCHLORIDE 5 MG/1
5 TABLET ORAL DAILY
Qty: 90 TABLET | Refills: 1 | Status: SHIPPED | OUTPATIENT
Start: 2024-07-01

## 2024-07-26 RX ORDER — OMEPRAZOLE 20 MG/1
20 CAPSULE, DELAYED RELEASE ORAL DAILY
Qty: 90 CAPSULE | Refills: 1 | Status: SHIPPED | OUTPATIENT
Start: 2024-07-26

## 2024-08-20 RX ORDER — METOPROLOL SUCCINATE 25 MG/1
25 TABLET, EXTENDED RELEASE ORAL DAILY
Qty: 90 TABLET | Refills: 0 | Status: SHIPPED | OUTPATIENT
Start: 2024-08-20

## 2024-09-09 RX ORDER — LISINOPRIL 20 MG/1
20 TABLET ORAL DAILY
Qty: 90 TABLET | Refills: 1 | Status: SHIPPED | OUTPATIENT
Start: 2024-09-09

## 2024-09-11 ENCOUNTER — OFFICE VISIT (OUTPATIENT)
Facility: CLINIC | Age: 59
End: 2024-09-11
Payer: COMMERCIAL

## 2024-09-11 VITALS
HEART RATE: 82 BPM | DIASTOLIC BLOOD PRESSURE: 80 MMHG | SYSTOLIC BLOOD PRESSURE: 122 MMHG | BODY MASS INDEX: 37.82 KG/M2 | HEIGHT: 65 IN | RESPIRATION RATE: 16 BRPM | OXYGEN SATURATION: 96 % | WEIGHT: 227 LBS | TEMPERATURE: 97.2 F

## 2024-09-11 DIAGNOSIS — R45.1 RESTLESS: ICD-10-CM

## 2024-09-11 DIAGNOSIS — E11.9 DIABETES MELLITUS, NEW ONSET (HCC): ICD-10-CM

## 2024-09-11 DIAGNOSIS — K21.9 GASTROESOPHAGEAL REFLUX DISEASE WITHOUT ESOPHAGITIS: ICD-10-CM

## 2024-09-11 DIAGNOSIS — M54.9 DISCOMFORT OF BACK: ICD-10-CM

## 2024-09-11 DIAGNOSIS — I10 ESSENTIAL HYPERTENSION: Primary | ICD-10-CM

## 2024-09-11 DIAGNOSIS — E07.9 THYROID CONDITION: ICD-10-CM

## 2024-09-11 DIAGNOSIS — K58.9 IRRITABLE BOWEL SYNDROME, UNSPECIFIED TYPE: ICD-10-CM

## 2024-09-11 DIAGNOSIS — F41.9 ANXIETY: ICD-10-CM

## 2024-09-11 PROCEDURE — 3079F DIAST BP 80-89 MM HG: CPT | Performed by: FAMILY MEDICINE

## 2024-09-11 PROCEDURE — 99214 OFFICE O/P EST MOD 30 MIN: CPT | Performed by: FAMILY MEDICINE

## 2024-09-11 PROCEDURE — 3044F HG A1C LEVEL LT 7.0%: CPT | Performed by: FAMILY MEDICINE

## 2024-09-11 PROCEDURE — 3074F SYST BP LT 130 MM HG: CPT | Performed by: FAMILY MEDICINE

## 2024-09-11 RX ORDER — BUSPIRONE HYDROCHLORIDE 5 MG/1
5 TABLET ORAL 2 TIMES DAILY
Qty: 180 TABLET | Refills: 1 | Status: SHIPPED | OUTPATIENT
Start: 2024-09-11

## 2024-09-11 ASSESSMENT — PATIENT HEALTH QUESTIONNAIRE - PHQ9
SUM OF ALL RESPONSES TO PHQ QUESTIONS 1-9: 0
SUM OF ALL RESPONSES TO PHQ QUESTIONS 1-9: 0
1. LITTLE INTEREST OR PLEASURE IN DOING THINGS: NOT AT ALL
SUM OF ALL RESPONSES TO PHQ QUESTIONS 1-9: 0
SUM OF ALL RESPONSES TO PHQ QUESTIONS 1-9: 0
SUM OF ALL RESPONSES TO PHQ9 QUESTIONS 1 & 2: 0
2. FEELING DOWN, DEPRESSED OR HOPELESS: NOT AT ALL

## 2024-09-13 RX ORDER — DICYCLOMINE HCL 20 MG
20 TABLET ORAL EVERY 6 HOURS
Qty: 90 TABLET | Refills: 0 | Status: SHIPPED | OUTPATIENT
Start: 2024-09-13

## 2024-10-09 NOTE — TELEPHONE ENCOUNTER
Last OV 09/11/2024  Next OV none scheduled, Return in about 4 months (around 1/11/2025)   Last lab 04/25/2024

## 2024-10-29 RX ORDER — SERTRALINE HYDROCHLORIDE 25 MG/1
25 TABLET, FILM COATED ORAL DAILY
Qty: 90 TABLET | Refills: 1 | Status: SHIPPED | OUTPATIENT
Start: 2024-10-29

## 2024-11-20 RX ORDER — METOPROLOL SUCCINATE 25 MG/1
25 TABLET, EXTENDED RELEASE ORAL DAILY
Qty: 90 TABLET | Refills: 0 | OUTPATIENT
Start: 2024-11-20

## 2024-11-21 ENCOUNTER — TELEPHONE (OUTPATIENT)
Age: 59
End: 2024-11-21

## 2024-11-21 RX ORDER — METOPROLOL SUCCINATE 25 MG/1
25 TABLET, EXTENDED RELEASE ORAL DAILY
Qty: 90 TABLET | Refills: 0 | Status: SHIPPED | OUTPATIENT
Start: 2024-11-21

## 2025-01-16 NOTE — TELEPHONE ENCOUNTER
This patient contacted the office for the following prescriptions to be refilled:    Medication requested :   Requested Prescriptions     Pending Prescriptions Disp Refills    omeprazole (PRILOSEC) 20 MG delayed release capsule [Pharmacy Med Name: OMEPRAZOLE DR 20 MG CAPSULE] 90 capsule 1     Sig: take 1 capsule by mouth once daily        Last Refilled: 7/26/2024    Last Office Visit: 9/11/2024    Next Office Visit:Overdue, notified     Last Labs:4/25/2024

## 2025-01-29 RX ORDER — NIFEDIPINE 30 MG/1
30 TABLET, EXTENDED RELEASE ORAL DAILY
Qty: 90 TABLET | Refills: 2 | OUTPATIENT
Start: 2025-01-29

## 2025-01-30 NOTE — TELEPHONE ENCOUNTER
This patient contacted office for the following prescriptions to be filled:    Medication requested :   dicyclomine (BENTYL) 20 MG tablet   PCP: micheline  Pharmacy or Print: [facility]  Mail order or Local pharmacy Belinda Farley     Scheduled appointment if not seen by current providers in office: LOV 9/11/2024 LÓPEZ 2/25/2025

## 2025-01-31 RX ORDER — DICYCLOMINE HCL 20 MG
20 TABLET ORAL EVERY 6 HOURS
Qty: 90 TABLET | Refills: 0 | Status: SHIPPED | OUTPATIENT
Start: 2025-01-31

## 2025-02-04 RX ORDER — NIFEDIPINE 30 MG/1
30 TABLET, EXTENDED RELEASE ORAL DAILY
Qty: 90 TABLET | Refills: 2 | OUTPATIENT
Start: 2025-02-04

## 2025-02-05 ENCOUNTER — TELEPHONE (OUTPATIENT)
Facility: CLINIC | Age: 60
End: 2025-02-05

## 2025-02-05 NOTE — TELEPHONE ENCOUNTER
[facility]iology [facility] called and states that pt has missed 5 appts in a row. They have dismissed her from the practice. They would like to know if Dr Kirby will fill her RX   metoprolol succinate (TOPROL XL) 25 MG extended release tablet QTY 90     atorvastatin (LIPITOR) 10 MG tablet  it looks like she still has refills on that.   NIFEdipine (PROCARDIA XL) 30 MG extended release tablet QTY 90  Till she can get a new cardiologist. Please advise [facility] [address]  LOV 9/11/2024 FU 2/25/2025

## 2025-02-06 RX ORDER — NIFEDIPINE 30 MG/1
30 TABLET, EXTENDED RELEASE ORAL DAILY
Qty: 90 TABLET | Refills: 0 | Status: SHIPPED | OUTPATIENT
Start: 2025-02-06

## 2025-02-10 RX ORDER — METOPROLOL SUCCINATE 25 MG/1
25 TABLET, EXTENDED RELEASE ORAL DAILY
Qty: 90 TABLET | Refills: 0 | OUTPATIENT
Start: 2025-02-10

## 2025-02-17 RX ORDER — DICYCLOMINE HCL 20 MG
20 TABLET ORAL EVERY 6 HOURS
Qty: 90 TABLET | Refills: 0 | Status: SHIPPED | OUTPATIENT
Start: 2025-02-17

## 2025-02-20 RX ORDER — LISINOPRIL 20 MG/1
20 TABLET ORAL DAILY
Qty: 90 TABLET | Refills: 1 | Status: SHIPPED | OUTPATIENT
Start: 2025-02-20

## 2025-02-21 RX ORDER — METOPROLOL SUCCINATE 25 MG/1
25 TABLET, EXTENDED RELEASE ORAL DAILY
Qty: 90 TABLET | Refills: 0 | Status: SHIPPED | OUTPATIENT
Start: 2025-02-21

## 2025-03-07 ENCOUNTER — HOSPITAL ENCOUNTER (OUTPATIENT)
Facility: HOSPITAL | Age: 60
Discharge: HOME | End: 2025-03-10
Payer: COMMERCIAL

## 2025-03-07 DIAGNOSIS — E55.9 VITAMIN D DEFICIENCY: ICD-10-CM

## 2025-03-07 DIAGNOSIS — E78.1 HYPERTRIGLYCERIDEMIA: ICD-10-CM

## 2025-03-07 DIAGNOSIS — E11.9 DIABETES MELLITUS, NEW ONSET (HCC): ICD-10-CM

## 2025-03-07 LAB
25(OH)D3 SERPL-MCNC: 22.1 NG/ML (ref 30–100)
ALBUMIN SERPL-MCNC: 3.7 G/DL (ref 3.4–5)
ALBUMIN/GLOB SERPL: 0.8 (ref 0.8–1.7)
ALP SERPL-CCNC: 130 U/L (ref 45–117)
ALT SERPL-CCNC: 31 U/L (ref 13–56)
ANION GAP SERPL CALC-SCNC: 6 MMOL/L (ref 3–18)
AST SERPL-CCNC: 17 U/L (ref 10–38)
BILIRUB SERPL-MCNC: 0.4 MG/DL (ref 0.2–1)
BUN SERPL-MCNC: 11 MG/DL (ref 7–18)
BUN/CREAT SERPL: 12 (ref 12–20)
CALCIUM SERPL-MCNC: 9.2 MG/DL (ref 8.5–10.1)
CHLORIDE SERPL-SCNC: 107 MMOL/L (ref 100–111)
CHOLEST SERPL-MCNC: 181 MG/DL
CO2 SERPL-SCNC: 28 MMOL/L (ref 21–32)
CREAT SERPL-MCNC: 0.89 MG/DL (ref 0.6–1.3)
EST. AVERAGE GLUCOSE BLD GHB EST-MCNC: 146 MG/DL
GLOBULIN SER CALC-MCNC: 4.5 G/DL (ref 2–4)
GLUCOSE SERPL-MCNC: 161 MG/DL (ref 74–99)
HBA1C MFR BLD: 6.7 % (ref 4.2–5.6)
HDLC SERPL-MCNC: 41 MG/DL (ref 40–60)
HDLC SERPL: 4.4 (ref 0–5)
LDLC SERPL CALC-MCNC: 78 MG/DL (ref 0–100)
LIPID PANEL: ABNORMAL
POTASSIUM SERPL-SCNC: 4.2 MMOL/L (ref 3.5–5.5)
PROT SERPL-MCNC: 8.2 G/DL (ref 6.4–8.2)
SODIUM SERPL-SCNC: 141 MMOL/L (ref 136–145)
TRIGL SERPL-MCNC: 310 MG/DL
VLDLC SERPL CALC-MCNC: 62 MG/DL

## 2025-03-07 PROCEDURE — 82306 VITAMIN D 25 HYDROXY: CPT

## 2025-03-07 PROCEDURE — 36415 COLL VENOUS BLD VENIPUNCTURE: CPT

## 2025-03-07 PROCEDURE — 83036 HEMOGLOBIN GLYCOSYLATED A1C: CPT

## 2025-03-07 PROCEDURE — 80061 LIPID PANEL: CPT

## 2025-03-07 PROCEDURE — 80053 COMPREHEN METABOLIC PANEL: CPT

## 2025-03-10 ENCOUNTER — HOSPITAL ENCOUNTER (OUTPATIENT)
Facility: HOSPITAL | Age: 60
Setting detail: SPECIMEN
Discharge: HOME | End: 2025-03-13
Payer: COMMERCIAL

## 2025-03-10 ENCOUNTER — OFFICE VISIT (OUTPATIENT)
Facility: CLINIC | Age: 60
End: 2025-03-10
Payer: COMMERCIAL

## 2025-03-10 VITALS
HEIGHT: 65 IN | RESPIRATION RATE: 16 BRPM | DIASTOLIC BLOOD PRESSURE: 66 MMHG | SYSTOLIC BLOOD PRESSURE: 100 MMHG | WEIGHT: 223 LBS | OXYGEN SATURATION: 96 % | BODY MASS INDEX: 37.15 KG/M2 | TEMPERATURE: 97.9 F | HEART RATE: 86 BPM

## 2025-03-10 DIAGNOSIS — R10.9 ABDOMINAL DISCOMFORT: ICD-10-CM

## 2025-03-10 DIAGNOSIS — Z12.31 ENCOUNTER FOR SCREENING MAMMOGRAM FOR MALIGNANT NEOPLASM OF BREAST: ICD-10-CM

## 2025-03-10 DIAGNOSIS — R23.2 HOT FLASHES: Primary | ICD-10-CM

## 2025-03-10 DIAGNOSIS — F41.9 ANXIETY: ICD-10-CM

## 2025-03-10 DIAGNOSIS — E55.9 VITAMIN D DEFICIENCY: ICD-10-CM

## 2025-03-10 DIAGNOSIS — E11.9 DIABETES MELLITUS, NEW ONSET (HCC): ICD-10-CM

## 2025-03-10 DIAGNOSIS — Z12.4 SCREENING FOR CERVICAL CANCER: ICD-10-CM

## 2025-03-10 DIAGNOSIS — E78.1 HYPERTRIGLYCERIDEMIA: ICD-10-CM

## 2025-03-10 DIAGNOSIS — R45.86 MOOD CHANGES: ICD-10-CM

## 2025-03-10 DIAGNOSIS — M54.9 DISCOMFORT OF BACK: ICD-10-CM

## 2025-03-10 DIAGNOSIS — E66.01 MORBID (SEVERE) OBESITY DUE TO EXCESS CALORIES: ICD-10-CM

## 2025-03-10 LAB
CREAT UR-MCNC: 243 MG/DL (ref 30–125)
MICROALBUMIN UR-MCNC: 1.92 MG/DL (ref 0–3)
MICROALBUMIN/CREAT UR-RTO: 8 MG/G (ref 0–30)

## 2025-03-10 PROCEDURE — 3044F HG A1C LEVEL LT 7.0%: CPT | Performed by: FAMILY MEDICINE

## 2025-03-10 PROCEDURE — 82043 UR ALBUMIN QUANTITATIVE: CPT

## 2025-03-10 PROCEDURE — 99214 OFFICE O/P EST MOD 30 MIN: CPT | Performed by: FAMILY MEDICINE

## 2025-03-10 PROCEDURE — 3074F SYST BP LT 130 MM HG: CPT | Performed by: FAMILY MEDICINE

## 2025-03-10 PROCEDURE — 3078F DIAST BP <80 MM HG: CPT | Performed by: FAMILY MEDICINE

## 2025-03-10 PROCEDURE — 82570 ASSAY OF URINE CREATININE: CPT

## 2025-03-10 RX ORDER — VENLAFAXINE HYDROCHLORIDE 37.5 MG/1
37.5 CAPSULE, EXTENDED RELEASE ORAL DAILY
Qty: 30 CAPSULE | Refills: 3 | Status: SHIPPED | OUTPATIENT
Start: 2025-03-10

## 2025-03-10 RX ORDER — DAPAGLIFLOZIN 10 MG/1
10 TABLET, FILM COATED ORAL EVERY MORNING
Qty: 90 TABLET | Refills: 0 | Status: SHIPPED | OUTPATIENT
Start: 2025-03-10

## 2025-03-10 RX ORDER — BIMATOPROST 0.1 MG/ML
1 SOLUTION/ DROPS OPHTHALMIC NIGHTLY
COMMUNITY

## 2025-03-10 RX ORDER — FENOFIBRATE 145 MG/1
145 TABLET, COATED ORAL DAILY
Qty: 30 TABLET | Refills: 3 | Status: SHIPPED | OUTPATIENT
Start: 2025-03-10

## 2025-03-10 SDOH — ECONOMIC STABILITY: FOOD INSECURITY: WITHIN THE PAST 12 MONTHS, THE FOOD YOU BOUGHT JUST DIDN'T LAST AND YOU DIDN'T HAVE MONEY TO GET MORE.: NEVER TRUE

## 2025-03-10 SDOH — ECONOMIC STABILITY: FOOD INSECURITY: WITHIN THE PAST 12 MONTHS, YOU WORRIED THAT YOUR FOOD WOULD RUN OUT BEFORE YOU GOT MONEY TO BUY MORE.: NEVER TRUE

## 2025-03-10 ASSESSMENT — PATIENT HEALTH QUESTIONNAIRE - PHQ9
2. FEELING DOWN, DEPRESSED OR HOPELESS: NOT AT ALL
SUM OF ALL RESPONSES TO PHQ QUESTIONS 1-9: 0
1. LITTLE INTEREST OR PLEASURE IN DOING THINGS: NOT AT ALL

## 2025-03-10 ASSESSMENT — ANXIETY QUESTIONNAIRES
3. WORRYING TOO MUCH ABOUT DIFFERENT THINGS: SEVERAL DAYS
2. NOT BEING ABLE TO STOP OR CONTROL WORRYING: SEVERAL DAYS
6. BECOMING EASILY ANNOYED OR IRRITABLE: MORE THAN HALF THE DAYS
1. FEELING NERVOUS, ANXIOUS, OR ON EDGE: SEVERAL DAYS
7. FEELING AFRAID AS IF SOMETHING AWFUL MIGHT HAPPEN: NOT AT ALL
4. TROUBLE RELAXING: MORE THAN HALF THE DAYS
5. BEING SO RESTLESS THAT IT IS HARD TO SIT STILL: SEVERAL DAYS
GAD7 TOTAL SCORE: 8
IF YOU CHECKED OFF ANY PROBLEMS ON THIS QUESTIONNAIRE, HOW DIFFICULT HAVE THESE PROBLEMS MADE IT FOR YOU TO DO YOUR WORK, TAKE CARE OF THINGS AT HOME, OR GET ALONG WITH OTHER PEOPLE: SOMEWHAT DIFFICULT

## 2025-03-10 NOTE — PROGRESS NOTES
Adwoa Plascencia (:  1965) is a 59 y.o. female, Established patient, here for evaluation of the following chief complaint(s):  Hypertension, Thyroid Problem, Gastroesophageal Reflux, Anxiety, and Hot flashes         Assessment & Plan  1. Diabetes mellitus.  Due to gastrointestinal upset and diarrhea, metformin will be discontinued and replaced with Farxiga. She is advised to maintain a low-carbohydrate, diabetic diet, and information has been provided in the after-visit summary. Laboratory tests will be repeated before the next visit in 3 months.    2. Hot flashes and mood changes.  Effexor will be added to her current regimen. She will continue taking BuSpar, and the response will be monitored.    3. Low vitamin D.  Recent laboratory results indicate a deficiency in vitamin D. She is advised to start a daily intake of 2000 units of over-the-counter vitamin D. Levels will be rechecked before the next visit.    4. Elevated BMI.  She is encouraged to continue working on portion control, dietary choices, and exercise.    5. Elevated triglycerides.  Dietary modifications are crucial, and she is advised to avoid fried and fatty foods. Information has been provided in the after-visit summary. Tricor will be added to her current Lipitor regimen, both to be taken at night. Liver function will be rechecked. If she experiences muscle aches, leg cramps, or unusual fatigue, she should inform the provider.    6. Abdominal discomfort, intermittent diarrhea, and nausea.  These symptoms could be indicative of irritable bowel syndrome (IBS). She is advised to continue her acid reflux medication and start a probiotic regimen. Metformin will be discontinued. If symptoms persist, she should inform the provider, and a referral to a gastroenterologist may be considered. She is advised to schedule an appointment with the gastroenterologist if there is no improvement in symptoms.    7. Chronic back discomfort.  Symptomatic

## 2025-03-10 NOTE — PROGRESS NOTES
\"Have you been to the ER, urgent care clinic since your last visit?  Hospitalized since your last visit?\"    [facility] ED LOV: 12/28/24    “Have you seen or consulted any other health care providers outside our system since your last visit?”    Dr. Be LOV: 2/04/25    Have you had a mammogram?”   NO    Date of last Mammogram: 11/8/2022      “Have you had a pap smear?”    Last pap smear - hysterectomy     No cervical cancer screening on file       “Have you had a diabetic eye exam?”    Last dm eye exam was on 2/04/25 - Dr. Be. Record has been requested.    No diabetic eye exam on file            Chief Complaint   Patient presents with    Hypertension    Thyroid Problem    Gastroesophageal Reflux    Anxiety

## 2025-03-11 ENCOUNTER — RESULTS FOLLOW-UP (OUTPATIENT)
Facility: HOSPITAL | Age: 60
End: 2025-03-11

## 2025-03-18 RX ORDER — DICYCLOMINE HCL 20 MG
20 TABLET ORAL EVERY 6 HOURS
Qty: 90 TABLET | Refills: 0 | Status: SHIPPED | OUTPATIENT
Start: 2025-03-18

## 2025-03-18 NOTE — TELEPHONE ENCOUNTER
This patient contacted the office for the following prescriptions to be refilled:    Medication requested :   Requested Prescriptions     Pending Prescriptions Disp Refills    dicyclomine (BENTYL) 20 MG tablet [Pharmacy Med Name: DICYCLOMINE 20 MG TABLET] 90 tablet 0     Sig: take 1 tablet by mouth every 6 hours        Last Refilled: 2/17/2025    Last Office Visit: 9/11/2024    Next Office Visit: 3/10/2025    Last Labs:3/7/2025

## 2025-03-27 ENCOUNTER — OFFICE VISIT (OUTPATIENT)
Age: 60
End: 2025-03-27
Payer: COMMERCIAL

## 2025-03-27 VITALS
DIASTOLIC BLOOD PRESSURE: 59 MMHG | OXYGEN SATURATION: 96 % | HEART RATE: 81 BPM | SYSTOLIC BLOOD PRESSURE: 109 MMHG | HEIGHT: 65 IN | BODY MASS INDEX: 36.49 KG/M2 | WEIGHT: 219 LBS

## 2025-03-27 DIAGNOSIS — I10 ESSENTIAL (PRIMARY) HYPERTENSION: ICD-10-CM

## 2025-03-27 DIAGNOSIS — E78.2 MIXED HYPERLIPIDEMIA: ICD-10-CM

## 2025-03-27 DIAGNOSIS — E66.01 SEVERE OBESITY (BMI 35.0-39.9) WITH COMORBIDITY: ICD-10-CM

## 2025-03-27 DIAGNOSIS — Q24.5 ANOMALOUS CORONARY ARTERY ORIGIN: ICD-10-CM

## 2025-03-27 DIAGNOSIS — I25.84 CORONARY ATHEROSCLEROSIS DUE TO CALCIFIED CORONARY LESION (CODE): Primary | ICD-10-CM

## 2025-03-27 PROCEDURE — 3078F DIAST BP <80 MM HG: CPT | Performed by: NURSE PRACTITIONER

## 2025-03-27 PROCEDURE — 93000 ELECTROCARDIOGRAM COMPLETE: CPT | Performed by: NURSE PRACTITIONER

## 2025-03-27 PROCEDURE — 99214 OFFICE O/P EST MOD 30 MIN: CPT | Performed by: NURSE PRACTITIONER

## 2025-03-27 PROCEDURE — 3074F SYST BP LT 130 MM HG: CPT | Performed by: NURSE PRACTITIONER

## 2025-03-27 NOTE — PROGRESS NOTES
1. Have you been to the ER, urgent care clinic since your last visit?  Hospitalized since your last visit?     Yes When: 12/28/24 Where: an Reason for visit: no      2.  Where do you normally have your labs drawn?       3. Do you need any refills today?   no          
Mixed hyperlipidemia        4. Anomalous coronary artery origin  CTA HEART W 3D      5. Severe obesity (BMI 35.0-39.9) with comorbidity            Medications Discontinued During This Encounter   Medication Reason    cyclobenzaprine (FLEXERIL) 5 MG tablet LIST CLEANUP       9/2019 Referred for episodes of dizziness with diaphoresis, nausea and SOB.  Episodes occur with rest and last 10-30 minutes. She also reports occasional chest pain. She has strong family h/o CAD with mother having MI in 20s.  Will obtain non-invasive evaluation due to symptoms and risk factors with stress test, echo, calcium score, event monitor and tilt table.  To f/u post testing.  10/2019  Abnormal stress test with atypical chest pain strong family history and minimal calcium abnormality on CAT scan.  Will proceed with cardiac catheterization for further evaluation.  Decreased nifedipine due to positive tilt table test with vasodepressor response.  Add low-dose Toprol.  Hydration and stockings.  12/2019  Exertional shortness of breath and chest tightness.  Anomalous right coronary artery will get a CT scan to evaluate for coronary course  12/2019  CTA showing malignant course of right coronary artery although reviewing all the data with normal perfusion in inferior wall and negative EKG stress test less likely that there is angina equivalent as a cause of her shortness of breath.  I will get PFT.  For time being continue control of blood pressure.  Discussed option which would require surgical correction which we would hold off at this point  6/2020-virtual visit  Cardiac status stable.  Mild restrictive disease on PFT likely related to obesity continue diet exercise weight loss monitor anginal symptoms are stable  6/2023  Cardiac status is stable.  B/P is controlled will continue current medications.  Recent CMP, CBC Lipids reviewed and discussed with patient - LDL mildly elevated 101, with triglycerides 255 - recommend to increase lipitor,

## 2025-04-02 RX ORDER — DICYCLOMINE HCL 20 MG
20 TABLET ORAL EVERY 6 HOURS
Qty: 90 TABLET | Refills: 0 | OUTPATIENT
Start: 2025-04-02

## 2025-05-01 RX ORDER — NIFEDIPINE 30 MG/1
30 TABLET, EXTENDED RELEASE ORAL DAILY
Qty: 90 TABLET | Refills: 0 | Status: SHIPPED | OUTPATIENT
Start: 2025-05-01

## 2025-05-15 RX ORDER — METOPROLOL SUCCINATE 25 MG/1
25 TABLET, EXTENDED RELEASE ORAL DAILY
Qty: 90 TABLET | Refills: 0 | Status: SHIPPED | OUTPATIENT
Start: 2025-05-15

## 2025-06-04 DIAGNOSIS — R45.86 MOOD CHANGES: Primary | ICD-10-CM

## 2025-06-05 ENCOUNTER — HOSPITAL ENCOUNTER (OUTPATIENT)
Age: 60
Discharge: HOME | End: 2025-06-05
Payer: COMMERCIAL

## 2025-06-05 DIAGNOSIS — R45.86 MOOD CHANGES: ICD-10-CM

## 2025-06-05 DIAGNOSIS — E11.9 DIABETES MELLITUS, NEW ONSET (HCC): ICD-10-CM

## 2025-06-05 DIAGNOSIS — E78.1 HYPERTRIGLYCERIDEMIA: ICD-10-CM

## 2025-06-05 DIAGNOSIS — E55.9 VITAMIN D DEFICIENCY: ICD-10-CM

## 2025-06-05 LAB
25(OH)D3 SERPL-MCNC: 32 NG/ML (ref 30–100)
ALBUMIN SERPL-MCNC: 4 G/DL (ref 3.4–5)
ALBUMIN/GLOB SERPL: 1 (ref 0.8–1.7)
ALP SERPL-CCNC: 80 U/L (ref 45–117)
ALT SERPL-CCNC: 26 U/L (ref 10–35)
ANION GAP SERPL CALC-SCNC: 15 MMOL/L (ref 3–18)
AST SERPL-CCNC: 26 U/L (ref 10–38)
BILIRUB SERPL-MCNC: 0.2 MG/DL (ref 0.2–1)
BUN SERPL-MCNC: 25 MG/DL (ref 6–23)
BUN/CREAT SERPL: 27 (ref 12–20)
CALCIUM SERPL-MCNC: 10.7 MG/DL (ref 8.5–10.1)
CHLORIDE SERPL-SCNC: 102 MMOL/L (ref 98–107)
CHOLEST SERPL-MCNC: 165 MG/DL
CO2 SERPL-SCNC: 25 MMOL/L (ref 21–32)
CREAT SERPL-MCNC: 0.92 MG/DL (ref 0.6–1.3)
EST. AVERAGE GLUCOSE BLD GHB EST-MCNC: 153 MG/DL
FSH SERPL-ACNC: 35.5 MIU/ML
GLOBULIN SER CALC-MCNC: 4 G/DL (ref 2–4)
GLUCOSE SERPL-MCNC: 138 MG/DL (ref 74–108)
HBA1C MFR BLD: 7 % (ref 4.2–5.6)
HDLC SERPL-MCNC: 40 MG/DL (ref 40–60)
HDLC SERPL: 4.1 (ref 0–5)
LDLC SERPL CALC-MCNC: 89 MG/DL (ref 0–100)
LH SERPL-ACNC: 18.9 MIU/ML
POTASSIUM SERPL-SCNC: 4.7 MMOL/L (ref 3.5–5.5)
PROT SERPL-MCNC: 8 G/DL (ref 6.4–8.2)
SODIUM SERPL-SCNC: 142 MMOL/L (ref 136–145)
TRIGL SERPL-MCNC: 180 MG/DL (ref 0–150)
VLDLC SERPL CALC-MCNC: 36 MG/DL

## 2025-06-05 PROCEDURE — 83001 ASSAY OF GONADOTROPIN (FSH): CPT

## 2025-06-05 PROCEDURE — 83002 ASSAY OF GONADOTROPIN (LH): CPT

## 2025-06-05 PROCEDURE — 83036 HEMOGLOBIN GLYCOSYLATED A1C: CPT

## 2025-06-05 PROCEDURE — 36415 COLL VENOUS BLD VENIPUNCTURE: CPT

## 2025-06-05 PROCEDURE — 82679 ASSAY OF ESTRONE: CPT

## 2025-06-05 PROCEDURE — 80061 LIPID PANEL: CPT

## 2025-06-05 PROCEDURE — 82670 ASSAY OF TOTAL ESTRADIOL: CPT

## 2025-06-05 PROCEDURE — 80053 COMPREHEN METABOLIC PANEL: CPT

## 2025-06-05 PROCEDURE — 82306 VITAMIN D 25 HYDROXY: CPT

## 2025-06-06 LAB
ESTRADIOL SERPL-MCNC: 17.9 PG/ML
ESTRONE SERPL-MCNC: NORMAL PG/ML

## 2025-06-07 DIAGNOSIS — E11.9 DIABETES MELLITUS, NEW ONSET (HCC): ICD-10-CM

## 2025-06-09 ENCOUNTER — RESULTS FOLLOW-UP (OUTPATIENT)
Facility: CLINIC | Age: 60
End: 2025-06-09

## 2025-06-09 LAB
ESTRADIOL SERPL-MCNC: 17.9 PG/ML
ESTRONE SERPL-MCNC: 26 PG/ML

## 2025-06-10 ENCOUNTER — TELEPHONE (OUTPATIENT)
Facility: CLINIC | Age: 60
End: 2025-06-10

## 2025-06-10 ENCOUNTER — OFFICE VISIT (OUTPATIENT)
Facility: CLINIC | Age: 60
End: 2025-06-10
Payer: COMMERCIAL

## 2025-06-10 VITALS
OXYGEN SATURATION: 97 % | HEART RATE: 83 BPM | RESPIRATION RATE: 16 BRPM | SYSTOLIC BLOOD PRESSURE: 112 MMHG | BODY MASS INDEX: 33.26 KG/M2 | DIASTOLIC BLOOD PRESSURE: 70 MMHG | WEIGHT: 199.6 LBS | HEIGHT: 65 IN | TEMPERATURE: 97.5 F

## 2025-06-10 DIAGNOSIS — Q24.5 ANOMALOUS CORONARY ARTERY ORIGIN: ICD-10-CM

## 2025-06-10 DIAGNOSIS — Z78.0 POSTMENOPAUSAL: ICD-10-CM

## 2025-06-10 DIAGNOSIS — K58.9 IRRITABLE BOWEL SYNDROME, UNSPECIFIED TYPE: ICD-10-CM

## 2025-06-10 DIAGNOSIS — K21.9 GASTROESOPHAGEAL REFLUX DISEASE WITHOUT ESOPHAGITIS: ICD-10-CM

## 2025-06-10 DIAGNOSIS — M89.8X1 PAIN OF RIGHT SCAPULA: ICD-10-CM

## 2025-06-10 DIAGNOSIS — E11.9 DIABETES MELLITUS, NEW ONSET (HCC): ICD-10-CM

## 2025-06-10 DIAGNOSIS — F41.9 ANXIETY: ICD-10-CM

## 2025-06-10 DIAGNOSIS — R93.1 ABNORMAL COMPUTED TOMOGRAPHY ANGIOGRAPHY OF HEART: ICD-10-CM

## 2025-06-10 DIAGNOSIS — I10 ESSENTIAL HYPERTENSION: Primary | ICD-10-CM

## 2025-06-10 DIAGNOSIS — E78.1 HYPERTRIGLYCERIDEMIA: ICD-10-CM

## 2025-06-10 DIAGNOSIS — E83.52 SERUM CALCIUM ELEVATED: ICD-10-CM

## 2025-06-10 PROCEDURE — 3074F SYST BP LT 130 MM HG: CPT | Performed by: FAMILY MEDICINE

## 2025-06-10 PROCEDURE — 3051F HG A1C>EQUAL 7.0%<8.0%: CPT | Performed by: FAMILY MEDICINE

## 2025-06-10 PROCEDURE — 3078F DIAST BP <80 MM HG: CPT | Performed by: FAMILY MEDICINE

## 2025-06-10 PROCEDURE — 99214 OFFICE O/P EST MOD 30 MIN: CPT | Performed by: FAMILY MEDICINE

## 2025-06-10 RX ORDER — ESTRADIOL 1 MG/1
1 TABLET ORAL DAILY
COMMUNITY
End: 2025-06-10

## 2025-06-10 RX ORDER — GARLIC 180 MG
TABLET, DELAYED RELEASE (ENTERIC COATED) ORAL
COMMUNITY

## 2025-06-10 NOTE — PROGRESS NOTES
Adwoa Plascencia (:  1965) is a 60 y.o. female, Established patient, here for evaluation of the following chief complaint(s):  Cholesterol Problem, Diabetes, Anxiety, Shoulder Pain (Right shoulder pain off/on - no pain at this time), and Headache (No headache pain at this time)         Assessment & Plan  1. Hyperlipidemia.  - Triglyceride levels have decreased from 310 to 180, indicating an improvement in dietary fat intake.  - LDL cholesterol remains at 89, which is above the desired level of <70.  - Current regimen of Tricor and Lipitor will be continued.  - Low-fat diet has been recommended.    2. Diabetes mellitus.  - Despite weight loss, A1c has increased from 6.7 to 7.0.  - Currently on Farxiga.  - Advised to avoid high-sugar fruits such as grapes, bananas, and watermelon, and consume more balanced fruits like apples, pears, peaches, strawberries, blueberries, and kiwi. Quantity of fruits and nuts should be limited.  - Information on diabetic diet provided in after-visit summary. Weekly injections for diabetes management may be considered if necessary.    3. Elevated BUN.  - GFR is within normal limits, but BUN is slightly elevated, possibly indicating dehydration.  - Advised to increase water intake.  - Will be monitored closely.    4. Elevated calcium levels.  - Calcium levels are slightly elevated. Does not take any calcium supplements or vitamin D.  - Parathyroid test will be added to blood work to investigate further.  - Advised to start taking 1000 units of vitamin D daily over the counter.    5. Menopause.  - Postmenopausal status confirmed.  - Hormone replacement therapy not recommended due to associated risks of uterine and breast cancer, and increased risk for heart problems.  - Can increase intake of black cohosh if needed.    6. Anxiety.  - Anxiety levels remain unchanged.  - Advised to continue managing anxiety through healthy lifestyle changes and stress management techniques.    7.

## 2025-06-10 NOTE — PROGRESS NOTES
Have you been to the ER, urgent care clinic since your last visit?  Hospitalized since your last visit?   NO    Have you seen or consulted any other health care providers outside our system since your last visit?   OB/GYN, Dr. Miranda De La Torre LOV: 6/02/25.     “Have you had a pap smear?”    NO - hysterectomy    No cervical cancer screening on file       “Have you had a diabetic eye exam?”    Last dm eye exam was on 2/04/25 - Dr. Be. Record has been requested     No diabetic eye exam on file     Chief Complaint   Patient presents with    Cholesterol Problem    Diabetes    Anxiety    Shoulder Pain     Right shoulder pain off/on - no pain at this time    Headache     No headache pain at this time

## 2025-06-10 NOTE — TELEPHONE ENCOUNTER
Left a voice message advising she will need to go to the lab to complete lab test for Dr. Kirby. Unable to add-on lab test to current specimen. A detailed [employer] message has been sent to patient.

## 2025-06-11 RX ORDER — DAPAGLIFLOZIN 10 MG/1
10 TABLET, FILM COATED ORAL EVERY MORNING
Qty: 90 TABLET | Refills: 1 | Status: SHIPPED | OUTPATIENT
Start: 2025-06-11

## 2025-06-13 ENCOUNTER — TELEPHONE (OUTPATIENT)
Facility: CLINIC | Age: 60
End: 2025-06-13

## 2025-06-13 NOTE — TELEPHONE ENCOUNTER
This patient contacted office for the following prescriptions to be filled:    Medication requested : Farxiga 10MG   PCP:    Pharmacy or Print:  [facility]   Mail order or Local pharmacy [facility]     Scheduled appointment if not seen by current providers in office:  Lov  06/10/2025, F/u 10/13/2025

## 2025-06-16 ENCOUNTER — TELEPHONE (OUTPATIENT)
Age: 60
End: 2025-06-16

## 2025-06-16 DIAGNOSIS — I25.84 CORONARY ATHEROSCLEROSIS DUE TO CALCIFIED CORONARY LESION (CODE): Primary | ICD-10-CM

## 2025-06-16 NOTE — TELEPHONE ENCOUNTER
Called and left a message with patient regarding results per RUMA Craft. Lab reviewed. CTA was abnormal, will refer to see a cardiac surgeon.

## 2025-06-30 DIAGNOSIS — E78.1 HYPERTRIGLYCERIDEMIA: ICD-10-CM

## 2025-06-30 DIAGNOSIS — R45.86 MOOD CHANGES: ICD-10-CM

## 2025-06-30 DIAGNOSIS — R23.2 HOT FLASHES: ICD-10-CM

## 2025-06-30 RX ORDER — VENLAFAXINE HYDROCHLORIDE 37.5 MG/1
37.5 CAPSULE, EXTENDED RELEASE ORAL DAILY
Qty: 30 CAPSULE | Refills: 3 | Status: CANCELLED | OUTPATIENT
Start: 2025-06-30

## 2025-06-30 RX ORDER — FENOFIBRATE 145 MG/1
145 TABLET, FILM COATED ORAL DAILY
Qty: 30 TABLET | Refills: 3 | Status: CANCELLED | OUTPATIENT
Start: 2025-06-30

## 2025-06-30 RX ORDER — INDOMETHACIN 50 MG/1
CAPSULE ORAL
Qty: 60 CAPSULE | Refills: 0 | Status: SHIPPED | OUTPATIENT
Start: 2025-06-30

## 2025-06-30 RX ORDER — VENLAFAXINE HYDROCHLORIDE 37.5 MG/1
37.5 CAPSULE, EXTENDED RELEASE ORAL DAILY
Qty: 30 CAPSULE | Refills: 1 | Status: SHIPPED | OUTPATIENT
Start: 2025-06-30

## 2025-06-30 RX ORDER — FENOFIBRATE 145 MG/1
145 TABLET, FILM COATED ORAL DAILY
Qty: 90 TABLET | Refills: 0 | Status: SHIPPED | OUTPATIENT
Start: 2025-06-30

## 2025-07-22 RX ORDER — DICYCLOMINE HCL 20 MG
20 TABLET ORAL EVERY 6 HOURS
Qty: 90 TABLET | Refills: 1 | Status: SHIPPED | OUTPATIENT
Start: 2025-07-22

## 2025-07-28 DIAGNOSIS — E11.9 DIABETES MELLITUS, NEW ONSET (HCC): ICD-10-CM

## 2025-07-28 RX ORDER — SERTRALINE HYDROCHLORIDE 25 MG/1
25 TABLET, FILM COATED ORAL DAILY
Qty: 90 TABLET | Refills: 0 | Status: SHIPPED | OUTPATIENT
Start: 2025-07-28

## 2025-07-28 RX ORDER — NIFEDIPINE 30 MG/1
30 TABLET, EXTENDED RELEASE ORAL DAILY
Qty: 90 TABLET | Refills: 0 | Status: SHIPPED | OUTPATIENT
Start: 2025-07-28

## 2025-07-28 RX ORDER — OMEPRAZOLE 20 MG/1
20 CAPSULE, DELAYED RELEASE ORAL DAILY
Qty: 90 CAPSULE | Refills: 0 | Status: SHIPPED | OUTPATIENT
Start: 2025-07-28

## 2025-07-28 RX ORDER — DAPAGLIFLOZIN 10 MG/1
10 TABLET, FILM COATED ORAL EVERY MORNING
Qty: 90 TABLET | Refills: 0 | Status: SHIPPED | OUTPATIENT
Start: 2025-07-28

## 2025-07-28 NOTE — TELEPHONE ENCOUNTER
This patient contacted office for the following prescriptions to be filled:    Medication requested :   sertraline (ZOLOFT) 25 MG tablet  QTY 90     omeprazole (PRILOSEC) 20 MG delayed release capsule Qty 90    NIFEdipine (PROCARDIA XL) 30 MG extended release tablet QTY 90    dapagliflozin (FARXIGA) 10 MG tablet Brand name only QTY 90 Refill 1  PCP: Kofi  Pharmacy or Print: [facility]  Mail order or Local pharmacy [id number] [facility]   Last office visit 6/10/2025   Next office visit 10/13/2025. Pt leaving out of town on Friday will need by Thursday please.   Changing pharmacy. [facility] Closed.

## 2025-07-29 RX ORDER — INDOMETHACIN 50 MG/1
CAPSULE ORAL
Qty: 60 CAPSULE | Refills: 0 | Status: SHIPPED | OUTPATIENT
Start: 2025-07-29

## 2025-08-13 ENCOUNTER — HOSPITAL ENCOUNTER (OUTPATIENT)
Age: 60
Discharge: HOME | End: 2025-08-16
Payer: COMMERCIAL

## 2025-08-13 ENCOUNTER — HOSPITAL ENCOUNTER (OUTPATIENT)
Age: 60
Discharge: HOME | End: 2025-08-13
Payer: COMMERCIAL

## 2025-08-13 ENCOUNTER — OFFICE VISIT (OUTPATIENT)
Facility: CLINIC | Age: 60
End: 2025-08-13
Payer: COMMERCIAL

## 2025-08-13 VITALS
HEART RATE: 81 BPM | WEIGHT: 192.4 LBS | BODY MASS INDEX: 32.06 KG/M2 | DIASTOLIC BLOOD PRESSURE: 78 MMHG | RESPIRATION RATE: 16 BRPM | OXYGEN SATURATION: 97 % | HEIGHT: 65 IN | TEMPERATURE: 97.1 F | SYSTOLIC BLOOD PRESSURE: 104 MMHG

## 2025-08-13 DIAGNOSIS — M25.512 CHRONIC LEFT SHOULDER PAIN: Primary | ICD-10-CM

## 2025-08-13 DIAGNOSIS — G89.29 CHRONIC LEFT SHOULDER PAIN: Primary | ICD-10-CM

## 2025-08-13 DIAGNOSIS — M89.8X1 CHRONIC SCAPULAR PAIN: ICD-10-CM

## 2025-08-13 DIAGNOSIS — G89.29 CHRONIC LEFT SHOULDER PAIN: ICD-10-CM

## 2025-08-13 DIAGNOSIS — E83.52 SERUM CALCIUM ELEVATED: ICD-10-CM

## 2025-08-13 DIAGNOSIS — G89.29 CHRONIC SCAPULAR PAIN: ICD-10-CM

## 2025-08-13 DIAGNOSIS — M25.512 CHRONIC LEFT SHOULDER PAIN: ICD-10-CM

## 2025-08-13 DIAGNOSIS — R93.89 ABNORMAL COMPUTED TOMOGRAPHY ANGIOGRAPHY (CTA): ICD-10-CM

## 2025-08-13 LAB
CALCIUM SERPL-MCNC: 10.4 MG/DL (ref 8.5–10.1)
PTH-INTACT SERPL-MCNC: 37.2 PG/ML (ref 15–65)

## 2025-08-13 PROCEDURE — 3078F DIAST BP <80 MM HG: CPT | Performed by: FAMILY MEDICINE

## 2025-08-13 PROCEDURE — 73010 X-RAY EXAM OF SHOULDER BLADE: CPT

## 2025-08-13 PROCEDURE — 99213 OFFICE O/P EST LOW 20 MIN: CPT | Performed by: FAMILY MEDICINE

## 2025-08-13 PROCEDURE — 83970 ASSAY OF PARATHORMONE: CPT

## 2025-08-13 PROCEDURE — 36415 COLL VENOUS BLD VENIPUNCTURE: CPT

## 2025-08-13 PROCEDURE — 3074F SYST BP LT 130 MM HG: CPT | Performed by: FAMILY MEDICINE

## 2025-08-21 RX ORDER — METOPROLOL SUCCINATE 25 MG/1
25 TABLET, EXTENDED RELEASE ORAL DAILY
Qty: 90 TABLET | Refills: 1 | Status: SHIPPED | OUTPATIENT
Start: 2025-08-21

## 2025-08-28 ENCOUNTER — CARE COORDINATION (OUTPATIENT)
Facility: CLINIC | Age: 60
End: 2025-08-28

## 2025-08-28 DIAGNOSIS — G95.9 SPINAL CORD LESION (HCC): Primary | ICD-10-CM

## 2025-08-28 DIAGNOSIS — M89.8X9 LYTIC LESION OF BONE ON X-RAY: ICD-10-CM

## 2025-08-28 DIAGNOSIS — F41.9 ANXIETY: Primary | ICD-10-CM

## 2025-08-28 DIAGNOSIS — R11.2 NAUSEA AND VOMITING, UNSPECIFIED VOMITING TYPE: ICD-10-CM

## 2025-08-28 RX ORDER — DEXAMETHASONE 4 MG/1
4 TABLET ORAL SEE ADMIN INSTRUCTIONS
COMMUNITY
Start: 2025-08-27

## 2025-08-28 RX ORDER — POLYETHYLENE GLYCOL 3350 17 G/17G
17 POWDER, FOR SOLUTION ORAL DAILY PRN
COMMUNITY
Start: 2025-08-27

## 2025-08-28 RX ORDER — ONDANSETRON 4 MG/1
4 TABLET, FILM COATED ORAL DAILY PRN
Qty: 30 TABLET | Refills: 0 | Status: SHIPPED | OUTPATIENT
Start: 2025-08-28

## 2025-08-28 RX ORDER — HYDROXYZINE HYDROCHLORIDE 25 MG/1
25 TABLET, FILM COATED ORAL NIGHTLY PRN
COMMUNITY
Start: 2025-08-27

## 2025-08-28 RX ORDER — SENNOSIDES 8.6 MG/1
8.6 TABLET ORAL 2 TIMES DAILY PRN
COMMUNITY
Start: 2025-08-27

## 2025-08-28 RX ORDER — ALPRAZOLAM 1 MG/1
1 TABLET ORAL NIGHTLY PRN
Qty: 30 TABLET | Refills: 0 | Status: SHIPPED | OUTPATIENT
Start: 2025-08-28 | End: 2025-09-27

## 2025-08-28 RX ORDER — OXYCODONE HYDROCHLORIDE 5 MG/1
5 TABLET ORAL EVERY 6 HOURS PRN
COMMUNITY
Start: 2025-08-27

## 2025-08-29 ENCOUNTER — TELEPHONE (OUTPATIENT)
Facility: CLINIC | Age: 60
End: 2025-08-29

## 2025-09-02 ENCOUNTER — OFFICE VISIT (OUTPATIENT)
Facility: CLINIC | Age: 60
End: 2025-09-02
Payer: COMMERCIAL

## 2025-09-02 VITALS
HEART RATE: 90 BPM | OXYGEN SATURATION: 98 % | DIASTOLIC BLOOD PRESSURE: 70 MMHG | WEIGHT: 189 LBS | RESPIRATION RATE: 16 BRPM | HEIGHT: 65 IN | TEMPERATURE: 97.1 F | BODY MASS INDEX: 31.49 KG/M2 | SYSTOLIC BLOOD PRESSURE: 120 MMHG

## 2025-09-02 DIAGNOSIS — C90.00 MULTIPLE MYELOMA, REMISSION STATUS UNSPECIFIED (HCC): ICD-10-CM

## 2025-09-02 DIAGNOSIS — I10 ESSENTIAL HYPERTENSION: ICD-10-CM

## 2025-09-02 DIAGNOSIS — Z09 HOSPITAL DISCHARGE FOLLOW-UP: Primary | ICD-10-CM

## 2025-09-02 PROCEDURE — 3074F SYST BP LT 130 MM HG: CPT | Performed by: FAMILY MEDICINE

## 2025-09-02 PROCEDURE — 3078F DIAST BP <80 MM HG: CPT | Performed by: FAMILY MEDICINE

## 2025-09-02 PROCEDURE — 99213 OFFICE O/P EST LOW 20 MIN: CPT | Performed by: FAMILY MEDICINE

## 2025-09-02 PROCEDURE — 1111F DSCHRG MED/CURRENT MED MERGE: CPT | Performed by: FAMILY MEDICINE

## 2025-09-03 DIAGNOSIS — R45.86 MOOD CHANGES: ICD-10-CM

## 2025-09-03 DIAGNOSIS — R23.2 HOT FLASHES: ICD-10-CM

## 2025-09-04 ENCOUNTER — CARE COORDINATION (OUTPATIENT)
Facility: CLINIC | Age: 60
End: 2025-09-04

## 2025-09-04 RX ORDER — VENLAFAXINE HYDROCHLORIDE 37.5 MG/1
CAPSULE, EXTENDED RELEASE ORAL DAILY
Qty: 90 CAPSULE | Refills: 1 | Status: SHIPPED | OUTPATIENT
Start: 2025-09-04

## 2025-09-05 ENCOUNTER — CARE COORDINATION (OUTPATIENT)
Facility: CLINIC | Age: 60
End: 2025-09-05

## (undated) DEVICE — COVER US PRB W15XL120CM W/ GEL RUBBERBAND TAPE STRP FLD GEN

## (undated) DEVICE — BAND HEMOSTAT DRY D-STAT RAD --

## (undated) DEVICE — RADIFOCUS OPTITORQUE ANGIOGRAPHIC CATHETER: Brand: OPTITORQUE

## (undated) DEVICE — CATHETER ANGIO 4FR L100CM S STL NYL JL4 3 SEG BRAID SFT

## (undated) DEVICE — SET FLD ADMIN 3 W STPCOCK FIX FEM L BOR 1IN

## (undated) DEVICE — INTRODUCER SHTH 4FR CANN L11CM DIL TIP 25MM RED TUNGSTEN

## (undated) DEVICE — CATHETER DIAG AD 4FR L100CM STD NYL JUDKINS R 4 TRULUMEN

## (undated) DEVICE — ANGIOGRAPHY KIT CUST VASC

## (undated) DEVICE — PROCEDURE KIT FLUID MGMT 10 FR CUST MAINFOLD

## (undated) DEVICE — PACK PROCEDURE SURG VASC CATH 161 MMC LF

## (undated) DEVICE — CATHETER ANGIO 4FR L100CM COR NYL AR MOD W/O SIDE H RADPQ

## (undated) DEVICE — GLIDESHEATH SLENDER STAINLESS STEEL KIT: Brand: GLIDESHEATH SLENDER

## (undated) DEVICE — CATHETER ANGIO 4FR L100CM S STL NYL AL1 3 SEG BRAID SFT

## (undated) DEVICE — PRESSURE MONITORING SET: Brand: TRUWAVE